# Patient Record
Sex: MALE | URBAN - METROPOLITAN AREA
[De-identification: names, ages, dates, MRNs, and addresses within clinical notes are randomized per-mention and may not be internally consistent; named-entity substitution may affect disease eponyms.]

---

## 2022-09-04 ENCOUNTER — INPATIENT (INPATIENT)
Facility: HOSPITAL | Age: 52
LOS: 1 days | Discharge: HOME | DRG: 432 | End: 2022-09-06
Payer: MEDICARE

## 2022-09-04 VITALS
TEMPERATURE: 98 F | HEIGHT: 63 IN | RESPIRATION RATE: 18 BRPM | HEART RATE: 86 BPM | SYSTOLIC BLOOD PRESSURE: 102 MMHG | OXYGEN SATURATION: 100 % | DIASTOLIC BLOOD PRESSURE: 61 MMHG | WEIGHT: 190.04 LBS

## 2022-09-04 DIAGNOSIS — F17.200 NICOTINE DEPENDENCE, UNSPECIFIED, UNCOMPLICATED: ICD-10-CM

## 2022-09-04 DIAGNOSIS — I95.9 HYPOTENSION, UNSPECIFIED: ICD-10-CM

## 2022-09-04 DIAGNOSIS — R16.1 SPLENOMEGALY, NOT ELSEWHERE CLASSIFIED: ICD-10-CM

## 2022-09-04 DIAGNOSIS — K71.3 TOXIC LIVER DISEASE WITH CHRONIC PERSISTENT HEPATITIS: ICD-10-CM

## 2022-09-04 DIAGNOSIS — Z59.9 PROBLEM RELATED TO HOUSING AND ECONOMIC CIRCUMSTANCES, UNSPECIFIED: ICD-10-CM

## 2022-09-04 DIAGNOSIS — M54.9 DORSALGIA, UNSPECIFIED: ICD-10-CM

## 2022-09-04 DIAGNOSIS — I10 ESSENTIAL (PRIMARY) HYPERTENSION: ICD-10-CM

## 2022-09-04 DIAGNOSIS — E87.5 HYPERKALEMIA: ICD-10-CM

## 2022-09-04 DIAGNOSIS — D64.9 ANEMIA, UNSPECIFIED: ICD-10-CM

## 2022-09-04 DIAGNOSIS — F10.10 ALCOHOL ABUSE, UNCOMPLICATED: ICD-10-CM

## 2022-09-04 DIAGNOSIS — N17.9 ACUTE KIDNEY FAILURE, UNSPECIFIED: ICD-10-CM

## 2022-09-04 DIAGNOSIS — E11.9 TYPE 2 DIABETES MELLITUS WITHOUT COMPLICATIONS: ICD-10-CM

## 2022-09-04 DIAGNOSIS — G89.29 OTHER CHRONIC PAIN: ICD-10-CM

## 2022-09-04 DIAGNOSIS — K27.9 PEPTIC ULCER, SITE UNSPECIFIED, UNSPECIFIED AS ACUTE OR CHRONIC, WITHOUT HEMORRHAGE OR PERFORATION: ICD-10-CM

## 2022-09-04 DIAGNOSIS — Z59.00 HOMELESSNESS UNSPECIFIED: ICD-10-CM

## 2022-09-04 DIAGNOSIS — M54.50 LOW BACK PAIN, UNSPECIFIED: ICD-10-CM

## 2022-09-04 DIAGNOSIS — K70.31 ALCOHOLIC CIRRHOSIS OF LIVER WITH ASCITES: ICD-10-CM

## 2022-09-04 DIAGNOSIS — Z98.84 BARIATRIC SURGERY STATUS: ICD-10-CM

## 2022-09-04 DIAGNOSIS — J45.909 UNSPECIFIED ASTHMA, UNCOMPLICATED: ICD-10-CM

## 2022-09-04 DIAGNOSIS — F17.210 NICOTINE DEPENDENCE, CIGARETTES, UNCOMPLICATED: ICD-10-CM

## 2022-09-04 DIAGNOSIS — Z29.9 ENCOUNTER FOR PROPHYLACTIC MEASURES, UNSPECIFIED: ICD-10-CM

## 2022-09-04 DIAGNOSIS — G47.33 OBSTRUCTIVE SLEEP APNEA (ADULT) (PEDIATRIC): ICD-10-CM

## 2022-09-04 DIAGNOSIS — F10.11 ALCOHOL ABUSE, IN REMISSION: ICD-10-CM

## 2022-09-04 DIAGNOSIS — Z98.84 BARIATRIC SURGERY STATUS: Chronic | ICD-10-CM

## 2022-09-04 DIAGNOSIS — K74.60 UNSPECIFIED CIRRHOSIS OF LIVER: ICD-10-CM

## 2022-09-04 DIAGNOSIS — F14.99 COCAINE USE, UNSPECIFIED WITH UNSPECIFIED COCAINE-INDUCED DISORDER: ICD-10-CM

## 2022-09-04 DIAGNOSIS — N17.0 ACUTE KIDNEY FAILURE WITH TUBULAR NECROSIS: ICD-10-CM

## 2022-09-04 DIAGNOSIS — F12.99 CANNABIS USE, UNSPECIFIED WITH UNSPECIFIED CANNABIS-INDUCED DISORDER: ICD-10-CM

## 2022-09-04 LAB
ALBUMIN SERPL ELPH-MCNC: 3.1 G/DL — LOW (ref 3.4–5)
ALBUMIN SERPL ELPH-MCNC: 3.5 G/DL — SIGNIFICANT CHANGE UP (ref 3.3–5)
ALP SERPL-CCNC: 87 U/L — SIGNIFICANT CHANGE UP (ref 40–120)
ALP SERPL-CCNC: 88 U/L — SIGNIFICANT CHANGE UP (ref 40–120)
ALT FLD-CCNC: 10 U/L — SIGNIFICANT CHANGE UP (ref 10–45)
ALT FLD-CCNC: 15 U/L — SIGNIFICANT CHANGE UP (ref 12–42)
AMPHET UR-MCNC: NEGATIVE — SIGNIFICANT CHANGE UP
ANION GAP SERPL CALC-SCNC: 10 MMOL/L — SIGNIFICANT CHANGE UP (ref 9–16)
ANION GAP SERPL CALC-SCNC: 7 MMOL/L — SIGNIFICANT CHANGE UP (ref 5–17)
ANION GAP SERPL CALC-SCNC: 8 MMOL/L — SIGNIFICANT CHANGE UP (ref 5–17)
ANION GAP SERPL CALC-SCNC: 9 MMOL/L — SIGNIFICANT CHANGE UP (ref 9–16)
APPEARANCE UR: CLEAR — SIGNIFICANT CHANGE UP
APTT BLD: 35.3 SEC — SIGNIFICANT CHANGE UP (ref 27.5–35.5)
APTT BLD: 39.7 SEC — HIGH (ref 27.5–35.5)
AST SERPL-CCNC: 19 U/L — SIGNIFICANT CHANGE UP (ref 10–40)
AST SERPL-CCNC: 19 U/L — SIGNIFICANT CHANGE UP (ref 15–37)
BACTERIA # UR AUTO: ABNORMAL /HPF
BARBITURATES UR SCN-MCNC: NEGATIVE — SIGNIFICANT CHANGE UP
BASOPHILS # BLD AUTO: 0.01 K/UL — SIGNIFICANT CHANGE UP (ref 0–0.2)
BASOPHILS # BLD AUTO: 0.03 K/UL — SIGNIFICANT CHANGE UP (ref 0–0.2)
BASOPHILS NFR BLD AUTO: 0.2 % — SIGNIFICANT CHANGE UP (ref 0–2)
BASOPHILS NFR BLD AUTO: 0.5 % — SIGNIFICANT CHANGE UP (ref 0–2)
BENZODIAZ UR-MCNC: NEGATIVE — SIGNIFICANT CHANGE UP
BILIRUB SERPL-MCNC: 0.5 MG/DL — SIGNIFICANT CHANGE UP (ref 0.2–1.2)
BILIRUB UR-MCNC: NEGATIVE — SIGNIFICANT CHANGE UP
BLD GP AB SCN SERPL QL: NEGATIVE — SIGNIFICANT CHANGE UP
BUN SERPL-MCNC: 115 MG/DL — HIGH (ref 7–23)
BUN SERPL-MCNC: 119 MG/DL — HIGH (ref 7–23)
BUN SERPL-MCNC: 72 MG/DL — HIGH (ref 7–23)
BUN SERPL-MCNC: 86 MG/DL — HIGH (ref 7–23)
CALCIUM SERPL-MCNC: 8.9 MG/DL — SIGNIFICANT CHANGE UP (ref 8.5–10.5)
CALCIUM SERPL-MCNC: 9.2 MG/DL — SIGNIFICANT CHANGE UP (ref 8.5–10.5)
CALCIUM SERPL-MCNC: 9.4 MG/DL — SIGNIFICANT CHANGE UP (ref 8.4–10.5)
CALCIUM SERPL-MCNC: 9.5 MG/DL — SIGNIFICANT CHANGE UP (ref 8.4–10.5)
CHLORIDE SERPL-SCNC: 103 MMOL/L — SIGNIFICANT CHANGE UP (ref 96–108)
CHLORIDE SERPL-SCNC: 105 MMOL/L — SIGNIFICANT CHANGE UP (ref 96–108)
CHLORIDE SERPL-SCNC: 106 MMOL/L — SIGNIFICANT CHANGE UP (ref 96–108)
CK SERPL-CCNC: 35 U/L — LOW (ref 39–308)
CO2 SERPL-SCNC: 22 MMOL/L — SIGNIFICANT CHANGE UP (ref 22–31)
CO2 SERPL-SCNC: 23 MMOL/L — SIGNIFICANT CHANGE UP (ref 22–31)
CO2 SERPL-SCNC: 25 MMOL/L — SIGNIFICANT CHANGE UP (ref 22–31)
COCAINE METAB.OTHER UR-MCNC: POSITIVE
COLOR SPEC: YELLOW — SIGNIFICANT CHANGE UP
COMMENT - URINE: SIGNIFICANT CHANGE UP
CREAT SERPL-MCNC: 1.93 MG/DL — HIGH (ref 0.5–1.3)
CREAT SERPL-MCNC: 2.33 MG/DL — HIGH (ref 0.5–1.3)
CREAT SERPL-MCNC: 3.41 MG/DL — HIGH (ref 0.5–1.3)
CREAT SERPL-MCNC: 3.54 MG/DL — HIGH (ref 0.5–1.3)
DIFF PNL FLD: NEGATIVE — SIGNIFICANT CHANGE UP
EGFR: 20 ML/MIN/1.73M2 — LOW
EGFR: 21 ML/MIN/1.73M2 — LOW
EGFR: 33 ML/MIN/1.73M2 — LOW
EGFR: 41 ML/MIN/1.73M2 — LOW
EOSINOPHIL # BLD AUTO: 0.18 K/UL — SIGNIFICANT CHANGE UP (ref 0–0.5)
EOSINOPHIL # BLD AUTO: 0.25 K/UL — SIGNIFICANT CHANGE UP (ref 0–0.5)
EOSINOPHIL NFR BLD AUTO: 3.3 % — SIGNIFICANT CHANGE UP (ref 0–6)
EOSINOPHIL NFR BLD AUTO: 4.3 % — SIGNIFICANT CHANGE UP (ref 0–6)
EPI CELLS # UR: ABNORMAL /HPF (ref 0–5)
ETHANOL SERPL-MCNC: <3 MG/DL — SIGNIFICANT CHANGE UP
GLUCOSE BLDC GLUCOMTR-MCNC: 117 MG/DL — HIGH (ref 70–99)
GLUCOSE BLDC GLUCOMTR-MCNC: 178 MG/DL — HIGH (ref 70–99)
GLUCOSE BLDC GLUCOMTR-MCNC: 98 MG/DL — SIGNIFICANT CHANGE UP (ref 70–99)
GLUCOSE SERPL-MCNC: 143 MG/DL — HIGH (ref 70–99)
GLUCOSE SERPL-MCNC: 149 MG/DL — HIGH (ref 70–99)
GLUCOSE SERPL-MCNC: 173 MG/DL — HIGH (ref 70–99)
GLUCOSE SERPL-MCNC: 63 MG/DL — LOW (ref 70–99)
GLUCOSE UR QL: NEGATIVE — SIGNIFICANT CHANGE UP
HCT VFR BLD CALC: 27.3 % — LOW (ref 39–50)
HCT VFR BLD CALC: 29.5 % — LOW (ref 39–50)
HGB BLD-MCNC: 8.7 G/DL — LOW (ref 13–17)
HGB BLD-MCNC: 9.3 G/DL — LOW (ref 13–17)
HYALINE CASTS # UR AUTO: ABNORMAL /LPF (ref 0–2)
IMM GRANULOCYTES NFR BLD AUTO: 0.2 % — SIGNIFICANT CHANGE UP (ref 0–1.5)
IMM GRANULOCYTES NFR BLD AUTO: 0.3 % — SIGNIFICANT CHANGE UP (ref 0–1.5)
INR BLD: 1.19 — HIGH (ref 0.88–1.16)
INR BLD: 1.27 — HIGH (ref 0.88–1.16)
KETONES UR-MCNC: NEGATIVE — SIGNIFICANT CHANGE UP
LACTATE SERPL-SCNC: 1.5 MMOL/L — SIGNIFICANT CHANGE UP (ref 0.4–2)
LEUKOCYTE ESTERASE UR-ACNC: ABNORMAL
LIDOCAIN IGE QN: 235 U/L — SIGNIFICANT CHANGE UP (ref 73–393)
LYMPHOCYTES # BLD AUTO: 1.31 K/UL — SIGNIFICANT CHANGE UP (ref 1–3.3)
LYMPHOCYTES # BLD AUTO: 1.7 K/UL — SIGNIFICANT CHANGE UP (ref 1–3.3)
LYMPHOCYTES # BLD AUTO: 24.2 % — SIGNIFICANT CHANGE UP (ref 13–44)
LYMPHOCYTES # BLD AUTO: 29.4 % — SIGNIFICANT CHANGE UP (ref 13–44)
MAGNESIUM SERPL-MCNC: 2.3 MG/DL — SIGNIFICANT CHANGE UP (ref 1.6–2.6)
MCHC RBC-ENTMCNC: 27.9 PG — SIGNIFICANT CHANGE UP (ref 27–34)
MCHC RBC-ENTMCNC: 28 PG — SIGNIFICANT CHANGE UP (ref 27–34)
MCHC RBC-ENTMCNC: 31.5 GM/DL — LOW (ref 32–36)
MCHC RBC-ENTMCNC: 31.9 GM/DL — LOW (ref 32–36)
MCV RBC AUTO: 87.8 FL — SIGNIFICANT CHANGE UP (ref 80–100)
MCV RBC AUTO: 88.6 FL — SIGNIFICANT CHANGE UP (ref 80–100)
METHADONE UR-MCNC: NEGATIVE — SIGNIFICANT CHANGE UP
MONOCYTES # BLD AUTO: 0.44 K/UL — SIGNIFICANT CHANGE UP (ref 0–0.9)
MONOCYTES # BLD AUTO: 0.55 K/UL — SIGNIFICANT CHANGE UP (ref 0–0.9)
MONOCYTES NFR BLD AUTO: 8.1 % — SIGNIFICANT CHANGE UP (ref 2–14)
MONOCYTES NFR BLD AUTO: 9.5 % — SIGNIFICANT CHANGE UP (ref 2–14)
NEUTROPHILS # BLD AUTO: 3.23 K/UL — SIGNIFICANT CHANGE UP (ref 1.8–7.4)
NEUTROPHILS # BLD AUTO: 3.46 K/UL — SIGNIFICANT CHANGE UP (ref 1.8–7.4)
NEUTROPHILS NFR BLD AUTO: 56 % — SIGNIFICANT CHANGE UP (ref 43–77)
NEUTROPHILS NFR BLD AUTO: 64 % — SIGNIFICANT CHANGE UP (ref 43–77)
NITRITE UR-MCNC: NEGATIVE — SIGNIFICANT CHANGE UP
NRBC # BLD: 0 /100 WBCS — SIGNIFICANT CHANGE UP (ref 0–0)
NT-PROBNP SERPL-SCNC: 122 PG/ML — SIGNIFICANT CHANGE UP
OPIATES UR-MCNC: NEGATIVE — SIGNIFICANT CHANGE UP
PCO2 BLDV: 43 MMHG — SIGNIFICANT CHANGE UP (ref 42–55)
PCP SPEC-MCNC: SIGNIFICANT CHANGE UP
PCP UR-MCNC: NEGATIVE — SIGNIFICANT CHANGE UP
PH BLDV: 7.35 — SIGNIFICANT CHANGE UP (ref 7.32–7.43)
PH UR: 6 — SIGNIFICANT CHANGE UP (ref 5–8)
PHOSPHATE SERPL-MCNC: 3.6 MG/DL — SIGNIFICANT CHANGE UP (ref 2.5–4.5)
PLATELET # BLD AUTO: 126 K/UL — LOW (ref 150–400)
PLATELET # BLD AUTO: 140 K/UL — LOW (ref 150–400)
PO2 BLDV: 43 MMHG — SIGNIFICANT CHANGE UP (ref 25–45)
POTASSIUM SERPL-MCNC: 5.1 MMOL/L — SIGNIFICANT CHANGE UP (ref 3.5–5.3)
POTASSIUM SERPL-MCNC: 6.1 MMOL/L — HIGH (ref 3.5–5.3)
POTASSIUM SERPL-MCNC: 6.2 MMOL/L — CRITICAL HIGH (ref 3.5–5.3)
POTASSIUM SERPL-MCNC: 6.3 MMOL/L — CRITICAL HIGH (ref 3.5–5.3)
POTASSIUM SERPL-SCNC: 5.1 MMOL/L — SIGNIFICANT CHANGE UP (ref 3.5–5.3)
POTASSIUM SERPL-SCNC: 6.1 MMOL/L — HIGH (ref 3.5–5.3)
POTASSIUM SERPL-SCNC: 6.2 MMOL/L — CRITICAL HIGH (ref 3.5–5.3)
POTASSIUM SERPL-SCNC: 6.3 MMOL/L — CRITICAL HIGH (ref 3.5–5.3)
PROT SERPL-MCNC: 7.3 G/DL — SIGNIFICANT CHANGE UP (ref 6–8.3)
PROT SERPL-MCNC: 8 G/DL — SIGNIFICANT CHANGE UP (ref 6.4–8.2)
PROT UR-MCNC: NEGATIVE MG/DL — SIGNIFICANT CHANGE UP
PROTHROM AB SERPL-ACNC: 13.9 SEC — HIGH (ref 10.5–13.4)
PROTHROM AB SERPL-ACNC: 15.1 SEC — HIGH (ref 10.5–13.4)
RBC # BLD: 3.11 M/UL — LOW (ref 4.2–5.8)
RBC # BLD: 3.33 M/UL — LOW (ref 4.2–5.8)
RBC # FLD: 15.5 % — HIGH (ref 10.3–14.5)
RBC # FLD: 15.7 % — HIGH (ref 10.3–14.5)
RBC CASTS # UR COMP ASSIST: < 5 /HPF — SIGNIFICANT CHANGE UP
RH IG SCN BLD-IMP: POSITIVE — SIGNIFICANT CHANGE UP
SAO2 % BLDV: 78 % — SIGNIFICANT CHANGE UP (ref 67–88)
SARS-COV-2 RNA SPEC QL NAA+PROBE: SIGNIFICANT CHANGE UP
SODIUM SERPL-SCNC: 134 MMOL/L — SIGNIFICANT CHANGE UP (ref 132–145)
SODIUM SERPL-SCNC: 137 MMOL/L — SIGNIFICANT CHANGE UP (ref 135–145)
SODIUM SERPL-SCNC: 138 MMOL/L — SIGNIFICANT CHANGE UP (ref 132–145)
SODIUM SERPL-SCNC: 139 MMOL/L — SIGNIFICANT CHANGE UP (ref 135–145)
SP GR SPEC: 1.01 — SIGNIFICANT CHANGE UP (ref 1–1.03)
THC UR QL: POSITIVE
TROPONIN I, HIGH SENSITIVITY RESULT: 13.4 NG/L — SIGNIFICANT CHANGE UP
UROBILINOGEN FLD QL: 0.2 E.U./DL — SIGNIFICANT CHANGE UP
WBC # BLD: 5.41 K/UL — SIGNIFICANT CHANGE UP (ref 3.8–10.5)
WBC # BLD: 5.78 K/UL — SIGNIFICANT CHANGE UP (ref 3.8–10.5)
WBC # FLD AUTO: 5.41 K/UL — SIGNIFICANT CHANGE UP (ref 3.8–10.5)
WBC # FLD AUTO: 5.78 K/UL — SIGNIFICANT CHANGE UP (ref 3.8–10.5)
WBC UR QL: ABNORMAL /HPF

## 2022-09-04 PROCEDURE — 99222 1ST HOSP IP/OBS MODERATE 55: CPT

## 2022-09-04 PROCEDURE — 93010 ELECTROCARDIOGRAM REPORT: CPT

## 2022-09-04 PROCEDURE — 71045 X-RAY EXAM CHEST 1 VIEW: CPT | Mod: 26

## 2022-09-04 PROCEDURE — 74176 CT ABD & PELVIS W/O CONTRAST: CPT | Mod: 26,MH

## 2022-09-04 PROCEDURE — 99285 EMERGENCY DEPT VISIT HI MDM: CPT | Mod: 25

## 2022-09-04 PROCEDURE — 99223 1ST HOSP IP/OBS HIGH 75: CPT

## 2022-09-04 RX ORDER — DEXTROSE 50 % IN WATER 50 %
25 SYRINGE (ML) INTRAVENOUS ONCE
Refills: 0 | Status: DISCONTINUED | OUTPATIENT
Start: 2022-09-04 | End: 2022-09-06

## 2022-09-04 RX ORDER — OXYCODONE HYDROCHLORIDE 5 MG/1
10 TABLET ORAL ONCE
Refills: 0 | Status: DISCONTINUED | OUTPATIENT
Start: 2022-09-04 | End: 2022-09-04

## 2022-09-04 RX ORDER — INSULIN LISPRO 100/ML
VIAL (ML) SUBCUTANEOUS
Refills: 0 | Status: DISCONTINUED | OUTPATIENT
Start: 2022-09-04 | End: 2022-09-06

## 2022-09-04 RX ORDER — SPIRONOLACTONE 25 MG/1
100 TABLET, FILM COATED ORAL DAILY
Refills: 0 | Status: DISCONTINUED | OUTPATIENT
Start: 2022-09-04 | End: 2022-09-04

## 2022-09-04 RX ORDER — IPRATROPIUM/ALBUTEROL SULFATE 18-103MCG
3 AEROSOL WITH ADAPTER (GRAM) INHALATION EVERY 6 HOURS
Refills: 0 | Status: DISCONTINUED | OUTPATIENT
Start: 2022-09-04 | End: 2022-09-06

## 2022-09-04 RX ORDER — SODIUM CHLORIDE 9 MG/ML
1000 INJECTION, SOLUTION INTRAVENOUS
Refills: 0 | Status: DISCONTINUED | OUTPATIENT
Start: 2022-09-04 | End: 2022-09-06

## 2022-09-04 RX ORDER — GABAPENTIN 400 MG/1
300 CAPSULE ORAL THREE TIMES A DAY
Refills: 0 | Status: DISCONTINUED | OUTPATIENT
Start: 2022-09-04 | End: 2022-09-06

## 2022-09-04 RX ORDER — NICOTINE POLACRILEX 2 MG
1 GUM BUCCAL DAILY
Refills: 0 | Status: DISCONTINUED | OUTPATIENT
Start: 2022-09-04 | End: 2022-09-06

## 2022-09-04 RX ORDER — ALBUMIN HUMAN 25 %
100 VIAL (ML) INTRAVENOUS ONCE
Refills: 0 | Status: COMPLETED | OUTPATIENT
Start: 2022-09-05 | End: 2022-09-05

## 2022-09-04 RX ORDER — ALBUMIN HUMAN 25 %
50 VIAL (ML) INTRAVENOUS ONCE
Refills: 0 | Status: COMPLETED | OUTPATIENT
Start: 2022-09-06 | End: 2022-09-06

## 2022-09-04 RX ORDER — GLUCAGON INJECTION, SOLUTION 0.5 MG/.1ML
1 INJECTION, SOLUTION SUBCUTANEOUS ONCE
Refills: 0 | Status: DISCONTINUED | OUTPATIENT
Start: 2022-09-04 | End: 2022-09-06

## 2022-09-04 RX ORDER — SODIUM BICARBONATE 1 MEQ/ML
50 SYRINGE (ML) INTRAVENOUS ONCE
Refills: 0 | Status: COMPLETED | OUTPATIENT
Start: 2022-09-04 | End: 2022-09-04

## 2022-09-04 RX ORDER — SUCRALFATE 1 G
1 TABLET ORAL
Refills: 0 | Status: DISCONTINUED | OUTPATIENT
Start: 2022-09-04 | End: 2022-09-06

## 2022-09-04 RX ORDER — INFLUENZA VIRUS VACCINE 15; 15; 15; 15 UG/.5ML; UG/.5ML; UG/.5ML; UG/.5ML
0.5 SUSPENSION INTRAMUSCULAR ONCE
Refills: 0 | Status: DISCONTINUED | OUTPATIENT
Start: 2022-09-04 | End: 2022-09-06

## 2022-09-04 RX ORDER — DEXTROSE 50 % IN WATER 50 %
15 SYRINGE (ML) INTRAVENOUS ONCE
Refills: 0 | Status: DISCONTINUED | OUTPATIENT
Start: 2022-09-04 | End: 2022-09-06

## 2022-09-04 RX ORDER — CEFTRIAXONE 500 MG/1
2000 INJECTION, POWDER, FOR SOLUTION INTRAMUSCULAR; INTRAVENOUS EVERY 24 HOURS
Refills: 0 | Status: DISCONTINUED | OUTPATIENT
Start: 2022-09-04 | End: 2022-09-04

## 2022-09-04 RX ORDER — SODIUM ZIRCONIUM CYCLOSILICATE 10 G/10G
10 POWDER, FOR SUSPENSION ORAL ONCE
Refills: 0 | Status: COMPLETED | OUTPATIENT
Start: 2022-09-04 | End: 2022-09-04

## 2022-09-04 RX ORDER — FUROSEMIDE 40 MG
40 TABLET ORAL EVERY 24 HOURS
Refills: 0 | Status: DISCONTINUED | OUTPATIENT
Start: 2022-09-04 | End: 2022-09-04

## 2022-09-04 RX ORDER — SODIUM CHLORIDE 9 MG/ML
500 INJECTION INTRAMUSCULAR; INTRAVENOUS; SUBCUTANEOUS ONCE
Refills: 0 | Status: COMPLETED | OUTPATIENT
Start: 2022-09-04 | End: 2022-09-04

## 2022-09-04 RX ORDER — DEXTROSE 50 % IN WATER 50 %
12.5 SYRINGE (ML) INTRAVENOUS ONCE
Refills: 0 | Status: DISCONTINUED | OUTPATIENT
Start: 2022-09-04 | End: 2022-09-06

## 2022-09-04 RX ORDER — SPIRONOLACTONE 25 MG/1
100 TABLET, FILM COATED ORAL EVERY 24 HOURS
Refills: 0 | Status: DISCONTINUED | OUTPATIENT
Start: 2022-09-04 | End: 2022-09-04

## 2022-09-04 RX ORDER — ALBUMIN HUMAN 25 %
50 VIAL (ML) INTRAVENOUS ONCE
Refills: 0 | Status: COMPLETED | OUTPATIENT
Start: 2022-09-04 | End: 2022-09-04

## 2022-09-04 RX ORDER — DEXTROSE 50 % IN WATER 50 %
50 SYRINGE (ML) INTRAVENOUS ONCE
Refills: 0 | Status: COMPLETED | OUTPATIENT
Start: 2022-09-04 | End: 2022-09-04

## 2022-09-04 RX ORDER — SODIUM CHLORIDE 9 MG/ML
1000 INJECTION INTRAMUSCULAR; INTRAVENOUS; SUBCUTANEOUS ONCE
Refills: 0 | Status: COMPLETED | OUTPATIENT
Start: 2022-09-04 | End: 2022-09-04

## 2022-09-04 RX ORDER — SODIUM POLYSTYRENE SULFONATE 4.1 MEQ/G
30 POWDER, FOR SUSPENSION ORAL ONCE
Refills: 0 | Status: DISCONTINUED | OUTPATIENT
Start: 2022-09-04 | End: 2022-09-04

## 2022-09-04 RX ORDER — PANTOPRAZOLE SODIUM 20 MG/1
40 TABLET, DELAYED RELEASE ORAL
Refills: 0 | Status: DISCONTINUED | OUTPATIENT
Start: 2022-09-04 | End: 2022-09-06

## 2022-09-04 RX ORDER — ALBUMIN HUMAN 25 %
100 VIAL (ML) INTRAVENOUS EVERY 8 HOURS
Refills: 0 | Status: COMPLETED | OUTPATIENT
Start: 2022-09-04 | End: 2022-09-05

## 2022-09-04 RX ORDER — ACETAMINOPHEN 500 MG
650 TABLET ORAL EVERY 6 HOURS
Refills: 0 | Status: DISCONTINUED | OUTPATIENT
Start: 2022-09-04 | End: 2022-09-06

## 2022-09-04 RX ORDER — INSULIN HUMAN 100 [IU]/ML
10 INJECTION, SOLUTION SUBCUTANEOUS ONCE
Refills: 0 | Status: COMPLETED | OUTPATIENT
Start: 2022-09-04 | End: 2022-09-04

## 2022-09-04 RX ORDER — ALBUMIN HUMAN 25 %
100 VIAL (ML) INTRAVENOUS ONCE
Refills: 0 | Status: COMPLETED | OUTPATIENT
Start: 2022-09-04 | End: 2022-09-04

## 2022-09-04 RX ADMIN — Medication 50 MILLILITER(S): at 02:43

## 2022-09-04 RX ADMIN — GABAPENTIN 300 MILLIGRAM(S): 400 CAPSULE ORAL at 13:36

## 2022-09-04 RX ADMIN — SODIUM CHLORIDE 1000 MILLILITER(S): 9 INJECTION INTRAMUSCULAR; INTRAVENOUS; SUBCUTANEOUS at 09:09

## 2022-09-04 RX ADMIN — SODIUM CHLORIDE 1000 MILLILITER(S): 9 INJECTION INTRAMUSCULAR; INTRAVENOUS; SUBCUTANEOUS at 10:53

## 2022-09-04 RX ADMIN — SODIUM CHLORIDE 500 MILLILITER(S): 9 INJECTION INTRAMUSCULAR; INTRAVENOUS; SUBCUTANEOUS at 05:59

## 2022-09-04 RX ADMIN — Medication 50 MILLIEQUIVALENT(S): at 02:43

## 2022-09-04 RX ADMIN — OXYCODONE HYDROCHLORIDE 10 MILLIGRAM(S): 5 TABLET ORAL at 22:45

## 2022-09-04 RX ADMIN — Medication 2: at 22:46

## 2022-09-04 RX ADMIN — Medication 100 MILLILITER(S): at 21:54

## 2022-09-04 RX ADMIN — Medication 50 MILLILITER(S): at 21:58

## 2022-09-04 RX ADMIN — SODIUM ZIRCONIUM CYCLOSILICATE 10 GRAM(S): 10 POWDER, FOR SUSPENSION ORAL at 02:42

## 2022-09-04 RX ADMIN — OXYCODONE HYDROCHLORIDE 10 MILLIGRAM(S): 5 TABLET ORAL at 23:45

## 2022-09-04 RX ADMIN — Medication 1 PATCH: at 16:05

## 2022-09-04 RX ADMIN — Medication 650 MILLIGRAM(S): at 14:40

## 2022-09-04 RX ADMIN — Medication 1 PATCH: at 08:13

## 2022-09-04 RX ADMIN — GABAPENTIN 300 MILLIGRAM(S): 400 CAPSULE ORAL at 21:53

## 2022-09-04 RX ADMIN — Medication 1 GRAM(S): at 23:30

## 2022-09-04 RX ADMIN — Medication 50 MILLILITER(S): at 12:16

## 2022-09-04 RX ADMIN — Medication 650 MILLIGRAM(S): at 13:36

## 2022-09-04 RX ADMIN — Medication 1 GRAM(S): at 18:45

## 2022-09-04 RX ADMIN — INSULIN HUMAN 10 UNIT(S): 100 INJECTION, SOLUTION SUBCUTANEOUS at 02:42

## 2022-09-04 SDOH — ECONOMIC STABILITY - HOUSING INSECURITY: HOMELESSNESS UNSPECIFIED: Z59.00

## 2022-09-04 SDOH — ECONOMIC STABILITY - INCOME SECURITY: PROBLEM RELATED TO HOUSING AND ECONOMIC CIRCUMSTANCES, UNSPECIFIED: Z59.9

## 2022-09-04 NOTE — CONSULT NOTE ADULT - ATTENDING COMMENTS
Rios, cocaine + utox, hypotension and liver cirrhosis. Agree with albumin challenge, Cr already improving with hydration, non oliguric  Check u/a, urine Na, Cr  Hyperkalemia - hold spironolactone

## 2022-09-04 NOTE — H&P ADULT - HISTORY OF PRESENT ILLNESS
TEDDY BROTHERS [id number] is a 51y yo Male with PMH of ex-alcoholism (sober for 7 months), smoking (35-40 pack year history), DM, TENZIN (formerly on CPAP), asthma, PUD, abdominal hernia (per patient), Hx of gastric bypass, and Hx of multiple paracentesis to abdomen who presents to the ED with generalized weakness, fatigue, decreased appetite, and generalized abdominal discomfort. Pt usually follows at [facility] where he gets his medication refills and a paracentesis every few months; his last paracentesis was a few months ago. He reports having insurance issues which have prevented him from following up at [facility] or refilling his medications, and he has run out of his meds over the past 2 months.       Denies fever, chills, chest pain, palpitations, SOB, cough, abdominal pain, nausea, vomiting, diarrhea, constipation, urinary frequency, urgency, or dysuria, headaches, changes in vision, dizziness, numbness, tingling.  Denies recent travel, recent antibiotic use, or sick contacts.      ED vitals: T , HR , BP , RR , O2 % on RA  ED labs:   ED studies:  ED Interventions:      TEDDY BROTHERS [id number] is a 51y yo Male with PMH of ex-alcoholism (sober for 7 months), smoking (35-40 pack year history), DM, TENZIN (formerly on CPAP), asthma, PUD, abdominal hernia (per patient), Hx of gastric bypass, and Hx of multiple paracentesis to abdomen who presents to the ED with generalized weakness, fatigue, decreased appetite, and generalized abdominal discomfort. Pt usually follows at [facility] where he gets his medication refills and a paracentesis every few months; his last paracentesis was a few months ago. He reports having insurance issues which have prevented him from following up at [facility] or refilling his medications, and he has run out of his meds over the past 2 months.       Denies fever, chills, chest pain, palpitations, SOB, cough, abdominal pain, nausea, vomiting, diarrhea, constipation, urinary frequency, urgency, or dysuria, headaches, changes in vision, dizziness, numbness, tingling.  Denies recent travel, recent antibiotic use, or sick contacts.      ED vitals: T , HR , BP , RR , O2 % on RA  ED labs:   ED studies:  ED Interventions:      TEDDY BROTHERS [id number] is a 51y yo Male with PMH of ex-alcoholism (sober for 7 months), smoking (35-40 pack year history), DM, TENZIN (formerly on CPAP), asthma, PUD, abdominal hernia (per patient), Hx of gastric bypass, and Hx of multiple paracentesis to abdomen who presents to the ED with generalized weakness, fatigue, decreased appetite, and generalized abdominal discomfort. Pt usually follows at [facility] where he gets his medication refills and a paracentesis every few months; his last paracentesis was a few months ago. He reports having insurance issues which have prevented him from following up at [facility] or refilling his medications, and he has run out of his meds over the past 2 months.       Denies fever, chills, chest pain, palpitations, SOB, cough, abdominal pain, nausea, vomiting, diarrhea, constipation, urinary frequency, urgency, or dysuria, headaches, changes in vision, dizziness, numbness, tingling.  Denies recent travel, recent antibiotic use, or sick contacts.      ED vitals: T , HR , BP , RR , O2 % on RA  ED labs:   ED studies:  ED Interventions:      TEDDY BROTHERS [id number] is a 51y yo Male with PMH of ex-alcoholism (sober for 7 months), smoking (35-40 pack year history), DM, TENZIN (formerly on CPAP), asthma, PUD, abdominal hernia (per patient), Hx of gastric bypass, and Hx of multiple paracentesis to abdomen who presents to the ED with generalized weakness, fatigue, decreased appetite, and generalized abdominal discomfort. Pt usually follows at [facility] where he gets his medication refills and a paracentesis every few months. Recently he has been having insurance issues which have prevented him from following up at [facility] or refilling his medications; he has run out of his meds over the past 2 months and has not had a paracentesis in several months. He denies fever, chills, headache, dizziness, lightheadedness, vision changes, chest pain, palpitations, SOB, cough, n/v, diarrhea, constipation, dysuria, hematuria, urinary frequency/urgency, lower extremity edema, numbness, or tingling.    During transportation from [facility] to [facility], pt became hypotensive with BP 80/46. He received 1L NS bolus in the ambulance which did not improve his BP. On arrival to [facility], pt was mentating well, speaking in full sentences, and did not report any additional symptoms since his ED course.    ED vitals: T 97.9, HR 86, BP 80/46, RR 18, O2 99% on RA  ED labs: WBC 5.78, Hgb 9.3, Plt 140, Cr 3.54, UA trace leukocyte esterase, many WBCs, 5-10 epithelial cells, many bacteria, few hyaline casts. UTox +marijuana, cocaine  ED studies:  - CXR: No acute infiltrates   - EKG: Normal sinus rhythm  - CT abdomen: Hepatic cirrhosis w/ small ascites, splenomegaly, and anasarca suggesting portal hypertension/volume overload   ED Interventions: Lokelma 10 g x1 dose      TEDDY BROTHERS [id number] is a 51y yo Male with PMH of ex-alcoholism (sober for 7 months), smoking (35-40 pack year history), DM, TENZIN (formerly on CPAP), asthma, PUD, abdominal hernia (per patient), Hx of gastric bypass, and Hx of multiple paracentesis to abdomen who presents to the ED with generalized weakness, fatigue, decreased appetite, and generalized abdominal discomfort. Pt usually follows at [facility] where he gets his medication refills and a paracentesis every few months. Recently he has been having insurance issues which have prevented him from following up at [facility] or refilling his medications; he has run out of his meds over the past 2 months and has not had a paracentesis in several months. He denies fever, chills, headache, dizziness, lightheadedness, vision changes, chest pain, palpitations, SOB, cough, n/v, diarrhea, constipation, dysuria, hematuria, urinary frequency/urgency, lower extremity edema, numbness, or tingling.    During transportation from [facility] to [facility], pt became hypotensive with BP 80/46. He received 1L NS bolus in the ambulance which did not improve his BP. On arrival to [facility], pt was mentating well, speaking in full sentences, and did not report any additional symptoms since his ED course.    ED vitals: T 97.9, HR 86, BP 80/46, RR 18, O2 99% on RA  ED labs: WBC 5.78, Hgb 9.3, Plt 140, Cr 3.54, UA trace leukocyte esterase, many WBCs, 5-10 epithelial cells, many bacteria, few hyaline casts. UTox +marijuana, cocaine  ED studies:  - CXR: No acute infiltrates   - EKG: Normal sinus rhythm  - CT abdomen: Hepatic cirrhosis w/ small ascites, splenomegaly, and anasarca suggesting portal hypertension/volume overload   ED Interventions: Lokelma 10 g x1 dose      TEDDY BROTHERS [id number] is a 51y yo Male with PMH of ex-alcoholism (sober for 7 months), smoking (35-40 pack year history), DM, TENZIN (formerly on CPAP), asthma, PUD, abdominal hernia (per patient), Hx of gastric bypass, and Hx of multiple paracentesis to abdomen who presents to the ED with generalized weakness, fatigue, decreased appetite, and generalized abdominal discomfort. Pt usually follows at [facility] where he gets his medication refills and a paracentesis every few months. Recently he has been having insurance issues which have prevented him from following up at [facility] or refilling his medications; he has run out of his meds over the past 2 months and has not had a paracentesis in several months. He denies fever, chills, headache, dizziness, lightheadedness, vision changes, chest pain, palpitations, SOB, cough, n/v, diarrhea, constipation, dysuria, hematuria, urinary frequency/urgency, lower extremity edema, numbness, or tingling.    During transportation from [facility] to [facility], pt became hypotensive with BP 80/46. He received 1L NS bolus in the ambulance which did not improve his BP. On arrival to [facility], pt was mentating well, speaking in full sentences, and did not report any additional symptoms since his ED course.    ED vitals: T 97.9, HR 86, BP 80/46, RR 18, O2 99% on RA  ED labs: WBC 5.78, Hgb 9.3, Plt 140, Cr 3.54, UA trace leukocyte esterase, many WBCs, 5-10 epithelial cells, many bacteria, few hyaline casts. UTox +marijuana, cocaine  ED studies:  - CXR: No acute infiltrates   - EKG: Normal sinus rhythm  - CT abdomen: Hepatic cirrhosis w/ small ascites, splenomegaly, and anasarca suggesting portal hypertension/volume overload   ED Interventions: Lokelma 10 g x1 dose      TEDDY BROTHERS [id number] is a 51y yo Male with PMH of ex-alcoholism (sober for 7 months), cirrhosis, smoking (35-40 pack year history), DM, TENZIN (formerly on CPAP), asthma, PUD, abdominal hernia (per patient), Hx of gastric bypass, and Hx of multiple paracentesis to abdomen who presents to the ED with generalized weakness, fatigue, decreased appetite, and generalized abdominal discomfort. Pt usually follows at [facility] where he gets his medication refills and a paracentesis every few months. Recently he has been having insurance issues which have prevented him from following up at [facility] or refilling his medications; he has run out of his meds over the past 2 months and has not had a paracentesis in several months. He denies fever, chills, headache, dizziness, lightheadedness, vision changes, chest pain, palpitations, SOB, cough, n/v, diarrhea, constipation, dysuria, hematuria, urinary frequency/urgency, lower extremity edema, numbness, or tingling.    During transportation from [facility] to [facility], pt became hypotensive with BP 80/46. He received 1L NS bolus in the ambulance which did not improve his BP. On arrival to [facility], pt was mentating well, speaking in full sentences, and did not report any additional symptoms.    ED vitals: T 97.9, HR 86, BP 80/46, RR 18, O2 99% on RA  ED labs: WBC 5.78, Hgb 9.3, Plt 140, Cr 3.54, UA trace leukocyte esterase, many WBCs, 5-10 epithelial cells, many bacteria, few hyaline casts. UTox +marijuana, cocaine  ED studies:  - CXR: No acute infiltrates   - EKG: Normal sinus rhythm  - CT abdomen: Hepatic cirrhosis w/ small ascites, splenomegaly, and anasarca suggesting portal hypertension/volume overload   ED Interventions: Lokelma 10 g x1 dose      TEDDY BROTHERS [id number] is a 51y yo Male with PMH of ex-alcoholism (sober for 7 months), cirrhosis, smoking (35-40 pack year history), DM, TENZIN (formerly on CPAP), asthma, PUD, abdominal hernia (per patient), Hx of gastric bypass, and Hx of multiple paracentesis to abdomen who presents to the ED with generalized weakness, fatigue, decreased appetite, and generalized abdominal discomfort. Pt usually follows at [facility] where he gets his medication refills and a paracentesis every few months. Recently he has been having insurance issues which have prevented him from following up at [facility] or refilling his medications; he has run out of his meds over the past 2 months and has not had a paracentesis in several months. He denies fever, chills, headache, dizziness, lightheadedness, vision changes, chest pain, palpitations, SOB, cough, n/v, diarrhea, constipation, dysuria, hematuria, urinary frequency/urgency, lower extremity edema, numbness, or tingling.    During transportation from [place] to [facility], pt became hypotensive with BP 80/46. He received 1L NS bolus in the ambulance which did not improve his BP. On arrival to [facility], pt was mentating well, speaking in full sentences, and did not report any additional symptoms.    ED vitals: T 97.9, HR 86, BP 80/46, RR 18, O2 99% on RA  ED labs: WBC 5.78, Hgb 9.3, Plt 140, Cr 3.54, UA trace leukocyte esterase, many WBCs, 5-10 epithelial cells, many bacteria, few hyaline casts. UTox +marijuana, cocaine  ED studies:  - CXR: No acute infiltrates   - EKG: Normal sinus rhythm  - CT abdomen: Hepatic cirrhosis w/ small ascites, splenomegaly, and anasarca suggesting portal hypertension/volume overload   ED Interventions: Lokelma 10 g x1 dose      TEDDY BROTHERS [id number] is a 51y yo Male with PMH of ex-alcoholism (sober for 7 months), cirrhosis, smoking (35-40 pack year history), DM, TENZIN (formerly on CPAP), asthma, PUD, abdominal hernia (per patient), Hx of gastric bypass, and Hx of multiple paracentesis to abdomen who presents to the ED with generalized weakness, fatigue, decreased appetite, and generalized abdominal discomfort. Pt usually follows at [facility] where he gets his medication refills and a paracentesis every few months. Recently he has been having insurance issues which have prevented him from following up at [facility] or refilling his medications; he has run out of his meds over the past 2 months and has not had a paracentesis in several months. He denies fever, chills, headache, dizziness, lightheadedness, vision changes, chest pain, palpitations, SOB, cough, n/v, diarrhea, constipation, dysuria, hematuria, urinary frequency/urgency, lower extremity edema, numbness, or tingling.    During transportation from [facility] to [facility], pt became hypotensive with BP 80/46. He received 1L NS bolus in the ambulance which did not improve his BP. On arrival to [facility], pt was mentating well, speaking in full sentences, and did not report any additional symptoms.    ED vitals: T 97.9, HR 86, BP 80/46, RR 18, O2 99% on RA  ED labs: WBC 5.78, Hgb 9.3, Plt 140, Cr 3.54, UA trace leukocyte esterase, many WBCs, 5-10 epithelial cells, many bacteria, few hyaline casts. UTox +marijuana, cocaine  ED studies:  - CXR: No acute infiltrates   - EKG: Normal sinus rhythm  - CT abdomen: Hepatic cirrhosis w/ small ascites, splenomegaly, and anasarca suggesting portal hypertension/volume overload   ED Interventions: Lokelma 10 g x1 dose      TEDDY BROTHERS [id number] is a 51y yo Male with PMH of ex-alcoholism (sober for 7 months), cirrhosis, smoking (35-40 pack year history), DM, TENZIN (no longer on CPAP due to insurance issues), asthma, PUD, abdominal hernia (per patient), Hx of gastric bypass, and Hx of multiple paracentesis to abdomen who presents to the ED with generalized weakness, fatigue, decreased appetite, and generalized abdominal discomfort. Pt usually follows at [facility] where he gets his medication refills and a paracentesis every few months. Recently he has been having insurance issues which have prevented him from following up at [facility] or refilling his medications; he has run out of his meds over the past 2 months and has not had a paracentesis in several months. He denies fever, chills, headache, dizziness, lightheadedness, vision changes, chest pain, palpitations, SOB, cough, n/v, diarrhea, constipation, melena, hematochezia, dysuria, hematuria, urinary frequency/urgency, lower extremity edema, numbness, or tingling.    During transportation from [facility] to [facility], pt became hypotensive with BP 80/46. He received 1L NS bolus in the ambulance which did not improve his BP. On arrival to [facility], pt was mentating well, speaking in full sentences, and did not report any additional symptoms.    ED vitals: T 97.9, HR 86, BP 80/46, RR 18, O2 99% on RA  ED labs: WBC 5.78, Hgb 9.3, Plt 140, Cr 3.54, UA trace leukocyte esterase, many WBCs, 5-10 epithelial cells, many bacteria, few hyaline casts. UTox +marijuana, cocaine  ED studies:  - CXR: No acute infiltrates   - EKG: Normal sinus rhythm  - CT abdomen: Hepatic cirrhosis w/ small ascites, splenomegaly, and anasarca suggesting portal hypertension/volume overload   ED Interventions: Lokelma 10 g x1 dose, 500cc NS bolus      TEDDY BROTHERS [id number] is a 51y yo Male with PMH of ex-alcoholism (sober for 7 months), cirrhosis, smoking (35-40 pack year history), DM, TENZIN (no longer on CPAP due to insurance issues), asthma, PUD, abdominal hernia (per patient), Hx of gastric bypass, and Hx of multiple paracentesis to abdomen who presents to the ED with generalized weakness, fatigue, decreased appetite, and generalized abdominal discomfort. Pt usually follows at [facility] where he gets his medication refills and a paracentesis every few months. Recently he has been having insurance issues which have prevented him from following up at [facility] or refilling his medications; he has run out of his meds over the past 2 months and has not had a paracentesis in several months. He denies fever, chills, headache, dizziness, lightheadedness, vision changes, chest pain, palpitations, SOB, cough, n/v, diarrhea, constipation, melena, hematochezia, dysuria, hematuria, urinary frequency/urgency, lower extremity edema, numbness, or tingling.    During transportation from [facility] to [facility], pt became hypotensive with BP 80/46. He received 1L NS bolus in the ambulance which did not improve his BP. On arrival to [facility], pt was mentating well, speaking in full sentences, and did not report any additional symptoms.    ED vitals: T 97.9, HR 86, BP 80/46, RR 18, O2 99% on RA  ED labs: WBC 5.78, Hgb 9.3, Plt 140, Cr 3.54, UA trace leukocyte esterase, many WBCs, 5-10 epithelial cells, many bacteria, few hyaline casts. UTox +marijuana, cocaine  ED studies:  - CXR: No acute infiltrates   - EKG: Normal sinus rhythm  - CT abdomen: Hepatic cirrhosis w/ small ascites, splenomegaly, and anasarca suggesting portal hypertension/volume overload   ED Interventions: Lokelma 10 g x1 dose, 500cc NS bolus      TEDDY BROTHERS [id number] is a 51y yo Male with PMH of ex-alcoholism (sober for 7 months), cirrhosis, smoking (35-40 pack year history), DM, TENZIN (no longer on CPAP due to insurance issues), asthma, PUD, abdominal hernia (per patient), Hx of gastric bypass, and Hx of multiple paracentesis to abdomen who presents to the ED with generalized weakness, fatigue, decreased appetite, and generalized abdominal discomfort. Pt usually follows at [facility] where he gets his medication refills and a paracentesis every few months. Recently he has been having insurance issues which have prevented him from following up at [facility] or refilling his medications; he has run out of his meds over the past 2 months and has not had a paracentesis in several months. He denies fever, chills, headache, dizziness, lightheadedness, vision changes, chest pain, palpitations, SOB, cough, n/v, diarrhea, constipation, melena, hematochezia, dysuria, hematuria, urinary frequency/urgency, lower extremity edema, numbness, or tingling.    During transportation from [facility] to [facility], pt became hypotensive with BP 80/46. He received 1L NS bolus in the ambulance which did not improve his BP. On arrival to [facility], pt was mentating well, speaking in full sentences, and did not report any additional symptoms.    ED vitals: T 97.9, HR 86, BP 80/46, RR 18, O2 99% on RA  ED labs: WBC 5.78, Hgb 9.3, Plt 140, Cr 3.54, UA trace leukocyte esterase, many WBCs, 5-10 epithelial cells, many bacteria, few hyaline casts. UTox +marijuana, cocaine  ED studies:  - CXR: No acute infiltrates   - EKG: Normal sinus rhythm  - CT abdomen: Hepatic cirrhosis w/ small ascites, splenomegaly, and anasarca suggesting portal hypertension/volume overload   ED Interventions: Lokelma 10 g x1 dose, 500cc NS bolus      TEDDY BROTHERS [id number] is a 51y yo Male with PMH of ex-alcoholism (sober for 7 months), cirrhosis, smoking (35-40 pack year history), DM, TENZIN (no longer on CPAP due to insurance issues), asthma, PUD, abdominal hernia (per patient), Hx of gastric bypass, and Hx of multiple paracentesis to abdomen who presents to the ED with generalized weakness, fatigue, decreased appetite, and generalized abdominal discomfort. Pt usually follows at [facility] where he gets his medication refills and a paracentesis every few months. Recently he has been having insurance issues which have prevented him from following up at [facility] or refilling his medications; he has run out of his meds over the past 2 months and has not had a paracentesis in several months. He denies fever, chills, headache, dizziness, lightheadedness, vision changes, chest pain, palpitations, SOB, cough, n/v, diarrhea, constipation, melena, hematochezia, dysuria, hematuria, urinary frequency/urgency, lower extremity edema, numbness, or tingling.    During transportation from [facility] to [facility], pt became hypotensive with BP 80/46. He received 1L NS bolus in the ambulance which did not improve his BP. On arrival to [facility], pt was mentating well, speaking in full sentences, and did not report any additional symptoms.    ED vitals: T 97.9, HR 86, BP 80/46, RR 18, O2 99% on RA  ED labs: WBC 5.78, Hgb 9.3, Plt 140, Cr 3.54, , UA trace leukocyte esterase, many WBCs, 5-10 epithelial cells, many bacteria, few hyaline casts. UTox +marijuana, cocaine  ED studies:  - CXR: No acute infiltrates   - EKG: Normal sinus rhythm  - CT abdomen: Hepatic cirrhosis w/ small ascites, splenomegaly, and anasarca suggesting portal hypertension/volume overload   ED Interventions: Lokelma 10 g x1 dose, 500cc NS bolus      TEDDY BROTHERS [id number] is a 51y yo Male with PMH of ex-alcoholism (sober for 7 months), cirrhosis, smoking (35-40 pack year history), DM, TENZIN (no longer on CPAP due to insurance issues), asthma, PUD, abdominal hernia (per patient), Hx of gastric bypass, and Hx of multiple paracentesis to abdomen who presents to the ED with generalized weakness, fatigue, decreased appetite, and generalized abdominal discomfort. Pt usually follows at [facility] where he gets his medication refills and a paracentesis every few months. Recently he has been having insurance issues which have prevented him from following up at [facility] or refilling his medications; he has run out of his meds over the past 2 months and has not had a paracentesis in several months. He denies fever, chills, headache, dizziness, lightheadedness, vision changes, chest pain, palpitations, SOB, cough, n/v, diarrhea, constipation, melena, hematochezia, dysuria, hematuria, urinary frequency/urgency, lower extremity edema, numbness, or tingling.    During transportation from [facility] to [facility], pt became hypotensive with BP 80/46. He received 1L NS bolus in the ambulance which did not improve his BP. On arrival to [facility], pt was mentating well, speaking in full sentences, and did not report any additional symptoms.    ED vitals: T 97.9, HR 86, BP 80/46, RR 18, O2 99% on RA  ED labs: WBC 5.78, Hgb 9.3, Plt 140, Cr 3.54, , UA trace leukocyte esterase, many WBCs, 5-10 epithelial cells, many bacteria, few hyaline casts. UTox +marijuana, cocaine  ED studies:  - CXR: No acute infiltrates   - EKG: Normal sinus rhythm  - CT abdomen: Hepatic cirrhosis w/ small ascites, splenomegaly, and anasarca suggesting portal hypertension/volume overload   ED Interventions: Lokelma 10 g x1 dose, 500cc NS bolus      TEDDY BROTHERS [id number] is a 51y yo Male with PMH of ex-alcoholism (sober for 7 months), cirrhosis, smoking (35-40 pack year history), DM, TENZIN (no longer on CPAP due to insurance issues), asthma, PUD, abdominal hernia (per patient), Hx of gastric bypass, and Hx of multiple paracentesis to abdomen who presents to the ED with generalized weakness, fatigue, decreased appetite, and generalized abdominal discomfort. Pt usually follows at [facility] where he gets his medication refills and a paracentesis every few months. Recently he has been having insurance issues which have prevented him from following up at [facility] or refilling his medications; he has run out of his meds over the past 2 months and has not had a paracentesis in several months. He denies fever, chills, headache, dizziness, lightheadedness, vision changes, chest pain, palpitations, SOB, cough, n/v, diarrhea, constipation, melena, hematochezia, dysuria, hematuria, urinary frequency/urgency, lower extremity edema, numbness, or tingling.    During transportation from [facility] to [facility], pt became hypotensive with BP 80/46. He received 1L NS bolus in the ambulance which did not improve his BP. On arrival to [facility], pt was mentating well, speaking in full sentences, and did not report any additional symptoms.    ED vitals: T 97.9, HR 86, BP 80/46, RR 18, O2 99% on RA  ED labs: WBC 5.78, Hgb 9.3, Plt 140, Cr 3.54, , UA trace leukocyte esterase, many WBCs, 5-10 epithelial cells, many bacteria, few hyaline casts. UTox +marijuana, cocaine  ED studies:  - CXR: No acute infiltrates   - EKG: Normal sinus rhythm  - CT abdomen: Hepatic cirrhosis w/ small ascites, splenomegaly, and anasarca suggesting portal hypertension/volume overload   ED Interventions: Lokelma 10 g x1 dose, 500cc NS bolus      TEDDY BROTHERS [id number] is a 51y yo Male with PMH of ex-alcoholism (sober for 7 months), cirrhosis, smoking (35-40 pack year history), DM, TENZIN (no longer on CPAP due to insurance issues), asthma, PUD, abdominal hernia (per patient), chronic back pain, Hx of gastric bypass, and Hx of multiple paracentesis to abdomen who presents to the ED with generalized weakness, fatigue, decreased appetite, and generalized abdominal discomfort. Pt usually follows at [facility] where he gets his medication refills and a paracentesis every few months. Recently he has been having insurance issues which have prevented him from following up at [facility] or refilling his medications; he has run out of his meds over the past 2 months and has not had a paracentesis in several months. He denies fever, chills, headache, dizziness, lightheadedness, vision changes, chest pain, palpitations, SOB, cough, n/v, diarrhea, constipation, melena, hematochezia, dysuria, hematuria, urinary frequency/urgency, lower extremity edema, numbness, or tingling.    During transportation from [facility] to [facility], pt became hypotensive with BP 80/46. He received 1L NS bolus in the ambulance which did not improve his BP. On arrival to [facility], pt was mentating well, speaking in full sentences, and did not report any additional symptoms.    ED vitals: T 97.9, HR 86, BP 80/46, RR 18, O2 99% on RA  ED labs: WBC 5.78, Hgb 9.3, Plt 140, Cr 3.54, , UA trace leukocyte esterase, many WBCs, 5-10 epithelial cells, many bacteria, few hyaline casts. UTox +marijuana, cocaine  ED studies:  - CXR: No acute infiltrates   - EKG: Normal sinus rhythm  - CT abdomen: Hepatic cirrhosis w/ small ascites, splenomegaly, and anasarca suggesting portal hypertension/volume overload   ED Interventions: Lokelma 10 g x1 dose, 500cc NS bolus      TEDDY BROTHERS [id number] is a 51y yo Male with PMH of ex-alcoholism (sober for 7 months), cirrhosis, smoking (35-40 pack year history), DM, TENZIN (no longer on CPAP due to insurance issues), asthma, PUD, abdominal hernia (per patient), chronic back pain, Hx of gastric bypass, and Hx of multiple paracentesis to abdomen who presents to the ED with generalized weakness, fatigue, decreased appetite, and generalized abdominal discomfort. Pt usually follows at [facility] where he gets his medication refills and a paracentesis every few months. Recently he has been having insurance issues which have prevented him from following up at [facility] or refilling his medications; he has run out of his meds over the past 2 months and has not had a paracentesis in several months. He denies fever, chills, headache, dizziness, lightheadedness, vision changes, chest pain, palpitations, SOB, cough, n/v, diarrhea, constipation, melena, hematochezia, dysuria, hematuria, urinary frequency/urgency, lower extremity edema, numbness, or tingling.    During transportation from [facility] to [facility], pt became hypotensive with BP 80/46. He received 1L NS bolus in the ambulance which did not improve his BP. On arrival to [facility], pt was mentating well, speaking in full sentences, and did not report any additional symptoms.    ED vitals: T 97.9, HR 86, BP 80/46, RR 18, O2 99% on RA  ED labs: WBC 5.78, Hgb 9.3, Plt 140, Cr 3.54, , UA trace leukocyte esterase, many WBCs, 5-10 epithelial cells, many bacteria, few hyaline casts. UTox +marijuana, cocaine  ED studies:  - CXR: No acute infiltrates   - EKG: Normal sinus rhythm  - CT abdomen: Hepatic cirrhosis w/ small ascites, splenomegaly, and anasarca suggesting portal hypertension/volume overload   ED Interventions: Lokelma 10 g x1 dose, 500cc NS bolus      TEDDY BROTHERS [id number] is a 51y yo Male with PMH of ex-alcoholism (sober for 7 months), cirrhosis, smoking (35-40 pack year history), DM, TENZIN (no longer on CPAP due to insurance issues), asthma, PUD, abdominal hernia (per patient), chronic back pain, Hx of gastric bypass, and Hx of multiple paracentesis to abdomen who presents to the ED with generalized weakness, fatigue, decreased appetite, and generalized abdominal discomfort. Pt usually follows at [facility] where he gets his medication refills and a paracentesis every few months. Recently he has been having insurance issues which have prevented him from following up at [facility] or refilling his medications; he has run out of his meds over the past 2 months and has not had a paracentesis in several months. He denies fever, chills, headache, dizziness, lightheadedness, vision changes, chest pain, palpitations, SOB, cough, n/v, diarrhea, constipation, melena, hematochezia, dysuria, hematuria, urinary frequency/urgency, lower extremity edema, numbness, or tingling.    During transportation from [facility] to [facility], pt became hypotensive with BP 80/46. He received 1L NS bolus in the ambulance which did not improve his BP. On arrival to [facility], pt was mentating well, speaking in full sentences, and did not report any additional symptoms.    ED vitals: T 97.9, HR 86, BP 80/46, RR 18, O2 99% on RA  ED labs: WBC 5.78, Hgb 9.3, Plt 140, Cr 3.54, , UA trace leukocyte esterase, many WBCs, 5-10 epithelial cells, many bacteria, few hyaline casts. UTox +marijuana, cocaine  ED studies:  - CXR: No acute infiltrates   - EKG: Normal sinus rhythm  - CT abdomen: Hepatic cirrhosis w/ small ascites, splenomegaly, and anasarca suggesting portal hypertension/volume overload   ED Interventions: Lokelma 10 g x1 dose, 500cc NS bolus

## 2022-09-04 NOTE — H&P ADULT - PROBLEM SELECTOR PLAN 11
Pt has history of chronic back pain and takes gabapentin 300 mg PO TID  - c/w home medication while inpatient

## 2022-09-04 NOTE — H&P ADULT - PROBLEM SELECTOR PLAN 2
Pt has history of cirrhosis 2/2 significant alcohol abuse. Usually follows at [facility] where he gets paracentesis every few months, however due to insurance issues pt has not been able to follow-up at [facility] for several months. On exam, abdomen soft and distended with shifting fluid wave. Pt afebrile with normal WBC count, no concern for SBP. Pt takes spironolactone 100 mg PO daily and lasix 40 mg PO daily for ascites.  - If pt becomes febrile or has increasing WBC count, initiate antibiotic prophylaxis for SBP   - c/w spironolactone 100 mg PO daily and lasix 40 mg PO daily   - Pt will need paracentesis, but non-emergent currently  - f/u TTE (low suspicion for CHF given normal serum BNP) Pt has history of cirrhosis 2/2 significant alcohol abuse. Usually follows at [facility] where he gets paracentesis every few months, however due to insurance issues pt has not been able to follow-up at [facility] for several months. On exam, abdomen soft and distended with shifting fluid wave. Pt afebrile with normal WBC count, no concern for SBP. Pt takes spironolactone 100 mg PO daily and lasix 40 mg PO daily for ascites.  - If pt becomes febrile or has increasing WBC count, initiate antibiotic prophylaxis for SBP   - c/w spironolactone 100 mg PO daily and lasix 40 mg PO daily   - Pt will need paracentesis, but non-emergent currently  - f/u TTE (low suspicion for CHF given normal serum BNP) Pt has history of cirrhosis 2/2 significant alcohol abuse. Usually follows at [facility] where he gets paracentesis every few months, however due to insurance issues pt has not been able to follow-up at [facility] for several months. On exam, abdomen soft and distended with shifting fluid wave. Pt afebrile with normal WBC count, no concern for SBP. Pt takes spironolactone 100 mg PO daily and lasix 40 mg PO daily for ascites.  - If pt becomes febrile or has increasing WBC count, initiate antibiotic prophylaxis for SBP   - c/w spironolactone 100 mg PO daily and lasix 40 mg PO daily   - Pt will need paracentesis, but non-emergent currently  - f/u TTE (low suspicion for CHF given normal serum BNP) Pt has history of cirrhosis 2/2 significant alcohol abuse. Usually follows at [facility] where he gets paracentesis every few months, however due to insurance issues pt has not been able to follow-up at [facility] for several months. On exam, abdomen soft and distended with shifting fluid wave. Pt afebrile with normal WBC count, no concern for SBP. Pt takes spironolactone 100 mg PO daily and lasix 40 mg PO daily for ascites.  - If pt becomes febrile or has increasing WBC count, initiate antibiotic prophylaxis for SBP   - c/w spironolactone 100 mg PO daily and lasix 40 mg PO daily   - Pt will need paracentesis, but non-emergent currently  - f/u TTE (low suspicion for CHF given normal serum BNP)  - f/u GI recs Pt has history of cirrhosis 2/2 significant alcohol abuse. Usually follows at [facility] where he gets paracentesis every few months, however due to insurance issues pt has not been able to follow-up at [facility] for several months. On exam, abdomen soft and distended with shifting fluid wave. Pt afebrile with normal WBC count, no concern for SBP. Pt takes spironolactone 100 mg PO daily and lasix 40 mg PO daily for ascites.  - If pt becomes febrile or has increasing WBC count, initiate antibiotic prophylaxis for SBP   - c/w spironolactone 100 mg PO daily and lasix 40 mg PO daily   - Pt will need paracentesis, but non-emergent currently  - f/u TTE (low suspicion for CHF given normal serum BNP)  - f/u GI recs Pt has history of cirrhosis 2/2 significant alcohol abuse. Usually follows at [facility] where he gets paracentesis every few months, however due to insurance issues pt has not been able to follow-up at [facility] for several months. On exam, abdomen soft and distended with shifting fluid wave. Pt afebrile with normal WBC count, no concern for SBP. Pt takes spironolactone 100 mg PO daily and lasix 40 mg PO daily for ascites.  - If pt becomes febrile or has increasing WBC count, initiate antibiotic prophylaxis for SBP   - c/w spironolactone 100 mg PO daily and lasix 40 mg PO daily   - Pt will need paracentesis, but non-emergent currently  - f/u TTE (low suspicion for CHF given normal serum BNP)  - f/u GI recs

## 2022-09-04 NOTE — H&P ADULT - SOCIAL HISTORY: SUBSTANCE USE
Pt reports smoking marijuana near-daily and occasional cocaine use (few times a month). He denies any history of IVDA or being diagnosed with HIV or hepatitis.

## 2022-09-04 NOTE — CONSULT NOTE ADULT - ASSESSMENT
52 yo M, PMH of EtOH use Disorder in remission, Cirrhosis (presumed EtOH) requiring serial paracentesis, active smoker, former obesity s/p bariatric surgery, DM, TENZIN, asthma, PUD, chronic back pain,  pw generalized weakness, fatigue, decreased appetite, and generalized abdominal discomfort.     GI was consulted for history of cirrhosis and elevated Cr    #Cirrhosis (presumed EtOH), MELD Na - 23-> 19 on repeat  #LINNETTE , unknown baseline, improving  #Small Ascites  #Normocytic Anemia    Recommendations:  - HRS is a diagnosis of exclusion - please trend Cr (via CMP) TID  - Recommend Renal consultation and workup of LINNETTE, which is improving currently (UA, Urine Lytes, Renal US, etc.)  - Please obtain Abdominal Doppler (US w/ Doppler, CT was noncontrast)  - Please obtain records of cirrhosis workup from [facility] - EGD and Path reports, Ascitic fluid studies, Serologic workup of Cirrhosis, etc.  - Given concern for Type 1 HRS (unknown baseline), please dc diuretics until Cr stabilizes (patient is not overtly volume overloaded)  - Recommend diagnostic para to r/o SBP  - Recommend albumin challenge for 48 hours (1gm/kg day1, max 100gm, then 20-40gm day after) - monitor for volume overload  - Avoid all NSAID's (nephrotoxic)    Thank you for the courtesy of this consult. We will follow along with you.    Ottoniel Almaraz M.D.  Gastroenterology Fellow  Pager: [phone] 50 yo M, PMH of EtOH use Disorder in remission, Cirrhosis (presumed EtOH) requiring serial paracentesis, active smoker, former obesity s/p bariatric surgery, DM, TENZIN, asthma, PUD, chronic back pain,  pw generalized weakness, fatigue, decreased appetite, and generalized abdominal discomfort.     GI was consulted for history of cirrhosis and elevated Cr    #Cirrhosis (presumed EtOH), MELD Na - 23-> 19 on repeat  #LINNETTE , unknown baseline, improving  #Small Ascites  #Normocytic Anemia    Recommendations:  - HRS is a diagnosis of exclusion - please trend Cr (via CMP) TID  - Recommend Renal consultation and workup of LINNETTE, which is improving currently (UA, Urine Lytes, Renal US, etc.)  - Please obtain Abdominal Doppler (US w/ Doppler, CT was noncontrast)  - Please obtain records of cirrhosis workup from [facility] - EGD and Path reports, Ascitic fluid studies, Serologic workup of Cirrhosis, etc.  - Given concern for Type 1 HRS (unknown baseline), please dc diuretics until Cr stabilizes (patient is not overtly volume overloaded)  - Recommend diagnostic para to r/o SBP  - Recommend albumin challenge for 48 hours (1gm/kg day1, max 100gm, then 20-40gm day after) - monitor for volume overload  - Avoid all NSAID's (nephrotoxic)    Thank you for the courtesy of this consult. We will follow along with you.    Ottoniel Almaraz M.D.  Gastroenterology Fellow  Pager: [phone] 52 yo M, PMH of EtOH use Disorder in remission, Cirrhosis (presumed EtOH) requiring serial paracentesis, active smoker, former obesity s/p bariatric surgery, DM, TENZIN, asthma, PUD, chronic back pain,  pw generalized weakness, fatigue, decreased appetite, and generalized abdominal discomfort.     GI was consulted for history of cirrhosis and elevated Cr    #Cirrhosis (presumed EtOH), MELD Na - 23-> 19 on repeat  #LINNETTE , unknown baseline, improving  #Small Ascites  #Normocytic Anemia    Recommendations:  - HRS is a diagnosis of exclusion - please trend Cr (via CMP) TID  - Recommend Renal consultation and workup of LINNETTE, which is improving currently (UA, Urine Lytes, Renal US, etc.)  - Please obtain Abdominal Doppler (US w/ Doppler, CT was noncontrast)  - Please obtain records of cirrhosis workup from [facility] - EGD and Path reports, Ascitic fluid studies, Serologic workup of Cirrhosis, etc.  - Given concern for Type 1 HRS (unknown baseline), please dc diuretics until Cr stabilizes (patient is not overtly volume overloaded)  - Recommend diagnostic para to r/o SBP  - Recommend albumin challenge for 48 hours (1gm/kg day1, max 100gm, then 20-40gm day after) - monitor for volume overload  - Avoid all NSAID's (nephrotoxic)    Thank you for the courtesy of this consult. We will follow along with you.    Ottoniel Almaraz M.D.  Gastroenterology Fellow  Pager: [phone]

## 2022-09-04 NOTE — H&P ADULT - PROBLEM SELECTOR PLAN 9
Pt has history of PUD. Denies any melena or hematochezia.   - Protonix 40 mg PO daily while inpatient

## 2022-09-04 NOTE — H&P ADULT - PROBLEM SELECTOR PLAN 12
Pt is currently homeless and living in a shelter. He typically follows at [facility] but has been unable to obtain medical care or medication refills due to insurance issues   - WEN consult Pt is currently homeless and living in a shelter. He typically follows at [facility] but has been unable to obtain medical care or medication refills due to insurance issues   - WEN consult Pt is currently homeless and living in a shelter. He typically follows at  but has been unable to obtain medical care or medication refills due to insurance issues   - WEN consult Pt is currently homeless and living in a shelter. He typically follows at [facility] but has been unable to obtain medical care or medication refills due to insurance issues   -  consult    - DVT ppx: SCD's   - GI ppx: Protonix   - Fluids: s/p 2.5 L NS, 100 albumin  - Electrolytes: replete as needed   - Diet: Dash/CC Pt is currently homeless and living in a shelter. He typically follows at [facility] but has been unable to obtain medical care or medication refills due to insurance issues   -  consult    - DVT ppx: SCD's   - GI ppx: Protonix   - Fluids: s/p 2.5 L NS, 100 albumin  - Electrolytes: replete as needed   - Diet: Dash/CC Pt is currently homeless and living in a shelter. He typically follows at [facility] but has been unable to obtain medical care or medication refills due to insurance issues   -  consult    - DVT ppx: SCD's   - GI ppx: Protonix   - Fluids: s/p 2.5 L NS, 100 albumin  - Electrolytes: replete as needed   - Diet: Dash/CC Pt is currently homeless and living in a shelter. He typically follows at [facility] but has been unable to obtain medical care or medication refills due to insurance issues   -  consult    - DVT ppx: SCD's (Improve score 0)   - GI ppx: Protonix   - Fluids: s/p 2.5 L NS, 100 albumin  - Electrolytes: replete as needed   - Diet: Dash/CC Pt is currently homeless and living in a shelter. He typically follows at [facility] but has been unable to obtain medical care or medication refills due to insurance issues   -  consult    - DVT ppx: SCD's (Improve score 0)   - GI ppx: Protonix   - Fluids: s/p 2.5 L NS, 100 albumin  - Electrolytes: replete as needed   - Diet: Dash/CC Pt is currently homeless and living in a shelter. He typically follows at [facility] but has been unable to obtain medical care or medication refills due to insurance issues   -  consult    - DVT ppx: SCD's (Improve score 0)   - GI ppx: Protonix   - Fluids: s/p 2.5 L NS, 100 albumin  - Electrolytes: replete as needed   - Diet: Dash/CC

## 2022-09-04 NOTE — ED PROVIDER NOTE - OBJECTIVE STATEMENT
Patient is a 51-year-old male with a history of liver cirrhosis, ex alcoholism, has been sober for 7 months, history of peptic ulcer disease, per patient history of abdominal hernia as well, with history of prior multiple paracentesis to the abdomen, who presents with generalized weakness, fatigue, decreased appetite and generalized abdominal discomfort.  Patient denies any recent alcohol, denies chest pain, shortness of breath, palpitations, syncope, fever and chills, headache or neck pain.  Denies history of drug abuse intermittently has used cocaine but nothing recent.  Patient is a daily smoker.  COVID vaccinated.  Denies any recent hospital admissions.  Patient has history of gastric bypass in 2004 current medications include oxycodone for chronic pain management of his lower back pain, spironolactone, omeprazole, famotidine, lisinopril, Lasix 40 mg daily, sucralfate, and gabapentin.  Patient notes that he has ran out of multiple medications and is here also for medication refills.  Patient normally gets his care at [facility] but currently has been displaced and has not been compliant with follow-up. Patient is a 51-year-old male with a history of liver cirrhosis, ex alcoholism, has been sober for 7 months, history of peptic ulcer disease, per patient history of abdominal hernia as well, with history of prior multiple paracentesis to the abdomen, who presents with generalized weakness, fatigue, decreased appetite and generalized abdominal discomfort.  Patient denies any recent alcohol, denies chest pain, shortness of breath, palpitations, syncope, fever and chills, headache or neck pain.  Denies history of drug abuse intermittently has used cocaine but nothing recent.  Patient is a daily smoker.  COVID vaccinated.  Denies any recent hospital admissions.  Patient has history of gastric bypass in 2004 current medications include oxycodone for chronic pain management of his lower back pain, spironolactone, omeprazole, famotidine, lisinopril, Lasix 40 mg daily, sucralfate, and gabapentin.  Patient notes that he has ran out of multiple medications and is here also for medication refills.  Patient normally gets his care at [facility] but currently has been displaced and has not been compliant with follow-up. Patient is a 51-year-old male with a history of liver cirrhosis, ex alcoholism, has been sober for 7 months, history of peptic ulcer disease, per patient history of abdominal hernia as well, with history of prior multiple paracentesis to the abdomen, who presents with generalized weakness, fatigue, decreased appetite and generalized abdominal discomfort.  Patient denies any recent alcohol, denies chest pain, shortness of breath, palpitations, syncope, fever and chills, headache or neck pain.  Denies history of drug abuse intermittently has used cocaine but nothing recent.  Patient is a daily smoker.  COVID vaccinated.  Denies any recent hospital admissions.  Patient has history of gastric bypass in 2004 current medications include oxycodone for chronic pain management of his lower back pain, spironolactone, omeprazole, famotidine, lisinopril, Lasix 40 mg daily, sucralfate, and gabapentin.  Patient notes that he has ran out of multiple medications and is here also for medication refills.  Patient normally gets his care at [facility] but currently has been displaced and has not been compliant with follow-up. Patient is a 51-year-old male with a history of liver cirrhosis, ex alcoholism, has been sober for 7 months, history of peptic ulcer disease, per patient history of abdominal hernia as well, with history of prior multiple paracentesis to the abdomen, who presents with generalized weakness, fatigue, decreased appetite and generalized abdominal discomfort.  Patient denies any recent alcohol, denies chest pain, shortness of breath, palpitations, syncope, fever and chills, headache or neck pain.  Patient endorses he recently ran out of most of his medications in the last 2 months as he has been unable to follow-up with [facility], after having insurance issues where it was canceled for several weeks.  Patient has not taken his main medications in over 2 days.Denies history of drug abuse intermittently has used cocaine but nothing recent.  Patient is a daily smoker.  COVID vaccinated.  Denies any recent hospital admissions.  Patient has history of gastric bypass in 2004 current medications include oxycodone for chronic pain management of his lower back pain, spironolactone, omeprazole, famotidine, lisinopril, Lasix 40 mg daily, sucralfate, and gabapentin.  Patient notes that he has ran out of multiple medications and is here also for medication refills.  Patient normally gets his care at [facility] but currently has been displaced and has not been compliant with follow-up. denies hx of hiv, or hepatitis. denies active or past IVDA. Patient is a 51-year-old male with a history of liver cirrhosis, ex alcoholism, has been sober for 7 months, history of peptic ulcer disease, per patient history of abdominal hernia as well, with history of prior multiple paracentesis to the abdomen, who presents with generalized weakness, fatigue, decreased appetite and generalized abdominal discomfort.  Patient denies any recent alcohol, denies chest pain, shortness of breath, palpitations, syncope, fever and chills, headache or neck pain.  Patient endorses he recently ran out of most of his medications in the last 2 months as he has been unable to follow-up with [facility], after having insurance issues where it was canceled for several weeks.  Patient has not taken his main medications in over 2 days.Denies history of drug abuse intermittently has used cocaine but nothing recent.  Patient is a daily smoker.  COVID vaccinated.  Denies any recent hospital admissions.  Patient has history of gastric bypass in 2004 current medications include oxycodone for chronic pain management of his lower back pain, spironolactone, omeprazole, famotidine, lisinopril, Lasix 40 mg daily, sucralfate, and gabapentin.  Patient notes that he has ran out of multiple medications and is here also for medication refills.  Patient normally gets his care at [facility] but currently has been displaced and has not been compliant with follow-up. denies hx of hiv, or hepatitis. denies active or past IVDA. Patient is a 51-year-old male with a history of liver cirrhosis, ex alcoholism, has been sober for 7 months, history of peptic ulcer disease, per patient history of abdominal hernia as well, with history of prior multiple paracentesis to the abdomen, who presents with generalized weakness, fatigue, decreased appetite and generalized abdominal discomfort.  Patient denies any recent alcohol, denies chest pain, shortness of breath, palpitations, syncope, fever and chills, headache or neck pain.  Patient endorses he recently ran out of most of his medications in the last 2 months as he has been unable to follow-up with [facility], after having insurance issues where it was canceled for several weeks.  Patient has not taken his main medications in over 2 days.Denies history of drug abuse intermittently has used cocaine but nothing recent.  Patient is a daily smoker.  COVID vaccinated.  Denies any recent hospital admissions.  Patient has history of gastric bypass in 2004 current medications include oxycodone for chronic pain management of his lower back pain, spironolactone, omeprazole, famotidine, lisinopril, Lasix 40 mg daily, sucralfate, and gabapentin.  Patient notes that he has ran out of multiple medications and is here also for medication refills.  Patient normally gets his care at [facility] but currently has been displaced and has not been compliant with follow-up. denies hx of hiv, or hepatitis. denies active or past IVDA.

## 2022-09-04 NOTE — ED PROVIDER NOTE - CLINICAL SUMMARY MEDICAL DECISION MAKING FREE TEXT BOX
Patient with history of liver cirrhosis, ex alcoholism, sober now for 7 months, history of hypertension, GERD, peptic ulcer disease, chronic pain who presents with generalized weakness and fatigue with abdominal discomfort.  Abdominal exam has no signs of ascites or peritoneal signs do not suspect SBP as patient is afebrile here in the ED, is alert and oriented x3 and ambulatory and appears nontoxic.  Plan is to check all labs including troponin, electrolytes, image CT with abdomen pelvis.  Patient is currently not in an acute painful distress and appears comfortable.

## 2022-09-04 NOTE — H&P ADULT - PROBLEM SELECTOR PLAN 10
Pt reports a history of morbid obesity (improved s/p gastric bypass) and diabetes (previously took metformin and insulin). He does not currently take any medications for his diabetes.  - f/u A1c Pt reports a history of morbid obesity (improved s/p gastric bypass) and diabetes (previously took metformin and insulin). He does not currently take any medications for his diabetes.  - f/u A1c  - [facility] inpatient Pt reports a history of morbid obesity (improved s/p gastric bypass) and diabetes (previously took metformin and insulin). He does not currently take any medications for his diabetes.  - f/u A1c  - [facility] inpatient Pt reports a history of morbid obesity (improved s/p gastric bypass) and diabetes (previously took metformin and insulin). He does not currently take any medications for his diabetes.  - f/u A1c  - [facility] inpatient

## 2022-09-04 NOTE — H&P ADULT - SOCIAL HISTORY: TOBACCO USE
Pt has extensive smoking history. He currently smokes 6-7 cigarettes a day; at his peak he smoked 2 packs per day. He has smoked for 25 years.

## 2022-09-04 NOTE — H&P ADULT - ASSESSMENT
Pt is a 52 yo M w/ PMH ex-alcoholism, cirrhosis, smoking, DM, TENZIN, asthma, PUD, abdominal hernia (per patient), Hx of gastric bypass, and Hx of multiple paracentesis to abdomen who presents to ED with generalized weakness, fatigue, decreased appetite, and generalized abdominal discomfort. Pt is a 50 yo M w/ PMH ex-alcoholism, cirrhosis, smoking, DM, TENZIN, asthma, PUD, abdominal hernia (per patient), Hx of gastric bypass, and Hx of multiple paracentesis to abdomen who presents to ED with generalized weakness, fatigue, decreased appetite, and generalized abdominal discomfort. Pt is a 50 yo M w/ PMH ex-alcoholism, cirrhosis, smoking, DM, TENZIN, asthma, PUD, abdominal hernia (per patient), chronic back pain, Hx of gastric bypass, and Hx of multiple paracentesis to abdomen who presents to ED with generalized weakness, fatigue, decreased appetite, and generalized abdominal discomfort. Pt is a 52 yo M w/ PMH ex-alcoholism, cirrhosis, smoking, DM, TENZIN, asthma, PUD, abdominal hernia (per patient), chronic back pain, Hx of gastric bypass, and Hx of multiple paracentesis to abdomen who presents to ED with generalized weakness, fatigue, decreased appetite, and generalized abdominal discomfort.

## 2022-09-04 NOTE — H&P ADULT - NSHPSOURCEINFORD_GEN_ALL_CORE
Chart(s)/Patient Chart(s)/Patient/Child Chart(s)/Patient/Home Health Aide or Other Non-Family Caregiver

## 2022-09-04 NOTE — ED ADULT NURSE NOTE - NSIMPLEMENTINTERV_GEN_ALL_ED
Implemented All Universal Safety Interventions:  [place] to call system. Call bell, personal items and telephone within reach. Instruct patient to call for assistance. Room bathroom lighting operational. Non-slip footwear when patient is off stretcher. Physically safe environment: no spills, clutter or unnecessary equipment. Stretcher in lowest position, wheels locked, appropriate side rails in place. Implemented All Universal Safety Interventions:  [place] to call system. Call bell, personal items and telephone within reach. Instruct patient to call for assistance. Room bathroom lighting operational. Non-slip footwear when patient is off stretcher. Physically safe environment: no spills, clutter or unnecessary equipment. Stretcher in lowest position, wheels locked, appropriate side rails in place. Implemented All Universal Safety Interventions:  [place] to call system. Call bell, personal items and telephone within reach. Instruct patient to call for assistance. Room bathroom lighting operational. Non-slip footwear when patient is off stretcher. Physically safe environment: no spills, clutter or unnecessary equipment. Stretcher in lowest position, wheels locked, appropriate side rails in place.

## 2022-09-04 NOTE — H&P ADULT - PROBLEM SELECTOR PLAN 7
Pt has history of TENZIN and previously used CPAP, however he has not used CPAP for many years due to insurance issues  - CPAP while inpatient   -  for help with obtaining CPAP at home Pt has no signs or symptoms of respiratory distress. No wheezing on exam.  - Duo nebs q6h PRN for wheezing or SOB  - Monitor respiratory status

## 2022-09-04 NOTE — H&P ADULT - NSHPLABSRESULTS_GEN_ALL_CORE
LABS:                        9.3    5.78  )-----------( 140      ( 04 Sep 2022 00:44 )             29.5         138  |  105  |  115<H>  ----------------------------<  63<L>  5.1   |  23  |  3.41<H>    Ca    8.9      04 Sep 2022 03:51    TPro  8.0  /  Alb  3.1<L>  /  TBili  0.5  /  DBili  x   /  AST  19  /  ALT  15  /  AlkPhos  87  04      PT/INR - ( 04 Sep 2022 00:44 )   PT: 13.9 sec;   INR: 1.19          PTT - ( 04 Sep 2022 00:44 )  PTT:39.7 sec  CAPILLARY BLOOD GLUCOSE      POCT Blood Glucose.: 98 mg/dL (04 Sep 2022 10:32)      CARDIAC MARKERS ( 04 Sep 2022 00:44 )  x     / x     / 35 U/L / x     / x              Urinalysis Basic - ( 04 Sep 2022 06:17 )    Color: Yellow / Appearance: Clear / S.015 / pH: x  Gluc: x / Ketone: NEGATIVE  / Bili: NEGATIVE / Urobili: 0.2 E.U./dL   Blood: x / Protein: NEGATIVE mg/dL / Nitrite: NEGATIVE   Leuk Esterase: Trace / RBC: < 5 /HPF / WBC Many /HPF   Sq Epi: x / Non Sq Epi: 5-10 /HPF / Bacteria: Many /HPF                I & O Summary:    22 @ 07:01  -  22 @ 12:19  --------------------------------------------------------  IN: 2000 mL / OUT: 0 mL / NET: 2000 mL        Microbiology:        RADIOLOGY, EKG AND ADDITIONAL TESTS: Reviewed.

## 2022-09-04 NOTE — H&P ADULT - ATTENDING COMMENTS
Pt w/weakness, fatigue, abd pain in the setting of presumed alcoholic cirrhosis. No HE, SBP, GI bleed, or severe ascites, however elevated creatinine with no known baseline c/f HRS, will r/o other causes/ATN and f/u albumin challenge. asymmetric blood pressure but concordant pulses calls to question atypical thrombosis or dissection, however IV contrast studies contraindicated d/t elev Cr. closer to normotensive on the opposite arm, no signs of poor perfusion. will f/u GI and Nephro consult.

## 2022-09-04 NOTE — CONSULT NOTE ADULT - ASSESSMENT
Patient is a 51y yo Male with PMH of ex-alcoholism (sober for 7 months), cirrhosis, smoking (35-40 pack year history), DM, TENZIN (no longer on CPAP due to insurance issues), asthma, PUD, abdominal hernia (per patient), chronic back pain, Hx of gastric bypass, and Hx of multiple paracentesis to abdomen who presents to the ED with generalized weakness, fatigue, decreased appetite, and generalized abdominal discomfort. Pt usually follows at [facility] where he gets his medication refills and a paracentesis every few months. Recently he has been having insurance issues which have prevented him from following up at [facility] or refilling his medications; he has run out of his meds over the past 2 months and has not had a paracentesis in several months. During transportation from [facility] to [facility], pt became hypotensive with BP 80/46. He received 1L NS bolus in the ambulance which did not improve his BP. On arrival to [facility], pt was mentating well, speaking in full sentences, and did not report any additional symptoms. Nephrology consulted for LINNETTE.             #LINNETTE on CKD? patient unaware of his baseline Cr   likely etiology ischemic ATN given documented hypotensive episodes, vs hypoperfusional secondary to possible HRS vs volume depletion from either  recently large volume paracentesis and no albumin given or over diuresis   other etiology but less likely abdominal compartment syndrome   No response to crystalloid fluid   Admission Cr 3.41    Recommend  Trend Daily BMP with magnesium and phos   Obtain documents from [facility]   GI Consult   SBP ppx   Monitor Urine output   Strict IN and outs   Urine analysis   Urine Na+, Urine Cr, Urine Urea, UPCR    Hold Diuretics   [place] with Albumin   Avoid NSAIDS   Renally dose medications        Patient is a 51y yo Male with PMH of ex-alcoholism (sober for 7 months), cirrhosis, smoking (35-40 pack year history), DM, TENZIN (no longer on CPAP due to insurance issues), asthma, PUD, abdominal hernia (per patient), chronic back pain, Hx of gastric bypass, and Hx of multiple paracentesis to abdomen who presents to the ED with generalized weakness, fatigue, decreased appetite, and generalized abdominal discomfort. Pt usually follows at [facility] where he gets his medication refills and a paracentesis every few months. Recently he has been having insurance issues which have prevented him from following up at [facility] or refilling his medications; he has run out of his meds over the past 2 months and has not had a paracentesis in several months. During transportation from [facility] to [facility], pt became hypotensive with BP 80/46. He received 1L NS bolus in the ambulance which did not improve his BP. On arrival to [facility], pt was mentating well, speaking in full sentences, and did not report any additional symptoms. Nephrology consulted for LINNETTE.             #LINNETTE on CKD? patient unaware of his baseline Cr   likely etiology ischemic ATN given documented hypotensive episodes, vs hypoperfusional secondary to possible HRS vs volume depletion from either  recently large volume paracentesis and no albumin given or over diuresis   other etiology but less likely abdominal compartment syndrome   No response to crystalloid fluid   Admission Cr 3.41    Recommend  Trend Daily BMP with magnesium and phos   Obtain documents from [facility]   GI Consult   SBP ppx   Monitor Urine output   Strict IN and outs   Urine analysis   Urine Na+, Urine Cr, Urine Urea, UPCR    Hold Diuretics   [place] with Albumin   Avoid NSAIDS   Renally dose medications        Patient is a 51y yo Male with PMH of ex-alcoholism (sober for 7 months), cirrhosis, smoking (35-40 pack year history), DM, TENZIN (no longer on CPAP due to insurance issues), asthma, PUD, abdominal hernia (per patient), chronic back pain, Hx of gastric bypass, and Hx of multiple paracentesis to abdomen who presents to the ED with generalized weakness, fatigue, decreased appetite, and generalized abdominal discomfort. Pt usually follows at [facility] where he gets his medication refills and a paracentesis every few months. Recently he has been having insurance issues which have prevented him from following up at [facility] or refilling his medications; he has run out of his meds over the past 2 months and has not had a paracentesis in several months. During transportation from [facility] to [facility], pt became hypotensive with BP 80/46. He received 1L NS bolus in the ambulance which did not improve his BP. On arrival to [facility], pt was mentating well, speaking in full sentences, and did not report any additional symptoms. Nephrology consulted for LINNETTE.             #LINNETTE on CKD? patient unaware of his baseline Cr   likely etiology ischemic ATN given documented hypotensive episodes, vs hypoperfusional secondary to possible HRS vs volume depletion from either  recently large volume paracentesis and no albumin given or over diuresis   other etiology but less likely abdominal compartment syndrome   No response to crystalloid fluid   Admission Cr 3.41    Recommend  Trend Daily BMP with magnesium and phos   Obtain documents from [facility]   GI Consult   SBP ppx   Monitor Urine output   Strict IN and outs   Urine analysis   Urine Na+, Urine Cr, Urine Urea, UPCR    Hold Diuretics   [place] with Albumin   Avoid NSAIDS   Renally dose medications

## 2022-09-04 NOTE — H&P ADULT - PROBLEM SELECTOR PLAN 8
Pt has history of PUD Pt has history of TENZIN and previously used CPAP, however he has not used CPAP for many years due to insurance issues  - CPAP while inpatient   -  for help with obtaining CPAP at home

## 2022-09-04 NOTE — H&P ADULT - PROBLEM SELECTOR PLAN 4
Pt has 35-40 pack year smoking history. He currently smokes 6-7 cigarettes/day, down from 2 packs/day at his peak  - Pt currently has nicotine patch 14 mg daily for withdrawal symptoms; can increase dosage as needed Pt has significant history of alcohol abuse, and was drinking 24 beers and 1 pint of hard liquor a day for many years at his peak. He has been sober for 7 months. CIWA 0, no concern for acute alcohol withdrawal. Pt takes acamprosate 333 mg TID for alcohol use disorder.  - Restart acamprosate on discharge

## 2022-09-04 NOTE — H&P ADULT - PROBLEM SELECTOR PLAN 1
While being transported from [facility] to [facility], pt became hypotensive with BP 80/46. He received 1L NS bolus in the ambulance which did not improve his BP. Pt is afebrile, mentating well, speaking in full sentences, with no evidence of sepsis or bleeding. Concern for hepatorenal syndrome given rising creatinine i/s/o cirrhosis.  - Pt is currently s/p an additional 1L NS bolus and 100 cc albumin challenge  - If BP remains low after additional fluids, re-check in other arm. If BP low in both arms, or pt becomes more hemodynamically unstable, low threshold for ICU consult   - f/u GI recs   - f/u Nephrology recs While being transported from [facility] to [facility], pt became hypotensive with BP 80/46. He received 1L NS bolus in the ambulance which did not improve his BP. Pt is afebrile, mentating well, speaking in full sentences, with no evidence of sepsis or bleeding. Concern for hepatorenal syndrome given rising creatinine i/s/o cirrhosis.  - Pt is currently s/p an additional 1L NS bolus and 100 cc albumin challenge  - If BP remains low after additional fluids, re-check in other arm. If BP low in both arms, or pt becomes more hemodynamically unstable, low threshold for ICU consult   - f/u GI recs   - f/u Nephrology recs While being transported from [facility] to [facility], pt became hypotensive with BP 80/46. He received 1L NS bolus in the ambulance which did not improve his BP. Pt is afebrile, mentating well, speaking in full sentences, with no evidence of sepsis or bleeding. Concern for hepatorenal syndrome given rising creatinine i/s/o cirrhosis.  - Pt is currently s/p an additional 1L NS bolus and 100 cc albumin challenge  - If BP remains low after additional fluids, re-check in other arm. If BP low in both arms, or pt becomes more hemodynamically unstable, low threshold for ICU consult   - f/u GI recs   - f/u Nephrology recs

## 2022-09-04 NOTE — H&P ADULT - PROBLEM SELECTOR PLAN 5
Pt has history of HTN and takes lisinopril-HCTZ 20 mg-25 mg PO daily   - Hold home medication i/s/o hypotension; can restart on discharge Pt has 35-40 pack year smoking history. He currently smokes 6-7 cigarettes/day, down from 2 packs/day at his peak  - Pt currently has nicotine patch 14 mg daily for withdrawal symptoms; can increase dosage as needed Pt has 35-40 pack year smoking history. He currently smokes 6-7 cigarettes/day, down from 2 packs/day at his peak  - Pt currently has nicotine patch 14 mg daily for withdrawal symptoms; can increase dosage as needed    #Abnormal BP in both arms:   On admission, found to have low BP in R arm (systolic 80s-90s) and normal BP in L arm (systolic high teens). Unclear reasoning at this time.  - F/u TTE

## 2022-09-04 NOTE — ED ADULT TRIAGE NOTE - CHIEF COMPLAINT QUOTE
BIBA from shelter c/o abdominal pain. per pt, "I have an ulcer in my stomach, and a hernia in my liver. It needs to be drained." pt states "I don't know what medication I take they're all in my bag."   denies nausea/vomiting.

## 2022-09-04 NOTE — H&P ADULT - PROBLEM SELECTOR PLAN 6
Pt has no signs or symptoms of respiratory distress. No wheezing on exam.  - Duo nebs q6h PRN for wheezing or SOB  - Monitor respiratory status Pt has history of HTN and takes lisinopril-HCTZ 20 mg-25 mg PO daily   - Hold home medication i/s/o hypotension; can restart on discharge Pt has history of HTN and takes lisinopril-HCTZ 20 mg-25 mg PO daily   - Hold home medication i/s/o hypotension; can restart on discharge or as clinically indicated

## 2022-09-04 NOTE — H&P ADULT - SOCIAL HISTORY: ALCOHOL USE
Pt has extensive alcohol abuse history but has been sober for 7 months. Prior to his sobriety, he reports drinking 24 beers and 1 pint of Clarissa on a daily basis for many years. Pt has extensive alcohol abuse history but has been sober for 7 months. Prior to his sobriety, he reports drinking 24 beers and 1 pint of hard liquor on a daily basis for many years.

## 2022-09-04 NOTE — CONSULT NOTE ADULT - ATTENDING COMMENTS
Patient seen and discussed with GI fellow; plan as noted above.    Patient with cirrhosis and elevated Cr.   F/u renal recommendations.   Abdominal ultrasound with dopplers.   Records from [facility].   Para to r/o SBP.   Albumin challenge.   Hold diuretics. Patient seen and discussed with GI fellow; plan as noted above.    Patient with cirrhosis and elevated Cr.   F/u renal recommendations.   Abdominal ultrasound with dopplers.   Records from [facility].   Para to r/o SBP.   Albumin challenge.   Hold diuretics. Patient seen and discussed with GI fellow; plan as noted above.    Patient with cirrhosis and elevated Cr.   F/u renal recommendations.   Abdominal ultrasound with dopplers.   Records from [facility].   Para to r/o SBP.   Albumin challenge.   Hold diuretics.

## 2022-09-04 NOTE — CONSULT NOTE ADULT - SUBJECTIVE AND OBJECTIVE BOX
HPI:  Patient is a 51y yo Male with PMH of ex-alcoholism (sober for 7 months), cirrhosis, smoking (35-40 pack year history), DM, TENZIN (no longer on CPAP due to insurance issues), asthma, PUD, abdominal hernia (per patient), chronic back pain, Hx of gastric bypass, and Hx of multiple paracentesis to abdomen who presents to the ED with generalized weakness, fatigue, decreased appetite, and generalized abdominal discomfort. Pt usually follows at [facility] where he gets his medication refills and a paracentesis every few months. Recently he has been having insurance issues which have prevented him from following up at [facility] or refilling his medications; he has run out of his meds over the past 2 months and has not had a paracentesis in several months. During transportation from [facility] to [facility], pt became hypotensive with BP 80/46. He received 1L NS bolus in the ambulance which did not improve his BP. On arrival to [facility], pt was mentating well, speaking in full sentences, and did not report any additional symptoms. Nephrology consulted for LINNETTE.             PAST MEDICAL & SURGICAL HISTORY:  Diabetes mellitus      Alcohol abuse      Smoking      TENZIN (obstructive sleep apnea)      Asthma      Peptic ulcer disease      Cirrhosis      H/O gastric bypass            Allergies:  No Known Allergies      Home Medications:       Hospital Medications:   MEDICATIONS  (STANDING):  dextrose 5%. 1000 milliLiter(s) (50 mL/Hr) IV Continuous <Continuous>  dextrose 5%. 1000 milliLiter(s) (100 mL/Hr) IV Continuous <Continuous>  dextrose 50% Injectable 25 Gram(s) IV Push once  dextrose 50% Injectable 12.5 Gram(s) IV Push once  dextrose 50% Injectable 25 Gram(s) IV Push once  furosemide    Tablet 40 milliGRAM(s) Oral every 24 hours  gabapentin 300 milliGRAM(s) Oral three times a day  glucagon  Injectable 1 milliGRAM(s) IntraMuscular once  influenza   Vaccine 0.5 milliLiter(s) IntraMuscular once  insulin lispro (ADMELOG) corrective regimen sliding scale   SubCutaneous Before meals and at bedtime  nicotine -  14 mG/24Hr(s) Patch 1 Patch Transdermal daily  pantoprazole    Tablet 40 milliGRAM(s) Oral before breakfast  spironolactone 100 milliGRAM(s) Oral every 24 hours  sucralfate 1 Gram(s) Oral four times a day      SOCIAL HISTORY:  Denies ETOh, Smoking    Family History:  FAMILY HISTORY:  FH: kidney disease    FH: diabetes mellitus          VITALS:  T(F): 97.9 (22 @ 09:41), Max: 97.9 (22 @ 00:15)  HR: 90 (22 @ 10:21)  BP: 93/60 (22 @ 10:21)  RR: 17 (22 @ 09:41)  SpO2: 98% (22 @ 09:41)  Wt(kg): --     @ 07:01  -   @ 14:02  --------------------------------------------------------  IN: 2000 mL / OUT: 0 mL / NET: 2000 mL      Height (cm): 157.5 ( @ 11:09)  Weight (kg): 86.2 ( @ 11:09)  BMI (kg/m2): 34.7 ( @ 11:09)  BSA (m2): 1.87 ( @ 11:09)  CAPILLARY BLOOD GLUCOSE      POCT Blood Glucose.: 98 mg/dL (04 Sep 2022 10:32)      Review of Systems:  CONSTITUTIONAL: No fever or chills.  RESPIRATORY: No shortness of breath, cough  CARDIOVASCULAR: No Chest pain, shortness of breath, palpitations  GASTROINTESTINAL: No abdominal pain, nausea, vomiting, diarrhea  GENITOURINARY: No urinary frequency, gross hematuria, dysuria  NEUROLOGICAL: No headache, weakness  SKIN: No rash or skin lesion  MUSCULOSKELETAL: No swelling  Psych: Denies suicidal or homicidal ideation    PHYSICAL EXAM:  CONSTITUTIONAL: Lying in bed, in NAD  HEENT: NC/AT, PERRLA, EOMI, OP clear, MMM, neck supple  RESP: No respiratory distress, no use of accessory muscles; CTA b/l, no rales, ronchi, or wheezing   CV: RRR, +S1S2, no MRG; no peripheral edema  GI: Distended with shifting fluid wave. Mild tenderness to palpation in RUQ. Otherwise soft, no rebound or guarding, no palpable masses or hernia.  MSK: Normal ROM without pain, normal muscle strength/tone  NEURO: CN II-XII intact, no focal neurologic deficits    PSYCH: A&Ox3, normal mood and affect    LABS:      138  |  105  |  115<H>  ----------------------------<  63<L>  5.1   |  23  |  3.41<H>    Ca    8.9      04 Sep 2022 03:51    TPro  8.0  /  Alb  3.1<L>  /  TBili  0.5  /  DBili      /  AST  19  /  ALT  15  /  AlkPhos  87      Creatinine Trend: 3.41 <--, 3.54 <--                        9.3    5.78  )-----------( 140      ( 04 Sep 2022 00:44 )             29.5     Urine Studies:  Urinalysis Basic - ( 04 Sep 2022 06:17 )    Color: Yellow / Appearance: Clear / S.015 / pH:   Gluc:  / Ketone: NEGATIVE  / Bili: NEGATIVE / Urobili: 0.2 E.U./dL   Blood:  / Protein: NEGATIVE mg/dL / Nitrite: NEGATIVE   Leuk Esterase: Trace / RBC: < 5 /HPF / WBC Many /HPF   Sq Epi:  / Non Sq Epi: 5-10 /HPF / Bacteria: Many /HPF                     HPI:  Patient is a 51y yo Male with PMH of ex-alcoholism (sober for 7 months), cirrhosis, smoking (35-40 pack year history), DM, TENZIN (no longer on CPAP due to insurance issues), asthma, PUD, abdominal hernia (per patient), chronic back pain, Hx of gastric bypass, and Hx of multiple paracentesis to abdomen who presents to the ED with generalized weakness, fatigue, decreased appetite, and generalized abdominal discomfort. Pt usually follows at [facility] where he gets his medication refills and a paracentesis every few months. Recently he has been having insurance issues which have prevented him from following up at [facility] or refilling his medications; he has run out of his meds over the past 2 months and has not had a paracentesis in several months. During transportation from [facility] to [facility], pt became hypotensive with BP 80/46. He received 1L NS bolus in the ambulance which did not improve his BP. On arrival to [facility], pt was mentating well, speaking in full sentences, and did not report any additional symptoms. Nephrology consulted for LINNETTE.             PAST MEDICAL & SURGICAL HISTORY:  Diabetes mellitus      Alcohol abuse      Smoking      TENZIN (obstructive sleep apnea)      Asthma      Peptic ulcer disease      Cirrhosis      H/O gastric bypass            Allergies:  No Known Allergies      Home Medications:       Hospital Medications:   MEDICATIONS  (STANDING):  dextrose 5%. 1000 milliLiter(s) (50 mL/Hr) IV Continuous <Continuous>  dextrose 5%. 1000 milliLiter(s) (100 mL/Hr) IV Continuous <Continuous>  dextrose 50% Injectable 25 Gram(s) IV Push once  dextrose 50% Injectable 12.5 Gram(s) IV Push once  dextrose 50% Injectable 25 Gram(s) IV Push once  furosemide    Tablet 40 milliGRAM(s) Oral every 24 hours  gabapentin 300 milliGRAM(s) Oral three times a day  glucagon  Injectable 1 milliGRAM(s) IntraMuscular once  influenza   Vaccine 0.5 milliLiter(s) IntraMuscular once  insulin lispro (ADMELOG) corrective regimen sliding scale   SubCutaneous Before meals and at bedtime  nicotine -  14 mG/24Hr(s) Patch 1 Patch Transdermal daily  pantoprazole    Tablet 40 milliGRAM(s) Oral before breakfast  spironolactone 100 milliGRAM(s) Oral every 24 hours  sucralfate 1 Gram(s) Oral four times a day      SOCIAL HISTORY:  Denies ETOh, Smoking    Family History:  FAMILY HISTORY:  FH: kidney disease    FH: diabetes mellitus          VITALS:  T(F): 97.9 (22 @ 09:41), Max: 97.9 (22 @ 00:15)  HR: 90 (22 @ 10:21)  BP: 93/60 (22 @ 10:21)  RR: 17 (22 @ 09:41)  SpO2: 98% (22 @ 09:41)  Wt(kg): --     @ 07:01  -   @ 14:02  --------------------------------------------------------  IN: 2000 mL / OUT: 0 mL / NET: 2000 mL      Height (cm): 157.5 ( @ 11:09)  Weight (kg): 86.2 ( @ 11:09)  BMI (kg/m2): 34.7 ( @ 11:09)  BSA (m2): 1.87 ( @ 11:09)  CAPILLARY BLOOD GLUCOSE      POCT Blood Glucose.: 98 mg/dL (04 Sep 2022 10:32)      Review of Systems:  CONSTITUTIONAL: No fever or chills.  RESPIRATORY: No shortness of breath, cough  CARDIOVASCULAR: No Chest pain, shortness of breath, palpitations  GASTROINTESTINAL: No abdominal pain, nausea, vomiting, diarrhea  GENITOURINARY: No urinary frequency, gross hematuria, dysuria  NEUROLOGICAL: No headache, weakness  SKIN: No rash or skin lesion  MUSCULOSKELETAL: No swelling  Psych: Denies suicidal or homicidal ideation    PHYSICAL EXAM:  CONSTITUTIONAL: Lying in bed, in NAD  HEENT: NC/AT, PERRLA, EOMI, OP clear, MMM, neck supple  RESP: No respiratory distress, no use of accessory muscles; CTA b/l, no rales, ronchi, or wheezing   CV: RRR, +S1S2, no MRG; no peripheral edema  GI: Distended with shifting fluid wave. Mild tenderness to palpation in RUQ. Otherwise soft, no rebound or guarding, no palpable masses or hernia.  MSK: Normal ROM without pain, normal muscle strength/tone  NEURO: CN II-XII intact, no focal neurologic deficits    PSYCH: A&Ox3, normal mood and affect    LABS:      138  |  105  |  115<H>  ----------------------------<  63<L>  5.1   |  23  |  3.41<H>    Ca    8.9      04 Sep 2022 03:51    TPro  8.0  /  Alb  3.1<L>  /  TBili  0.5  /  DBili      /  AST  19  /  ALT  15  /  AlkPhos  87      Creatinine Trend: 3.41 <--, 3.54 <--                        9.3    5.78  )-----------( 140      ( 04 Sep 2022 00:44 )             29.5     Urine Studies:  Urinalysis Basic - ( 04 Sep 2022 06:17 )    Color: Yellow / Appearance: Clear / S.015 / pH:   Gluc:  / Ketone: NEGATIVE  / Bili: NEGATIVE / Urobili: 0.2 E.U./dL   Blood:  / Protein: NEGATIVE mg/dL / Nitrite: NEGATIVE   Leuk Esterase: Trace / RBC: < 5 /HPF / WBC Many /HPF   Sq Epi:  / Non Sq Epi: 5-10 /HPF / Bacteria: Many /HPF                     HPI:  Patient is a 51y yo Male with PMH of ex-alcoholism (sober for 7 months), cirrhosis, smoking (35-40 pack year history), DM, TENZIN (no longer on CPAP due to insurance issues), asthma, PUD, abdominal hernia (per patient), chronic back pain, Hx of gastric bypass, and Hx of multiple paracentesis to abdomen who presents to the ED with generalized weakness, fatigue, decreased appetite, and generalized abdominal discomfort. Pt usually follows at [facility] where he gets his medication refills and a paracentesis every few months. Recently he has been having insurance issues which have prevented him from following up at [facility] or refilling his medications; he has run out of his meds over the past 2 months and has not had a paracentesis in several months. During transportation from [facility] to [facility], pt became hypotensive with BP 80/46. He received 1L NS bolus in the ambulance which did not improve his BP. On arrival to [facility], pt was mentating well, speaking in full sentences, and did not report any additional symptoms. Nephrology consulted for LINNETTE.             PAST MEDICAL & SURGICAL HISTORY:  Diabetes mellitus      Alcohol abuse      Smoking      TENZIN (obstructive sleep apnea)      Asthma      Peptic ulcer disease      Cirrhosis      H/O gastric bypass            Allergies:  No Known Allergies      Home Medications:       Hospital Medications:   MEDICATIONS  (STANDING):  dextrose 5%. 1000 milliLiter(s) (50 mL/Hr) IV Continuous <Continuous>  dextrose 5%. 1000 milliLiter(s) (100 mL/Hr) IV Continuous <Continuous>  dextrose 50% Injectable 25 Gram(s) IV Push once  dextrose 50% Injectable 12.5 Gram(s) IV Push once  dextrose 50% Injectable 25 Gram(s) IV Push once  furosemide    Tablet 40 milliGRAM(s) Oral every 24 hours  gabapentin 300 milliGRAM(s) Oral three times a day  glucagon  Injectable 1 milliGRAM(s) IntraMuscular once  influenza   Vaccine 0.5 milliLiter(s) IntraMuscular once  insulin lispro (ADMELOG) corrective regimen sliding scale   SubCutaneous Before meals and at bedtime  nicotine -  14 mG/24Hr(s) Patch 1 Patch Transdermal daily  pantoprazole    Tablet 40 milliGRAM(s) Oral before breakfast  spironolactone 100 milliGRAM(s) Oral every 24 hours  sucralfate 1 Gram(s) Oral four times a day      SOCIAL HISTORY:  Denies ETOh, Smoking    Family History:  FAMILY HISTORY:  FH: kidney disease    FH: diabetes mellitus          VITALS:  T(F): 97.9 (22 @ 09:41), Max: 97.9 (22 @ 00:15)  HR: 90 (22 @ 10:21)  BP: 93/60 (22 @ 10:21)  RR: 17 (22 @ 09:41)  SpO2: 98% (22 @ 09:41)  Wt(kg): --     @ 07:01  -   @ 14:02  --------------------------------------------------------  IN: 2000 mL / OUT: 0 mL / NET: 2000 mL      Height (cm): 157.5 ( @ 11:09)  Weight (kg): 86.2 ( @ 11:09)  BMI (kg/m2): 34.7 ( @ 11:09)  BSA (m2): 1.87 ( @ 11:09)  CAPILLARY BLOOD GLUCOSE      POCT Blood Glucose.: 98 mg/dL (04 Sep 2022 10:32)      Review of Systems:  CONSTITUTIONAL: No fever or chills.  RESPIRATORY: No shortness of breath, cough  CARDIOVASCULAR: No Chest pain, shortness of breath, palpitations  GASTROINTESTINAL: No abdominal pain, nausea, vomiting, diarrhea  GENITOURINARY: No urinary frequency, gross hematuria, dysuria  NEUROLOGICAL: No headache, weakness  SKIN: No rash or skin lesion  MUSCULOSKELETAL: No swelling  Psych: Denies suicidal or homicidal ideation    PHYSICAL EXAM:  CONSTITUTIONAL: Lying in bed, in NAD  HEENT: NC/AT, PERRLA, EOMI, OP clear, MMM, neck supple  RESP: No respiratory distress, no use of accessory muscles; CTA b/l, no rales, ronchi, or wheezing   CV: RRR, +S1S2, no MRG; no peripheral edema  GI: Distended with shifting fluid wave. Mild tenderness to palpation in RUQ. Otherwise soft, no rebound or guarding, no palpable masses or hernia.  MSK: Normal ROM without pain, normal muscle strength/tone  NEURO: CN II-XII intact, no focal neurologic deficits    PSYCH: A&Ox3, normal mood and affect    LABS:      138  |  105  |  115<H>  ----------------------------<  63<L>  5.1   |  23  |  3.41<H>    Ca    8.9      04 Sep 2022 03:51    TPro  8.0  /  Alb  3.1<L>  /  TBili  0.5  /  DBili      /  AST  19  /  ALT  15  /  AlkPhos  87      Creatinine Trend: 3.41 <--, 3.54 <--                        9.3    5.78  )-----------( 140      ( 04 Sep 2022 00:44 )             29.5     Urine Studies:  Urinalysis Basic - ( 04 Sep 2022 06:17 )    Color: Yellow / Appearance: Clear / S.015 / pH:   Gluc:  / Ketone: NEGATIVE  / Bili: NEGATIVE / Urobili: 0.2 E.U./dL   Blood:  / Protein: NEGATIVE mg/dL / Nitrite: NEGATIVE   Leuk Esterase: Trace / RBC: < 5 /HPF / WBC Many /HPF   Sq Epi:  / Non Sq Epi: 5-10 /HPF / Bacteria: Many /HPF

## 2022-09-04 NOTE — H&P ADULT - NSICDXPASTMEDICALHX_GEN_ALL_CORE_FT
PAST MEDICAL HISTORY:  Alcohol abuse     Asthma     Cirrhosis     Diabetes mellitus     TENZIN (obstructive sleep apnea)     Peptic ulcer disease     Smoking

## 2022-09-04 NOTE — PATIENT PROFILE ADULT - FALL HARM RISK - HARM RISK INTERVENTIONS
Assistance with ambulation/Assistance OOB with selected safe patient handling equipment/Communicate Risk of Fall with Harm to all staff/Discuss with provider need for PT consult/Monitor for mental status changes/Monitor gait and stability/Orthostatic vital signs/Provide patient with walking aids - walker, cane, crutches/Reinforce activity limits and safety measures with patient and family/Reorient to person, place and time as needed/Sit up slowly, dangle for a short time, stand at bedside before walking/Tailored Fall Risk Interventions/Toileting schedule using arm’s reach rule for commode and bathroom/Use of alarms - bed, chair and/or voice tab/Visual Cue: Yellow wristband and red socks/Bed in lowest position, wheels locked, appropriate side rails in place/Call bell, personal items and telephone in reach/Instruct patient to call for assistance before getting out of bed or chair/Non-slip footwear when patient is out of bed/[place] to call system/Physically safe environment - no spills, clutter or unnecessary equipment/Purposeful Proactive Rounding/Room/bathroom lighting operational, light cord in reach Assistance with ambulation/Assistance OOB with selected safe patient handling equipment/Communicate Risk of Fall with Harm to all staff/Discuss with provider need for PT consult/Monitor for mental status changes/Monitor gait and stability/Orthostatic vital signs/Provide patient with walking aids - walker, cane, crutches/Reinforce activity limits and safety measures with patient and family/Reorient to person, place and time as needed/Sit up slowly, dangle for a short time, stand at bedside before walking/Tailored Fall Risk Interventions/Toileting schedule using arm’s reach rule for commode and bathroom/Use of alarms - bed, chair and/or voice tab/Visual Cue: Yellow wristband and red socks/Bed in lowest position, wheels locked, appropriate side rails in place/Call bell, personal items and telephone in reach/Instruct patient to call for assistance before getting out of bed or chair/Non-slip footwear when patient is out of bed/[place] to call system/Physically safe environment - no spills, clutter or unnecessary equipment/Purposeful Proactive Rounding/Room/bathroom lighting operational, light cord in reach Assistance with ambulation/Assistance OOB with selected safe patient handling equipment/Communicate Risk of Fall with Harm to all staff/Discuss with provider need for PT consult/Monitor for mental status changes/Monitor gait and stability/Orthostatic vital signs/Provide patient with walking aids - walker, cane, crutches/Reinforce activity limits and safety measures with patient and family/Reorient to person, place and time as needed/Sit up slowly, dangle for a short time, stand at bedside before walking/Tailored Fall Risk Interventions/Toileting schedule using arm’s reach rule for commode and bathroom/Use of alarms - bed, chair and/or voice tab/Visual Cue: Yellow wristband and red socks/Bed in lowest position, wheels locked, appropriate side rails in place/Call bell, personal items and telephone in reach/Instruct patient to call for assistance before getting out of bed or chair/Non-slip footwear when patient is out of bed/[place] to call system/Physically safe environment - no spills, clutter or unnecessary equipment/Purposeful Proactive Rounding/Room/bathroom lighting operational, light cord in reach

## 2022-09-04 NOTE — ED ADULT NURSE REASSESSMENT NOTE - NS ED NURSE REASSESS COMMENT FT1
Pt received from [facility]. Pt resting comfortably in bed. No s/s of acute distress. Pending transfer to [facility]. Will continue to monitor Pt received from [facility]. Pt resting comfortably in bed. No s/s of acute distress. Pending transfer to [facility]. Will continue to monitor Pt received from [facility]. Pt resting comfortably in bed. No s/s of acute distress. Pending transfer to [facility]. Will continue to monitor

## 2022-09-04 NOTE — H&P ADULT - NSHPPHYSICALEXAM_GEN_ALL_CORE
T(C): 36.6 (09-04-22 @ 09:41), Max: 36.6 (09-04-22 @ 00:15)  HR: 90 (09-04-22 @ 10:21) (68 - 90)  BP: 93/60 (09-04-22 @ 10:21) (80/46 - 110/77)  RR: 17 (09-04-22 @ 09:41) (16 - 18)  SpO2: 98% (09-04-22 @ 09:41) (97% - 100%)    CONSTITUTIONAL: Lying in bed, in NAD  HEENT: NC/AT, PERRLA, EOMI, OP clear, MMM, neck supple  RESP: No respiratory distress, no use of accessory muscles; CTA b/l, no rales, ronchi, or wheezing   CV: RRR, +S1S2, no MRG; no peripheral edema  GI: Distended with shifting fluid wave. Mild tenderness to palpation in RUQ. Otherwise soft, no rebound or guarding, no palpable masses or hernia.  MSK: Normal ROM without pain, normal muscle strength/tone  NEURO: CN II-XII intact, no focal neurologic deficits    PSYCH: A&Ox3, normal mood and affect

## 2022-09-04 NOTE — H&P ADULT - PROBLEM SELECTOR PLAN 3
Pt has significant history of alcohol abuse, and was drinking 24 beers and 1 pint of hard liquor a day for many years at his peak. He has been sober for 7 months. CIWA 0, no concern for acute alcohol withdrawal. Pt takes acamprosate 333 mg TID for alcohol use disorder.  - Restart acamprosate on discharge Pt has elevated Cr on admission with unknown baseline. Per pt, he has no history of kidney disease. LINNETTE possibly in setting of hepatorenal syndrome and/or dehydration. Currently s/p 2L IV fluids and 100 cc albumin.   - f/u repeat BMP after IV fluids   - f/u Nephrology recs  - Renally dose medications  - Avoid nephrotoxic agents Pt has elevated Cr on admission with unknown baseline. Per pt, he has no history of kidney disease. LINNETTE possibly in setting of hepatorenal syndrome and/or dehydration. Currently s/p 2L IV fluids and 100 cc albumin. Good UOP.  - f/u repeat BMP after IV fluids   - f/u Nephrology recs  - Renally dose medications  - Avoid nephrotoxic agents  - Monitor I/Os Pt has elevated Cr on admission with unknown baseline. Per pt, he has no history of kidney disease. Renal failure possibly in setting of hepatorenal syndrome and/or dehydration. Currently s/p 2L IV fluids and 100 cc albumin. Good UOP.  - f/u repeat BMP after IV fluids   - f/u Nephrology recs  - Renally dose medications  - Avoid nephrotoxic agents  - Monitor I/Os Pt has elevated Cr on admission with unknown baseline. Per pt, he has no history of kidney disease. Renal failure possibly in setting of ATN vs. hepatorenal syndrome and/or dehydration as patient reports decreased PO intake recently. Currently s/p 2L IV fluids and 100 cc albumin. Good UOP.  - f/u repeat BMP after IV fluids   - f/u Nephrology recs  - Renally dose medications  - Avoid nephrotoxic agents  - Monitor I/Os Pt has elevated Cr on admission with unknown baseline. Per pt, he has no history of kidney disease. Renal failure possibly in setting of ATN vs. hepatorenal syndrome and/or dehydration as patient reports decreased PO intake recently. Currently s/p 2L IV fluids and 100 cc albumin. Good UOP.  - f/u repeat BMP after IV fluids   - f/u Nephrology recs  - Renally dose medications  - Avoid nephrotoxic agents such as NSAIDs  - held lasix and spironolactone i/s/o LINNETTE  - Monitor I/Os

## 2022-09-04 NOTE — ED PROVIDER NOTE - PROGRESS NOTE DETAILS
discussed results with patient. he notes no hx of renal insuff or failure. will admit patient for aleisha vs ckd. hydrate gently admit, pending ua. Repeat chemistry with improved K. Call placed to regional medicine pending callback from hospitalist Per central transfer center attending passing on signout to next team to call after 7 AM accepted to [facility] medicine, dr pierce. accepted to [facility] medicine, dr pierce. accepted to [facility] medicine, dr pierce.

## 2022-09-04 NOTE — CONSULT NOTE ADULT - SUBJECTIVE AND OBJECTIVE BOX
GASTROENTEROLOGY CONSULT NOTE  HPI:  50 yo M, PMH of EtOH use Disorder in remission, Cirrhosis (presumed EtOH) requiring serial paracentesis, active smoker, former obesity s/p bariatric surgery, DM, TENZIN, asthma, PUD, chronic back pain,  pw generalized weakness, fatigue, decreased appetite, and generalized abdominal discomfort.     Pt reports having all his  [facility] ([place]), but decided to seek care in [facility] due to lack of satisfaction with [facility] care  reportedly has run out of his meds over the past 2 months and has not had a paracentesis in several months.   He denies fever, chills, headache, dizziness, lightheadedness, vision changes, chest pain, palpitations, SOB, cough, n/v, diarrhea, constipation, melena, hematochezia, dysuria, hematuria, lower extremity edema, numbness, or tingling.    He reports having a difficult life, caring for his mother before she passed, recent passing of his aunt, and needing to care for his father soon (in [place])  He reports he feels dehydrated, and that anything he takes in passes right through him (urine)    He does report using marijuana daily to help with sleep, but reports being sober from etoh for 7 months  He reports he was told about 7 monthd ago that his liver was in bad shape and "even 4oz" would necessitate him to have a liver transplant    Patient reports he has all of his records in his shelter currently, but has "had every test" at [facility]    GI was consulted for history of cirrhosis and elevated Cr    Allergies    No Known Allergies    Intolerances      Home Medications:  acamprosate 333 mg oral delayed release tablet: 1 tab(s) orally 3 times a day (04 Sep 2022 13:11)  gabapentin 300 mg oral capsule: 1 cap(s) orally 3 times a day (04 Sep 2022 12:54)  Lasix 40 mg oral tablet: 1 tab(s) orally once a day (04 Sep 2022 12:54)  lisinopril-hydrochlorothiazide 20 mg-25 mg oral tablet: 1 tab(s) orally once a day (04 Sep 2022 12:54)  oxyCODONE 10 mg oral tablet: 1 tab(s) orally 3 times a day, As Needed (04 Sep 2022 12:54)  spironolactone 100 mg oral tablet: 1 tab(s) orally once a day (04 Sep 2022 12:54)  sucralfate 1 g oral tablet: 1 tab(s) orally 4 times a day (before meals and at bedtime) (04 Sep 2022 12:54)    MEDICATIONS:  MEDICATIONS  (STANDING):  dextrose 5%. 1000 milliLiter(s) (50 mL/Hr) IV Continuous <Continuous>  dextrose 5%. 1000 milliLiter(s) (100 mL/Hr) IV Continuous <Continuous>  dextrose 50% Injectable 25 Gram(s) IV Push once  dextrose 50% Injectable 12.5 Gram(s) IV Push once  dextrose 50% Injectable 25 Gram(s) IV Push once  furosemide    Tablet 40 milliGRAM(s) Oral every 24 hours  gabapentin 300 milliGRAM(s) Oral three times a day  glucagon  Injectable 1 milliGRAM(s) IntraMuscular once  influenza   Vaccine 0.5 milliLiter(s) IntraMuscular once  insulin lispro (ADMELOG) corrective regimen sliding scale   SubCutaneous Before meals and at bedtime  nicotine -  14 mG/24Hr(s) Patch 1 Patch Transdermal daily  pantoprazole    Tablet 40 milliGRAM(s) Oral before breakfast  spironolactone 100 milliGRAM(s) Oral every 24 hours  sucralfate 1 Gram(s) Oral four times a day    MEDICATIONS  (PRN):  acetaminophen     Tablet .. 650 milliGRAM(s) Oral every 6 hours PRN Temp greater or equal to 38C (100.4F), Mild Pain (1 - 3)  albuterol/ipratropium for Nebulization 3 milliLiter(s) Nebulizer every 6 hours PRN Shortness of Breath and/or Wheezing  dextrose Oral Gel 15 Gram(s) Oral once PRN Blood Glucose LESS THAN 70 milliGRAM(s)/deciliter    PAST MEDICAL & SURGICAL HISTORY:  Diabetes mellitus  Alcohol abuse  Smoking  TENZIN (obstructive sleep apnea)  Asthma  Peptic ulcer disease  Cirrhosis  H/O gastric bypass        FAMILY HISTORY:  FH: kidney disease    FH: diabetes mellitus      SOCIAL HISTORY:  Tobacco: current few per day  Alcohol: in remission  Illicit Drugs: mj daily    REVIEW OF SYSTEMS:  All other 10 review of systems is negative unless indicated above.    Vital Signs Last 24 Hrs  T(C): 36.9 (04 Sep 2022 15:24), Max: 36.9 (04 Sep 2022 15:24)  T(F): 98.5 (04 Sep 2022 15:24), Max: 98.5 (04 Sep 2022 15:24)  HR: 83 (04 Sep 2022 15:24) (68 - 90)  BP: 112/59 (04 Sep 2022 15:24) (80/46 - 112/59)  BP(mean): --  RR: 18 (04 Sep 2022 15:24) (16 - 18)  SpO2: 98% (04 Sep 2022 15:24) (97% - 100%)    Parameters below as of 04 Sep 2022 15:24  Patient On (Oxygen Delivery Method): room air        09-04 @ 07:01  -  09-04 @ 17:32  --------------------------------------------------------  IN: 2100 mL / OUT: 600 mL / NET: 1500 mL        PHYSICAL EXAM:    General: lying in bed, in no acute distress, verbose  HEENT: Neck supple, mmm, no jvd, poor dentition  Lungs: Normal respiratory effort, no intercostal retractions  Cardiovascular: regular rate  Abdomen: Soft, non-tender non-distended; No rebound or guarding  Extremities: wwp, no cce  Neurological: HARRINGTON, speech fluent, no asterixis  Skin: Warm and dry. No obvious rash    LABS:                        8.7    5.41  )-----------( 126      ( 04 Sep 2022 14:53 )             27.3     09-04    137  |  105  |  86<H>  ----------------------------<  143<H>  6.1<H>   |  25  |  2.33<H>    Ca    9.5      04 Sep 2022 14:53  Phos  3.6     09-04  Mg     2.3     09-04    TPro  7.3  /  Alb  3.5  /  TBili  0.5  /  DBili  x   /  AST  19  /  ALT  10  /  AlkPhos  88  09-04        PT/INR - ( 04 Sep 2022 14:53 )   PT: 15.1 sec;   INR: 1.27          PTT - ( 04 Sep 2022 14:53 )  PTT:35.3 sec    RADIOLOGY & ADDITIONAL STUDIES:     Reviewed   GASTROENTEROLOGY CONSULT NOTE  HPI:  52 yo M, PMH of EtOH use Disorder in remission, Cirrhosis (presumed EtOH) requiring serial paracentesis, active smoker, former obesity s/p bariatric surgery, DM, TENZIN, asthma, PUD, chronic back pain,  pw generalized weakness, fatigue, decreased appetite, and generalized abdominal discomfort.     Pt reports having all his  [facility] ([place]), but decided to seek care in [facility] due to lack of satisfaction with [place] care  reportedly has run out of his meds over the past 2 months and has not had a paracentesis in several months.   He denies fever, chills, headache, dizziness, lightheadedness, vision changes, chest pain, palpitations, SOB, cough, n/v, diarrhea, constipation, melena, hematochezia, dysuria, hematuria, lower extremity edema, numbness, or tingling.    He reports having a difficult life, caring for his mother before she passed, recent passing of his aunt, and needing to care for his father soon (in [place])  He reports he feels dehydrated, and that anything he takes in passes right through him (urine)    He does report using marijuana daily to help with sleep, but reports being sober from etoh for 7 months  He reports he was told about 7 monthd ago that his liver was in bad shape and "even 4oz" would necessitate him to have a liver transplant    Patient reports he has all of his records in his shelter currently, but has "had every test" at [facility]    GI was consulted for history of cirrhosis and elevated Cr    Allergies    No Known Allergies    Intolerances      Home Medications:  acamprosate 333 mg oral delayed release tablet: 1 tab(s) orally 3 times a day (04 Sep 2022 13:11)  gabapentin 300 mg oral capsule: 1 cap(s) orally 3 times a day (04 Sep 2022 12:54)  Lasix 40 mg oral tablet: 1 tab(s) orally once a day (04 Sep 2022 12:54)  lisinopril-hydrochlorothiazide 20 mg-25 mg oral tablet: 1 tab(s) orally once a day (04 Sep 2022 12:54)  oxyCODONE 10 mg oral tablet: 1 tab(s) orally 3 times a day, As Needed (04 Sep 2022 12:54)  spironolactone 100 mg oral tablet: 1 tab(s) orally once a day (04 Sep 2022 12:54)  sucralfate 1 g oral tablet: 1 tab(s) orally 4 times a day (before meals and at bedtime) (04 Sep 2022 12:54)    MEDICATIONS:  MEDICATIONS  (STANDING):  dextrose 5%. 1000 milliLiter(s) (50 mL/Hr) IV Continuous <Continuous>  dextrose 5%. 1000 milliLiter(s) (100 mL/Hr) IV Continuous <Continuous>  dextrose 50% Injectable 25 Gram(s) IV Push once  dextrose 50% Injectable 12.5 Gram(s) IV Push once  dextrose 50% Injectable 25 Gram(s) IV Push once  furosemide    Tablet 40 milliGRAM(s) Oral every 24 hours  gabapentin 300 milliGRAM(s) Oral three times a day  glucagon  Injectable 1 milliGRAM(s) IntraMuscular once  influenza   Vaccine 0.5 milliLiter(s) IntraMuscular once  insulin lispro (ADMELOG) corrective regimen sliding scale   SubCutaneous Before meals and at bedtime  nicotine -  14 mG/24Hr(s) Patch 1 Patch Transdermal daily  pantoprazole    Tablet 40 milliGRAM(s) Oral before breakfast  spironolactone 100 milliGRAM(s) Oral every 24 hours  sucralfate 1 Gram(s) Oral four times a day    MEDICATIONS  (PRN):  acetaminophen     Tablet .. 650 milliGRAM(s) Oral every 6 hours PRN Temp greater or equal to 38C (100.4F), Mild Pain (1 - 3)  albuterol/ipratropium for Nebulization 3 milliLiter(s) Nebulizer every 6 hours PRN Shortness of Breath and/or Wheezing  dextrose Oral Gel 15 Gram(s) Oral once PRN Blood Glucose LESS THAN 70 milliGRAM(s)/deciliter    PAST MEDICAL & SURGICAL HISTORY:  Diabetes mellitus  Alcohol abuse  Smoking  TENZIN (obstructive sleep apnea)  Asthma  Peptic ulcer disease  Cirrhosis  H/O gastric bypass        FAMILY HISTORY:  FH: kidney disease    FH: diabetes mellitus      SOCIAL HISTORY:  Tobacco: current few per day  Alcohol: in remission  Illicit Drugs: mj daily    REVIEW OF SYSTEMS:  All other 10 review of systems is negative unless indicated above.    Vital Signs Last 24 Hrs  T(C): 36.9 (04 Sep 2022 15:24), Max: 36.9 (04 Sep 2022 15:24)  T(F): 98.5 (04 Sep 2022 15:24), Max: 98.5 (04 Sep 2022 15:24)  HR: 83 (04 Sep 2022 15:24) (68 - 90)  BP: 112/59 (04 Sep 2022 15:24) (80/46 - 112/59)  BP(mean): --  RR: 18 (04 Sep 2022 15:24) (16 - 18)  SpO2: 98% (04 Sep 2022 15:24) (97% - 100%)    Parameters below as of 04 Sep 2022 15:24  Patient On (Oxygen Delivery Method): room air        09-04 @ 07:01  -  09-04 @ 17:32  --------------------------------------------------------  IN: 2100 mL / OUT: 600 mL / NET: 1500 mL        PHYSICAL EXAM:    General: lying in bed, in no acute distress, verbose  HEENT: Neck supple, mmm, no jvd, poor dentition  Lungs: Normal respiratory effort, no intercostal retractions  Cardiovascular: regular rate  Abdomen: Soft, non-tender non-distended; No rebound or guarding  Extremities: wwp, no cce  Neurological: HARRINGTON, speech fluent, no asterixis  Skin: Warm and dry. No obvious rash    LABS:                        8.7    5.41  )-----------( 126      ( 04 Sep 2022 14:53 )             27.3     09-04    137  |  105  |  86<H>  ----------------------------<  143<H>  6.1<H>   |  25  |  2.33<H>    Ca    9.5      04 Sep 2022 14:53  Phos  3.6     09-04  Mg     2.3     09-04    TPro  7.3  /  Alb  3.5  /  TBili  0.5  /  DBili  x   /  AST  19  /  ALT  10  /  AlkPhos  88  09-04        PT/INR - ( 04 Sep 2022 14:53 )   PT: 15.1 sec;   INR: 1.27          PTT - ( 04 Sep 2022 14:53 )  PTT:35.3 sec    RADIOLOGY & ADDITIONAL STUDIES:     Reviewed   GASTROENTEROLOGY CONSULT NOTE  HPI:  50 yo M, PMH of EtOH use Disorder in remission, Cirrhosis (presumed EtOH) requiring serial paracentesis, active smoker, former obesity s/p bariatric surgery, DM, TENZIN, asthma, PUD, chronic back pain,  pw generalized weakness, fatigue, decreased appetite, and generalized abdominal discomfort.     Pt reports having all his  [facility] ([place]), but decided to seek care in [facility] due to lack of satisfaction with [facility] care  reportedly has run out of his meds over the past 2 months and has not had a paracentesis in several months.   He denies fever, chills, headache, dizziness, lightheadedness, vision changes, chest pain, palpitations, SOB, cough, n/v, diarrhea, constipation, melena, hematochezia, dysuria, hematuria, lower extremity edema, numbness, or tingling.    He reports having a difficult life, caring for his mother before she passed, recent passing of his aunt, and needing to care for his father soon (in [place])  He reports he feels dehydrated, and that anything he takes in passes right through him (urine)    He does report using marijuana daily to help with sleep, but reports being sober from etoh for 7 months  He reports he was told about 7 monthd ago that his liver was in bad shape and "even 4oz" would necessitate him to have a liver transplant    Patient reports he has all of his records in his shelter currently, but has "had every test" at [facility]    GI was consulted for history of cirrhosis and elevated Cr    Allergies    No Known Allergies    Intolerances      Home Medications:  acamprosate 333 mg oral delayed release tablet: 1 tab(s) orally 3 times a day (04 Sep 2022 13:11)  gabapentin 300 mg oral capsule: 1 cap(s) orally 3 times a day (04 Sep 2022 12:54)  Lasix 40 mg oral tablet: 1 tab(s) orally once a day (04 Sep 2022 12:54)  lisinopril-hydrochlorothiazide 20 mg-25 mg oral tablet: 1 tab(s) orally once a day (04 Sep 2022 12:54)  oxyCODONE 10 mg oral tablet: 1 tab(s) orally 3 times a day, As Needed (04 Sep 2022 12:54)  spironolactone 100 mg oral tablet: 1 tab(s) orally once a day (04 Sep 2022 12:54)  sucralfate 1 g oral tablet: 1 tab(s) orally 4 times a day (before meals and at bedtime) (04 Sep 2022 12:54)    MEDICATIONS:  MEDICATIONS  (STANDING):  dextrose 5%. 1000 milliLiter(s) (50 mL/Hr) IV Continuous <Continuous>  dextrose 5%. 1000 milliLiter(s) (100 mL/Hr) IV Continuous <Continuous>  dextrose 50% Injectable 25 Gram(s) IV Push once  dextrose 50% Injectable 12.5 Gram(s) IV Push once  dextrose 50% Injectable 25 Gram(s) IV Push once  furosemide    Tablet 40 milliGRAM(s) Oral every 24 hours  gabapentin 300 milliGRAM(s) Oral three times a day  glucagon  Injectable 1 milliGRAM(s) IntraMuscular once  influenza   Vaccine 0.5 milliLiter(s) IntraMuscular once  insulin lispro (ADMELOG) corrective regimen sliding scale   SubCutaneous Before meals and at bedtime  nicotine -  14 mG/24Hr(s) Patch 1 Patch Transdermal daily  pantoprazole    Tablet 40 milliGRAM(s) Oral before breakfast  spironolactone 100 milliGRAM(s) Oral every 24 hours  sucralfate 1 Gram(s) Oral four times a day    MEDICATIONS  (PRN):  acetaminophen     Tablet .. 650 milliGRAM(s) Oral every 6 hours PRN Temp greater or equal to 38C (100.4F), Mild Pain (1 - 3)  albuterol/ipratropium for Nebulization 3 milliLiter(s) Nebulizer every 6 hours PRN Shortness of Breath and/or Wheezing  dextrose Oral Gel 15 Gram(s) Oral once PRN Blood Glucose LESS THAN 70 milliGRAM(s)/deciliter    PAST MEDICAL & SURGICAL HISTORY:  Diabetes mellitus  Alcohol abuse  Smoking  TENZIN (obstructive sleep apnea)  Asthma  Peptic ulcer disease  Cirrhosis  H/O gastric bypass        FAMILY HISTORY:  FH: kidney disease    FH: diabetes mellitus      SOCIAL HISTORY:  Tobacco: current few per day  Alcohol: in remission  Illicit Drugs: mj daily    REVIEW OF SYSTEMS:  All other 10 review of systems is negative unless indicated above.    Vital Signs Last 24 Hrs  T(C): 36.9 (04 Sep 2022 15:24), Max: 36.9 (04 Sep 2022 15:24)  T(F): 98.5 (04 Sep 2022 15:24), Max: 98.5 (04 Sep 2022 15:24)  HR: 83 (04 Sep 2022 15:24) (68 - 90)  BP: 112/59 (04 Sep 2022 15:24) (80/46 - 112/59)  BP(mean): --  RR: 18 (04 Sep 2022 15:24) (16 - 18)  SpO2: 98% (04 Sep 2022 15:24) (97% - 100%)    Parameters below as of 04 Sep 2022 15:24  Patient On (Oxygen Delivery Method): room air        09-04 @ 07:01  -  09-04 @ 17:32  --------------------------------------------------------  IN: 2100 mL / OUT: 600 mL / NET: 1500 mL        PHYSICAL EXAM:    General: lying in bed, in no acute distress, verbose  HEENT: Neck supple, mmm, no jvd, poor dentition  Lungs: Normal respiratory effort, no intercostal retractions  Cardiovascular: regular rate  Abdomen: Soft, non-tender non-distended; No rebound or guarding  Extremities: wwp, no cce  Neurological: HARRINGTON, speech fluent, no asterixis  Skin: Warm and dry. No obvious rash    LABS:                        8.7    5.41  )-----------( 126      ( 04 Sep 2022 14:53 )             27.3     09-04    137  |  105  |  86<H>  ----------------------------<  143<H>  6.1<H>   |  25  |  2.33<H>    Ca    9.5      04 Sep 2022 14:53  Phos  3.6     09-04  Mg     2.3     09-04    TPro  7.3  /  Alb  3.5  /  TBili  0.5  /  DBili  x   /  AST  19  /  ALT  10  /  AlkPhos  88  09-04        PT/INR - ( 04 Sep 2022 14:53 )   PT: 15.1 sec;   INR: 1.27          PTT - ( 04 Sep 2022 14:53 )  PTT:35.3 sec    RADIOLOGY & ADDITIONAL STUDIES:     Reviewed

## 2022-09-04 NOTE — H&P ADULT - NSHPSOCIALHISTORY_GEN_ALL_CORE
Pt is currently homeless and lives in a shelter on [address] in [place] Pt is currently homeless and lives in a shelter on [address] in [place] Pt is currently homeless and lives in a shelter on [address] in [place]

## 2022-09-05 ENCOUNTER — TRANSCRIPTION ENCOUNTER (OUTPATIENT)
Age: 52
End: 2022-09-05

## 2022-09-05 LAB
A1C WITH ESTIMATED AVERAGE GLUCOSE RESULT: 6 % — HIGH (ref 4–5.6)
ALBUMIN SERPL ELPH-MCNC: 4 G/DL — SIGNIFICANT CHANGE UP (ref 3.3–5)
ALBUMIN SERPL ELPH-MCNC: 4.2 G/DL — SIGNIFICANT CHANGE UP (ref 3.3–5)
ALP SERPL-CCNC: 77 U/L — SIGNIFICANT CHANGE UP (ref 40–120)
ALP SERPL-CCNC: 85 U/L — SIGNIFICANT CHANGE UP (ref 40–120)
ALT FLD-CCNC: 10 U/L — SIGNIFICANT CHANGE UP (ref 10–45)
ALT FLD-CCNC: 11 U/L — SIGNIFICANT CHANGE UP (ref 10–45)
ANION GAP SERPL CALC-SCNC: 10 MMOL/L — SIGNIFICANT CHANGE UP (ref 5–17)
APTT BLD: 34.3 SEC — SIGNIFICANT CHANGE UP (ref 27.5–35.5)
AST SERPL-CCNC: 16 U/L — SIGNIFICANT CHANGE UP (ref 10–40)
AST SERPL-CCNC: 18 U/L — SIGNIFICANT CHANGE UP (ref 10–40)
BASOPHILS # BLD AUTO: 0.04 K/UL — SIGNIFICANT CHANGE UP (ref 0–0.2)
BASOPHILS NFR BLD AUTO: 0.9 % — SIGNIFICANT CHANGE UP (ref 0–2)
BILIRUB SERPL-MCNC: 0.6 MG/DL — SIGNIFICANT CHANGE UP (ref 0.2–1.2)
BLD GP AB SCN SERPL QL: NEGATIVE — SIGNIFICANT CHANGE UP
BUN SERPL-MCNC: 57 MG/DL — HIGH (ref 7–23)
BUN SERPL-MCNC: 70 MG/DL — HIGH (ref 7–23)
CALCIUM SERPL-MCNC: 10 MG/DL — SIGNIFICANT CHANGE UP (ref 8.4–10.5)
CALCIUM SERPL-MCNC: 10.1 MG/DL — SIGNIFICANT CHANGE UP (ref 8.4–10.5)
CHLORIDE SERPL-SCNC: 106 MMOL/L — SIGNIFICANT CHANGE UP (ref 96–108)
CHLORIDE SERPL-SCNC: 107 MMOL/L — SIGNIFICANT CHANGE UP (ref 96–108)
CO2 SERPL-SCNC: 25 MMOL/L — SIGNIFICANT CHANGE UP (ref 22–31)
CREAT ?TM UR-MCNC: 58 MG/DL — SIGNIFICANT CHANGE UP
CREAT SERPL-MCNC: 1.62 MG/DL — HIGH (ref 0.5–1.3)
CREAT SERPL-MCNC: 1.97 MG/DL — HIGH (ref 0.5–1.3)
EGFR: 40 ML/MIN/1.73M2 — LOW
EGFR: 50 ML/MIN/1.73M2 — LOW
EOSINOPHIL # BLD AUTO: 0.22 K/UL — SIGNIFICANT CHANGE UP (ref 0–0.5)
EOSINOPHIL NFR BLD AUTO: 4.8 % — SIGNIFICANT CHANGE UP (ref 0–6)
ESTIMATED AVERAGE GLUCOSE: 126 MG/DL — HIGH (ref 68–114)
GLUCOSE BLDC GLUCOMTR-MCNC: 129 MG/DL — HIGH (ref 70–99)
GLUCOSE BLDC GLUCOMTR-MCNC: 136 MG/DL — HIGH (ref 70–99)
GLUCOSE BLDC GLUCOMTR-MCNC: 155 MG/DL — HIGH (ref 70–99)
GLUCOSE BLDC GLUCOMTR-MCNC: 187 MG/DL — HIGH (ref 70–99)
GLUCOSE BLDC GLUCOMTR-MCNC: 82 MG/DL — SIGNIFICANT CHANGE UP (ref 70–99)
GLUCOSE SERPL-MCNC: 72 MG/DL — SIGNIFICANT CHANGE UP (ref 70–99)
GLUCOSE SERPL-MCNC: 85 MG/DL — SIGNIFICANT CHANGE UP (ref 70–99)
HCT VFR BLD CALC: 29.3 % — LOW (ref 39–50)
HGB BLD-MCNC: 9.1 G/DL — LOW (ref 13–17)
IMM GRANULOCYTES NFR BLD AUTO: 0.2 % — SIGNIFICANT CHANGE UP (ref 0–1.5)
INR BLD: 1.3 — HIGH (ref 0.88–1.16)
LYMPHOCYTES # BLD AUTO: 1.41 K/UL — SIGNIFICANT CHANGE UP (ref 1–3.3)
LYMPHOCYTES # BLD AUTO: 30.7 % — SIGNIFICANT CHANGE UP (ref 13–44)
MAGNESIUM SERPL-MCNC: 2 MG/DL — SIGNIFICANT CHANGE UP (ref 1.6–2.6)
MCHC RBC-ENTMCNC: 27.6 PG — SIGNIFICANT CHANGE UP (ref 27–34)
MCHC RBC-ENTMCNC: 31.1 GM/DL — LOW (ref 32–36)
MCV RBC AUTO: 88.8 FL — SIGNIFICANT CHANGE UP (ref 80–100)
MONOCYTES # BLD AUTO: 0.44 K/UL — SIGNIFICANT CHANGE UP (ref 0–0.9)
MONOCYTES NFR BLD AUTO: 9.6 % — SIGNIFICANT CHANGE UP (ref 2–14)
NEUTROPHILS # BLD AUTO: 2.48 K/UL — SIGNIFICANT CHANGE UP (ref 1.8–7.4)
NEUTROPHILS NFR BLD AUTO: 53.8 % — SIGNIFICANT CHANGE UP (ref 43–77)
NRBC # BLD: 0 /100 WBCS — SIGNIFICANT CHANGE UP (ref 0–0)
PHOSPHATE SERPL-MCNC: 3.4 MG/DL — SIGNIFICANT CHANGE UP (ref 2.5–4.5)
PLATELET # BLD AUTO: 113 K/UL — LOW (ref 150–400)
POTASSIUM SERPL-MCNC: 5.1 MMOL/L — SIGNIFICANT CHANGE UP (ref 3.5–5.3)
POTASSIUM SERPL-MCNC: 5.4 MMOL/L — HIGH (ref 3.5–5.3)
POTASSIUM SERPL-SCNC: 5.1 MMOL/L — SIGNIFICANT CHANGE UP (ref 3.5–5.3)
POTASSIUM SERPL-SCNC: 5.4 MMOL/L — HIGH (ref 3.5–5.3)
PROT SERPL-MCNC: 7.6 G/DL — SIGNIFICANT CHANGE UP (ref 6–8.3)
PROT SERPL-MCNC: 7.8 G/DL — SIGNIFICANT CHANGE UP (ref 6–8.3)
PROTHROM AB SERPL-ACNC: 15.5 SEC — HIGH (ref 10.5–13.4)
RBC # BLD: 3.3 M/UL — LOW (ref 4.2–5.8)
RBC # FLD: 15.6 % — HIGH (ref 10.3–14.5)
RH IG SCN BLD-IMP: POSITIVE — SIGNIFICANT CHANGE UP
SODIUM SERPL-SCNC: 141 MMOL/L — SIGNIFICANT CHANGE UP (ref 135–145)
SODIUM SERPL-SCNC: 142 MMOL/L — SIGNIFICANT CHANGE UP (ref 135–145)
SODIUM UR-SCNC: 54 MMOL/L — SIGNIFICANT CHANGE UP
UUN UR-MCNC: 739 MG/DL — SIGNIFICANT CHANGE UP
WBC # BLD: 4.6 K/UL — SIGNIFICANT CHANGE UP (ref 3.8–10.5)
WBC # FLD AUTO: 4.6 K/UL — SIGNIFICANT CHANGE UP (ref 3.8–10.5)

## 2022-09-05 PROCEDURE — 99232 SBSQ HOSP IP/OBS MODERATE 35: CPT | Mod: GC

## 2022-09-05 PROCEDURE — 99233 SBSQ HOSP IP/OBS HIGH 50: CPT

## 2022-09-05 PROCEDURE — 99233 SBSQ HOSP IP/OBS HIGH 50: CPT | Mod: GC

## 2022-09-05 PROCEDURE — 93975 VASCULAR STUDY: CPT | Mod: 26

## 2022-09-05 RX ORDER — DEXTROSE 50 % IN WATER 50 %
50 SYRINGE (ML) INTRAVENOUS ONCE
Refills: 0 | Status: COMPLETED | OUTPATIENT
Start: 2022-09-05 | End: 2022-09-05

## 2022-09-05 RX ORDER — SODIUM ZIRCONIUM CYCLOSILICATE 10 G/10G
10 POWDER, FOR SUSPENSION ORAL ONCE
Refills: 0 | Status: COMPLETED | OUTPATIENT
Start: 2022-09-05 | End: 2022-09-05

## 2022-09-05 RX ORDER — OXYCODONE HYDROCHLORIDE 5 MG/1
10 TABLET ORAL ONCE
Refills: 0 | Status: DISCONTINUED | OUTPATIENT
Start: 2022-09-05 | End: 2022-09-05

## 2022-09-05 RX ORDER — INSULIN HUMAN 100 [IU]/ML
10 INJECTION, SOLUTION SUBCUTANEOUS ONCE
Refills: 0 | Status: COMPLETED | OUTPATIENT
Start: 2022-09-05 | End: 2022-09-05

## 2022-09-05 RX ORDER — SODIUM ZIRCONIUM CYCLOSILICATE 10 G/10G
10 POWDER, FOR SUSPENSION ORAL EVERY 8 HOURS
Refills: 0 | Status: COMPLETED | OUTPATIENT
Start: 2022-09-05 | End: 2022-09-05

## 2022-09-05 RX ORDER — LANOLIN ALCOHOL/MO/W.PET/CERES
5 CREAM (GRAM) TOPICAL ONCE
Refills: 0 | Status: COMPLETED | OUTPATIENT
Start: 2022-09-05 | End: 2022-09-05

## 2022-09-05 RX ORDER — CALCIUM GLUCONATE 100 MG/ML
1 VIAL (ML) INTRAVENOUS ONCE
Refills: 0 | Status: COMPLETED | OUTPATIENT
Start: 2022-09-05 | End: 2022-09-05

## 2022-09-05 RX ADMIN — Medication 100 GRAM(S): at 01:14

## 2022-09-05 RX ADMIN — Medication 1 PATCH: at 16:27

## 2022-09-05 RX ADMIN — Medication 1 GRAM(S): at 13:37

## 2022-09-05 RX ADMIN — Medication 2: at 22:46

## 2022-09-05 RX ADMIN — SODIUM ZIRCONIUM CYCLOSILICATE 10 GRAM(S): 10 POWDER, FOR SUSPENSION ORAL at 06:23

## 2022-09-05 RX ADMIN — Medication 650 MILLIGRAM(S): at 14:50

## 2022-09-05 RX ADMIN — Medication 1 GRAM(S): at 23:43

## 2022-09-05 RX ADMIN — Medication 1 PATCH: at 13:36

## 2022-09-05 RX ADMIN — Medication 50 MILLILITER(S): at 01:22

## 2022-09-05 RX ADMIN — Medication 50 MILLILITER(S): at 22:47

## 2022-09-05 RX ADMIN — SODIUM ZIRCONIUM CYCLOSILICATE 10 GRAM(S): 10 POWDER, FOR SUSPENSION ORAL at 16:21

## 2022-09-05 RX ADMIN — GABAPENTIN 300 MILLIGRAM(S): 400 CAPSULE ORAL at 13:37

## 2022-09-05 RX ADMIN — Medication 650 MILLIGRAM(S): at 13:37

## 2022-09-05 RX ADMIN — Medication 1 GRAM(S): at 06:23

## 2022-09-05 RX ADMIN — SODIUM ZIRCONIUM CYCLOSILICATE 10 GRAM(S): 10 POWDER, FOR SUSPENSION ORAL at 01:22

## 2022-09-05 RX ADMIN — Medication 1 GRAM(S): at 17:31

## 2022-09-05 RX ADMIN — Medication 5 MILLIGRAM(S): at 22:46

## 2022-09-05 RX ADMIN — INSULIN HUMAN 10 UNIT(S): 100 INJECTION, SOLUTION SUBCUTANEOUS at 01:32

## 2022-09-05 RX ADMIN — Medication 1 PATCH: at 06:19

## 2022-09-05 RX ADMIN — Medication 2: at 12:36

## 2022-09-05 RX ADMIN — GABAPENTIN 300 MILLIGRAM(S): 400 CAPSULE ORAL at 22:47

## 2022-09-05 RX ADMIN — PANTOPRAZOLE SODIUM 40 MILLIGRAM(S): 20 TABLET, DELAYED RELEASE ORAL at 06:22

## 2022-09-05 RX ADMIN — Medication 1 PATCH: at 08:07

## 2022-09-05 RX ADMIN — OXYCODONE HYDROCHLORIDE 10 MILLIGRAM(S): 5 TABLET ORAL at 22:47

## 2022-09-05 RX ADMIN — OXYCODONE HYDROCHLORIDE 10 MILLIGRAM(S): 5 TABLET ORAL at 23:15

## 2022-09-05 RX ADMIN — Medication 100 MILLILITER(S): at 06:55

## 2022-09-05 RX ADMIN — Medication 100 MILLILITER(S): at 13:37

## 2022-09-05 RX ADMIN — GABAPENTIN 300 MILLIGRAM(S): 400 CAPSULE ORAL at 06:22

## 2022-09-05 NOTE — DISCHARGE NOTE PROVIDER - NSDCMRMEDTOKEN_GEN_ALL_CORE_FT
acamprosate 333 mg oral delayed release tablet: 1 tab(s) orally 3 times a day  gabapentin 300 mg oral capsule: 1 cap(s) orally 3 times a day  Lasix 40 mg oral tablet: 1 tab(s) orally once a day  lisinopril-hydrochlorothiazide 20 mg-25 mg oral tablet: 1 tab(s) orally once a day  oxyCODONE 10 mg oral tablet: 1 tab(s) orally 3 times a day, As Needed  spironolactone 100 mg oral tablet: 1 tab(s) orally once a day  sucralfate 1 g oral tablet: 1 tab(s) orally 4 times a day (before meals and at bedtime)   acamprosate 333 mg oral delayed release tablet: 1 tab(s) orally 3 times a day  gabapentin 300 mg oral capsule: 1 cap(s) orally 3 times a day  lactulose 10 g/15 mL oral syrup: 30 milliliter(s) orally every 8 hours  Lasix 40 mg oral tablet: 1 tab(s) orally once a day  oxyCODONE 10 mg oral tablet: 1 tab(s) orally 3 times a day, As Needed  sucralfate 1 g oral tablet: 1 tab(s) orally 4 times a day (before meals and at bedtime)

## 2022-09-05 NOTE — DISCHARGE NOTE PROVIDER - PROVIDER TOKENS
FREE:[LAST:[Marshall],FIRST:[Rima],PHONE:[phone]:[(   )    -],ADDRESS:[[address] [place] [address]],SCHEDULEDAPPT:[09/13/2022],SCHEDULEDAPPTTIME:[10:20 AM]] FREE:[LAST:[[place]],FIRST:[Rima],PHONE:[phone]:[(   )    -],ADDRESS:[[address] [place] [address]],SCHEDULEDAPPT:[09/13/2022],SCHEDULEDAPPTTIME:[10:20 AM]] FREE:[LAST:[M[place]],FIRST:[Rima],PHONE:[phone]:[(   )    -],ADDRESS:[[address] [place] [address]],SCHEDULEDAPPT:[09/13/2022],SCHEDULEDAPPTTIME:[10:20 AM]]

## 2022-09-05 NOTE — PROGRESS NOTE ADULT - PROBLEM SELECTOR PLAN 12
Pt is currently homeless and living in a shelter. He typically follows at [facility] but has been unable to obtain medical care or medication refills due to insurance issues   -  consult    - DVT ppx: SCD's (Improve score 0)   - GI ppx: Protonix   - Fluids: s/p 2.5 L NS, 100 albumin  - Electrolytes: replete as needed   - Diet: Dash/CC Pt is currently homeless and living in a shelter. He typically follows at [facility] but has been unable to obtain medical care or medication refills due to insurance issues   -  consult    - DVT ppx: SCD's (Improve score 0)   - GI ppx: Protonix   - Fluids: s/p 2.5 L NS, 100 albumin  - Electrolytes: replete as needed   - Diet: Dash/CC Pt is currently homeless and living in a shelter. He typically follows at [facility] but has been unable to obtain medical care or medication refills due to insurance issues   -  consult    - DVT ppx: SCD's (Improve score 0)   - GI ppx: Protonix   - Fluids: s/p 2.5 L NS, 100 albumin  - Electrolytes: replete as needed   - Diet: Dash/CC

## 2022-09-05 NOTE — DISCHARGE NOTE PROVIDER - NSDCCPCAREPLAN_GEN_ALL_CORE_FT
PRINCIPAL DISCHARGE DIAGNOSIS  Diagnosis: LINNETTE (acute kidney injury)  Assessment and Plan of Treatment: Acute kidney injury (LINNETTE) is where your kidneys suddenly stop working properly. It can range from minor loss of kidney function to complete kidney failure.  LINNETTE normally happens as a complication of another serious illness. It's not the result of a physical blow to the kidneys, as the name might suggest.  This type of kidney damage is usually seen in older people who are unwell with other conditions and the kidneys are also affected.  Most cases of LINNETTE are caused by reduced blood flow to the kidneys, usually in someone who's already unwell with another health condition.  This reduced blood flow could be caused by:  low blood volume after bleeding, excessive vomiting or diarrhoea, or severe dehydration  the heart pumping out less blood than normal as a result of heart failure, liver failure or sepsis  problems with the blood vessels – such as inflammation and blockage in the blood vessels within the kidneys (a rare condition called vasculitis)  certain medicines that can affect the blood supply to the kidney – other medicines may cause unusual reactions in the kidney itself  LINNETTE can also be caused by a problem with the kidney itself, such as glomerulonephritis.  This may be caused by a reaction to some drugs, infections or the liquid dye used in some types of X-rays.  Treatment of LINNETTE depends on what's causing your illness and how severe it is.  You may need:  to increase your intake of water and other fluids if you're dehydrated  antibiotics if you have an infection  to stop taking certain medicines (at least until the problem is sorted)  a urinary catheter, a thin tube used to drain the bladder if there's a blockage  You may need to go to hospital for some treatments.

## 2022-09-05 NOTE — PROGRESS NOTE ADULT - PROBLEM SELECTOR PLAN 10
Pt reports a history of morbid obesity (improved s/p gastric bypass) and diabetes (previously took metformin and insulin). He does not currently take any medications for his diabetes.  - f/u A1c  - [facility] inpatient Pt reports a history of morbid obesity (improved s/p gastric bypass) and diabetes (previously took metformin and insulin). He does not currently take any medications for his diabetes.  - f/u A1c  - [facility] inpatient

## 2022-09-05 NOTE — PROGRESS NOTE ADULT - ASSESSMENT
Patient is a 51y yo Male with PMH of ex-alcoholism (sober for 7 months), cirrhosis, smoking (35-40 pack year history), DM, TENZIN (no longer on CPAP due to insurance issues), asthma, PUD, abdominal hernia (per patient), chronic back pain, Hx of gastric bypass, and Hx of multiple paracentesis to abdomen who presents to the ED with generalized weakness, fatigue, decreased appetite, and generalized abdominal discomfort. Pt usually follows at [facility] where he gets his medication refills and a paracentesis every few months. Recently he has been having insurance issues which have prevented him from following up at [facility] or refilling his medications; he has run out of his meds over the past 2 months and has not had a paracentesis in several months. During transportation from [facility] to [facility], pt became hypotensive with BP 80/46. He received 1L NS bolus in the ambulance which did not improve his BP. On arrival to [facility], pt was mentating well, speaking in full sentences, and did not report any additional symptoms. Nephrology consulted for LINNETTE.             #LINNETTE on CKD? patient unaware of his baseline Cr   likely etiology  hypoperfusional secondary  volume depletion and component of iATN given low BP and cocaine use   No response to crystalloid fluid   Admission Cr 3.41 now at 1.97    Recommend  Trend Daily BMP with magnesium and phos   Obtain documents from [facility]   Monitor Urine output   Strict IN and outs   Urine analysis   continue to hold Diuretics   continue with albumin challenge    Avoid NSAIDS   Renally dose medications  Patient is a 51y yo Male with PMH of ex-alcoholism (sober for 7 months), cirrhosis, smoking (35-40 pack year history), DM, TENZIN (no longer on CPAP due to insurance issues), asthma, PUD, abdominal hernia (per patient), chronic back pain, Hx of gastric bypass, and Hx of multiple paracentesis to abdomen who presents to the ED with generalized weakness, fatigue, decreased appetite, and generalized abdominal discomfort. Pt usually follows at [facility] where he gets his medication refills and a paracentesis every few months. Recently he has been having insurance issues which have prevented him from following up at [facility] or refilling his medications; he has run out of his meds over the past 2 months and has not had a paracentesis in several months. During transportation from [facility] to [facility], pt became hypotensive with BP 80/46. He received 1L NS bolus in the ambulance which did not improve his BP. On arrival to [facility], pt was mentating well, speaking in full sentences, and did not report any additional symptoms. Nephrology consulted for LINNETTE.             #LINNETTE on CKD? patient unaware of his baseline Cr   likely etiology  hypoperfusional secondary  volume depletion and component of iATN given low BP and cocaine use   No response to crystalloid fluid   Admission Cr 3.41 now at 1.97    Recommend  Trend Daily BMP with magnesium and phos   Obtain documents from [facility]   Monitor Urine output   Strict IN and outs   Urine analysis   continue to hold Diuretics   continue with albumin challenge    Avoid NSAIDS   Renally dose medications  Patient is a 51y yo Male with PMH of ex-alcoholism (sober for 7 months), cirrhosis, smoking (35-40 pack year history), DM, TENZIN (no longer on CPAP due to insurance issues), asthma, PUD, abdominal hernia (per patient), chronic back pain, Hx of gastric bypass, and Hx of multiple paracentesis to abdomen who presents to the ED with generalized weakness, fatigue, decreased appetite, and generalized abdominal discomfort. Pt usually follows at [facility] where he gets his medication refills and a paracentesis every few months. Recently he has been having insurance issues which have prevented him from following up at [facility] or refilling his medications; he has run out of his meds over the past 2 months and has not had a paracentesis in several months. During transportation from [facility] to [facility], pt became hypotensive with BP 80/46. He received 1L NS bolus in the ambulance which did not improve his BP. On arrival to [facility], pt was mentating well, speaking in full sentences, and did not report any additional symptoms. Nephrology consulted for LINNETTE.             #LINNETTE on CKD? patient unaware of his baseline Cr   likely etiology  hypoperfusional secondary  volume depletion and component of iATN given low BP and cocaine use   No response to crystalloid fluid   Admission Cr 3.41 now at 1.97    Recommend  Trend Daily BMP with magnesium and phos   Obtain documents from [facility]   Monitor Urine output   Strict IN and outs   Urine analysis   continue to hold Diuretics   continue with albumin challenge    Avoid NSAIDS   Renally dose medications

## 2022-09-05 NOTE — DISCHARGE NOTE PROVIDER - NSDCFUADDAPPT_GEN_ALL_CORE_FT
Please see the primary care provider at the above time and location and ask for a referral to hepatology if necessary.

## 2022-09-05 NOTE — DISCHARGE NOTE PROVIDER - HOSPITAL COURSE
51y yo Male with PMH of ex-alcoholism (sober for 7 months), cirrhosis, smoking (35-40 pack year history), DM, TENZIN (no longer on CPAP due to insurance issues), asthma, PUD, abdominal hernia (per patient), chronic back pain, Hx of gastric bypass, and Hx of multiple paracentesis to abdomen who presents to the ED with generalized weakness, fatigue, decreased appetite, and generalized abdominal discomfort. Pt usually follows at [facility] where he gets his medication refills and a paracentesis every few months. Recently he has been having insurance issues which have prevented him from following up at [facility] or refilling his medications; he has run out of his meds over the past 2 months and has not had a paracentesis in several months. He denies fever, chills, headache, dizziness, lightheadedness, vision changes, chest pain, palpitations, SOB, cough, n/v, diarrhea, constipation, melena, hematochezia, dysuria, hematuria, urinary frequency/urgency, lower extremity edema, numbness, or tingling. En route from [facility] to [facility], pt hypotensive, 80/46, remained hypotensive despite 1L NS boli. Asymptomatic, mentating well, no additional symptoms. Afebrile. Labs initially signficant for LINNETTE, Cr 3.54, UA positive of trace leuks, many WBC, many bacteria. Utox positive for marijuana, cocaine. Patient denied any c/p, palps, urinary sx. CXR wnl, EKG NS. CT abdomen consistent with hepatic cirrhosis and small ascites, splenomegaly. Hyperkalemic, patient given lokelma 10g x1. Patient w elevated potassium 8/4 PM as well, given lokelma, calcium, and insulin. K responded from 6.3->5.1 in 9/5 AM. GI and Nephro followed patient, considering hepatorenal syndrome, diagnosis of exclusion. Attempted to contact [facility] for records, would not answer calls.     Problem/Plan - 1:  ·  Problem: Hypotension.   ·  Plan: While being transported from [facility] to [facility], pt became hypotensive with BP 80/46. He received 1L NS bolus in the ambulance which did not improve his BP. Pt is afebrile, mentating well, speaking in full sentences, with no evidence of sepsis or bleeding. Concern for hepatorenal syndrome given rising creatinine i/s/o cirrhosis. Pt w stable blood pressures ON, /65.  - GI following, currently w albumin challenge considering HRS, follow pressures closely  - IR consulted for diagnostic tap, consult in, re-call tomorrow AM.  - If BP remains low after additional fluids, re-check in other arm. If BP low in both arms, or pt becomes more hemodynamically unstable, low threshold for ICU consult   - Nephrology following, appreciate recs.       Problem/Plan - 2:  ·  Problem: Cirrhosis.   ·  Plan: Pt has history of cirrhosis 2/2 significant alcohol abuse. Usually follows at [facility] where he gets paracentesis every few months, however due to insurance issues pt has not been able to follow-up at [facility] for several months. On exam, abdomen soft and distended. Pt afebrile with normal WBC count, no concern for SBP. Pt takes spironolactone 100 mg PO daily and lasix 40 mg PO daily for ascites. Held.  MELD 16, good prognosis.  - Abdominal U/S w dopplers ordered, f/u results  - f/u TTE (low suspicion for CHF given normal serum BNP)  - Will attempt to get record from [facility].     Problem/Plan - 3:  ·  Problem: Acute renal failure.   ·  Plan: Pt has elevated Cr on admission with unknown baseline. Per pt, he has no history of kidney disease. Renal failure possibly in setting of ATN vs. hepatorenal syndrome and/or dehydration as patient reports decreased PO   - Cr stabilized during hospitalization  - Outpatient nephrology follow-up     Problem/Plan - 4:  ·  Problem: Alcohol abuse.   ·  Plan: Pt has significant history of alcohol abuse, and was drinking 24 beers and 1 pint of hard liquor a day for many years at his peak. He has been sober for 7 months. CIWA 0, no concern for acute alcohol withdrawal. Pt takes acamprosate 333 mg TID for alcohol use disorder.  - Restart acamprosate on discharge.     Problem/Plan - 6:  ·  Problem: HTN (hypertension).   ·  Plan: Pt has history of HTN and takes lisinopril-HCTZ 20 mg-25 mg PO daily   - Hold home medication i/s/o hypotension; can restart on discharge or as clinically indicated.     Problem/Plan - 7:  ·  Problem: Asthma.   ·  Plan: Pt has no signs or symptoms of respiratory distress. No wheezing on exam.  - Duo nebs q6h PRN for wheezing or SOB  - Monitor respiratory status.     Problem/Plan - 8:  ·  Problem: TENZIN (obstructive sleep apnea).   ·  Plan: Pt has history of TENZIN and previously used CPAP, however he has not used CPAP for many years due to insurance issues  -  for help with obtaining CPAP at home.       Problem/Plan - 10:  ·  Problem: Diabetes mellitus.   ·  Plan; Pt reports a history of morbid obesity (improved s/p gastric bypass) and diabetes (previously took metformin and insulin). He does not currently take any medications for his diabetes.  - f/u A1c  - mISS inpatient.     Problem/Plan - 11:  ·  Problem: Chronic back pain.   ·  Plan: Pt has history of chronic back pain and takes gabapentin 300 mg PO TID  - c/w home medication while inpatient, no dose adjustment w current Cr Clearance >50 (52.4)  - Consider d/c if persistent LINNETTE/no improvement.    #Discharge: do not delete    Patient is __ yo M/F with past medical history of _____, presented with _____, found to have _____      Problem List/Main Diagnoses:     New medications/therapies:   New lines/hardware:  Labs to be followed outpatient:   Exam to be followed outpatient:     Discharge plan: discharge to ______    Physical Exam Upon Discharge:  PHYSICAL EXAM:  Constitutional: middle aged male resting, NAD, comfortable in bed.  HEENT: NC/AT, EOMI, no conjunctival pallor or scleral icterus, MMM  Neck: Supple, no JVD  Respiratory: CTA B/L. No w/r/r.   Cardiovascular: RRR, normal S1 and S2, no m/r/g.   Gastrointestinal: +BS, soft, mildly distended, no fluid wave, tender to deep palpitation RLQ, no guarding or rebound tenderness, no palpable masses   Extremities: wwp; no cyanosis, clubbing or edema.   Vascular: Pulses equal and strong throughout.   Neurological: AAOx3, no CN deficits, strength and sensation intact throughout.   Skin: No gross skin abnormalities or rashes     51y yo Male with PMH of ex-alcoholism (sober for 7 months), cirrhosis, smoking (35-40 pack year history), DM, TENZIN (no longer on CPAP due to insurance issues), asthma, PUD, abdominal hernia (per patient), chronic back pain, Hx of gastric bypass, and Hx of multiple paracentesis to abdomen who presents to the ED with generalized weakness, fatigue, decreased appetite, and generalized abdominal discomfort. Pt usually follows at [facility] where he gets his medication refills and a paracentesis every few months. Recently he has been having insurance issues which have prevented him from following up at [facility] or refilling his medications; he has run out of his meds over the past 2 months and has not had a paracentesis in several months. He denies fever, chills, headache, dizziness, lightheadedness, vision changes, chest pain, palpitations, SOB, cough, n/v, diarrhea, constipation, melena, hematochezia, dysuria, hematuria, urinary frequency/urgency, lower extremity edema, numbness, or tingling. En route from [facility] to [facility], pt hypotensive, 80/46, remained hypotensive despite 1L NS boli. Asymptomatic, mentating well, no additional symptoms. Afebrile. Labs initially signficant for LINNETTE, Cr 3.54, UA positive of trace leuks, many WBC, many bacteria. Utox positive for marijuana, cocaine. Patient denied any c/p, palps, urinary sx. CXR wnl, EKG NS. CT abdomen consistent with hepatic cirrhosis and small ascites, splenomegaly. Hyperkalemic, patient given lokelma 10g x1. Patient w elevated potassium 8/4 PM as well, given lokelma, calcium, and insulin. K responded from 6.3->5.1 in 9/5 AM. GI and Nephro followed patient, considering hepatorenal syndrome, diagnosis of exclusion. Attempted to contact [facility] for records, would not answer calls.     Problem/Plan - 1:  ·  Problem: Hypotension.   ·  Plan: While being transported from [facility] to [facility], pt became hypotensive with BP 80/46. He received 1L NS bolus in the ambulance which did not improve his BP. Pt is afebrile, mentating well, speaking in full sentences, with no evidence of sepsis or bleeding. Concern for hepatorenal syndrome given rising creatinine i/s/o cirrhosis. Pt w stable blood pressures ON, /65.  - GI following, currently w albumin challenge considering HRS, follow pressures closely  - IR consulted for diagnostic tap, consult in, re-call tomorrow AM.  - If BP remains low after additional fluids, re-check in other arm. If BP low in both arms, or pt becomes more hemodynamically unstable, low threshold for ICU consult   - Nephrology following, appreciate recs.       Problem/Plan - 2:  ·  Problem: Cirrhosis.   ·  Plan: Pt has history of cirrhosis 2/2 significant alcohol abuse. Usually follows at [facility] where he gets paracentesis every few months, however due to insurance issues pt has not been able to follow-up at [facility] for several months. On exam, abdomen soft and distended. Pt afebrile with normal WBC count, no concern for SBP. Pt takes spironolactone 100 mg PO daily and lasix 40 mg PO daily for ascites. Held.  MELD 16, good prognosis.  - Abdominal U/S w dopplers ordered, f/u results  - f/u TTE (low suspicion for CHF given normal serum BNP)  - Will attempt to get record from [facility].     Problem/Plan - 3:  ·  Problem: Acute renal failure.   ·  Plan: Pt has elevated Cr on admission with unknown baseline. Per pt, he has no history of kidney disease. Renal failure possibly in setting of ATN vs. hepatorenal syndrome and/or dehydration as patient reports decreased PO   - Cr stabilized during hospitalization  - Outpatient nephrology follow-up     Problem/Plan - 4:  ·  Problem: Alcohol abuse.   ·  Plan: Pt has significant history of alcohol abuse, and was drinking 24 beers and 1 pint of hard liquor a day for many years at his peak. He has been sober for 7 months. CIWA 0, no concern for acute alcohol withdrawal. Pt takes acamprosate 333 mg TID for alcohol use disorder.  - Restart acamprosate on discharge.     Problem/Plan - 6:  ·  Problem: HTN (hypertension).   ·  Plan: Pt has history of HTN and takes lisinopril-HCTZ 20 mg-25 mg PO daily   - Hold home medication i/s/o hypotension; can restart on discharge or as clinically indicated.     Problem/Plan - 7:  ·  Problem: Asthma.   ·  Plan: Pt has no signs or symptoms of respiratory distress. No wheezing on exam.  - Duo nebs q6h PRN for wheezing or SOB  - Monitor respiratory status.     Problem/Plan - 8:  ·  Problem: TENZIN (obstructive sleep apnea).   ·  Plan: Pt has history of TENZIN and previously used CPAP, however he has not used CPAP for many years due to insurance issues  -  for help with obtaining CPAP at home.       Problem/Plan - 10:  ·  Problem: Diabetes mellitus.   ·  Plan; Pt reports a history of morbid obesity (improved s/p gastric bypass) and diabetes (previously took metformin and insulin). He does not currently take any medications for his diabetes.  - f/u A1c  - mISS inpatient.     Problem/Plan - 11:  ·  Problem: Chronic back pain.   ·  Plan: Pt has history of chronic back pain and takes gabapentin 300 mg PO TID  - c/w home medication while inpatient, no dose adjustment w current Cr Clearance >50 (52.4)  - Consider d/c if persistent LINNETTE/no improvement.    #Discharge: do not delete    Patient is __ yo M/F with past medical history of _____, presented with _____, found to have _____      Problem List/Main Diagnoses:     New medications/therapies:   New lines/hardware:  Labs to be followed outpatient:   Exam to be followed outpatient:     Discharge plan: discharge to ______    Physical Exam Upon Discharge:  PHYSICAL EXAM:  Constitutional: middle aged male resting, NAD, comfortable in bed.  HEENT: NC/AT, EOMI, no conjunctival pallor or scleral icterus, MMM  Neck: Supple, no JVD  Respiratory: CTA B/L. No w/r/r.   Cardiovascular: RRR, normal S1 and S2, no m/r/g.   Gastrointestinal: +BS, soft, mildly distended, no fluid wave, tender to deep palpitation RLQ, no guarding or rebound tenderness, no palpable masses   Extremities: wwp; no cyanosis, clubbing or edema.   Vascular: Pulses equal and strong throughout.   Neurological: AAOx3, no CN deficits, strength and sensation intact throughout.   Skin: No gross skin abnormalities or rashes     51y yo Male with PMH of ex-alcoholism (sober for 7 months), cirrhosis, smoking (35-40 pack year history), DM, TENZIN (no longer on CPAP due to insurance issues), asthma, PUD, abdominal hernia (per patient), chronic back pain, Hx of gastric bypass, and Hx of multiple paracentesis to abdomen who presents to the ED with generalized weakness, fatigue, decreased appetite, and generalized abdominal discomfort. Pt usually follows at [facility] where he gets his medication refills and a paracentesis every few months. Recently he has been having insurance issues which have prevented him from following up at [facility] or refilling his medications; he has run out of his meds over the past 2 months and has not had a paracentesis in several months. He denies fever, chills, headache, dizziness, lightheadedness, vision changes, chest pain, palpitations, SOB, cough, n/v, diarrhea, constipation, melena, hematochezia, dysuria, hematuria, urinary frequency/urgency, lower extremity edema, numbness, or tingling. En route from [facility] to [facility], pt hypotensive, 80/46, remained hypotensive despite 1L NS boli. Asymptomatic, mentating well, no additional symptoms. Afebrile. Labs initially signficant for LINNETTE, Cr 3.54, UA positive of trace leuks, many WBC, many bacteria. Utox positive for marijuana, cocaine. Patient denied any c/p, palps, urinary sx. CXR wnl, EKG NS. CT abdomen consistent with hepatic cirrhosis and small ascites, splenomegaly. Hyperkalemic, patient given lokelma 10g x1. Patient w elevated potassium 8/4 PM as well, given lokelma, calcium, and insulin. K responded from 6.3->5.1 in 9/5 AM. GI and Nephro followed patient, considering hepatorenal syndrome, diagnosis of exclusion. Attempted to contact [facility] for records, would not answer calls.     Problem/Plan - 1:  ·  Problem: Hypotension.   ·  Plan: While being transported from [facility] to [facility], pt became hypotensive with BP 80/46. He received 1L NS bolus in the ambulance which did not improve his BP. Pt is afebrile, mentating well, speaking in full sentences, with no evidence of sepsis or bleeding. Concern for hepatorenal syndrome given rising creatinine i/s/o cirrhosis. Pt w stable blood pressures ON, /65.  - GI following, currently w albumin challenge considering HRS, follow pressures closely  - IR consulted for diagnostic tap, consult in, re-call tomorrow AM.  - If BP remains low after additional fluids, re-check in other arm. If BP low in both arms, or pt becomes more hemodynamically unstable, low threshold for ICU consult   - Nephrology following, appreciate recs.       Problem/Plan - 2:  ·  Problem: Cirrhosis.   ·  Plan: Pt has history of cirrhosis 2/2 significant alcohol abuse. Usually follows at [facility] where he gets paracentesis every few months, however due to insurance issues pt has not been able to follow-up at [facility] for several months. On exam, abdomen soft and distended. Pt afebrile with normal WBC count, no concern for SBP. Pt takes spironolactone 100 mg PO daily and lasix 40 mg PO daily for ascites. Held.  MELD 16, good prognosis.  - Abdominal U/S w dopplers ordered, f/u results  - f/u TTE (low suspicion for CHF given normal serum BNP)  - Will attempt to get record from [facility].     Problem/Plan - 3:  ·  Problem: Acute renal failure.   ·  Plan: Pt has elevated Cr on admission with unknown baseline. Per pt, he has no history of kidney disease. Renal failure possibly in setting of ATN vs. hepatorenal syndrome and/or dehydration as patient reports decreased PO   - Cr stabilized during hospitalization  - Outpatient nephrology follow-up     Problem/Plan - 4:  ·  Problem: Alcohol abuse.   ·  Plan: Pt has significant history of alcohol abuse, and was drinking 24 beers and 1 pint of hard liquor a day for many years at his peak. He has been sober for 7 months. CIWA 0, no concern for acute alcohol withdrawal. Pt takes acamprosate 333 mg TID for alcohol use disorder.  - Restart acamprosate on discharge.     Problem/Plan - 6:  ·  Problem: HTN (hypertension).   ·  Plan: Pt has history of HTN and takes lisinopril-HCTZ 20 mg-25 mg PO daily   - Hold home medication i/s/o hypotension; can restart on discharge or as clinically indicated.     Problem/Plan - 7:  ·  Problem: Asthma.   ·  Plan: Pt has no signs or symptoms of respiratory distress. No wheezing on exam.  - Duo nebs q6h PRN for wheezing or SOB  - Monitor respiratory status.     Problem/Plan - 8:  ·  Problem: TENZIN (obstructive sleep apnea).   ·  Plan: Pt has history of TENZIN and previously used CPAP, however he has not used CPAP for many years due to insurance issues  -  for help with obtaining CPAP at home.       Problem/Plan - 10:  ·  Problem: Diabetes mellitus.   ·  Plan; Pt reports a history of morbid obesity (improved s/p gastric bypass) and diabetes (previously took metformin and insulin). He does not currently take any medications for his diabetes.  - f/u A1c  - mISS inpatient.     Problem/Plan - 11:  ·  Problem: Chronic back pain.   ·  Plan: Pt has history of chronic back pain and takes gabapentin 300 mg PO TID  - c/w home medication while inpatient, no dose adjustment w current Cr Clearance >50 (52.4)  - Consider d/c if persistent LINNETTE/no improvement.    #Discharge: do not delete    Patient is __ yo M/F with past medical history of _____, presented with _____, found to have _____      Problem List/Main Diagnoses:     New medications/therapies:   New lines/hardware:  Labs to be followed outpatient:   Exam to be followed outpatient:     Discharge plan: discharge to ______    Physical Exam Upon Discharge:  PHYSICAL EXAM:  Constitutional: middle aged male resting, NAD, comfortable in bed.  HEENT: NC/AT, EOMI, no conjunctival pallor or scleral icterus, MMM  Neck: Supple, no JVD  Respiratory: CTA B/L. No w/r/r.   Cardiovascular: RRR, normal S1 and S2, no m/r/g.   Gastrointestinal: +BS, soft, mildly distended, no fluid wave, tender to deep palpitation RLQ, no guarding or rebound tenderness, no palpable masses   Extremities: wwp; no cyanosis, clubbing or edema.   Vascular: Pulses equal and strong throughout.   Neurological: AAOx3, no CN deficits, strength and sensation intact throughout.   Skin: No gross skin abnormalities or rashes     #Discharge: do not delete    51y yo Male with PMH of ex-alcoholism (sober for 7 months), cirrhosis, smoking (35-40 pack year history), DM, TENZIN (no longer on CPAP due to insurance issues), asthma, PUD, abdominal hernia (per patient), chronic back pain, Hx of gastric bypass, and Hx of multiple paracentesis to abdomen who presents to the ED with generalized weakness, fatigue, decreased appetite, and generalized abdominal discomfort.     Hospital Course:    #Hypotension:  While being transported from [facility] to [facility], pt became hypotensive with BP 80/46. He received 1L NS bolus in the ambulance which did not improve his BP. Pt is afebrile, mentating well, speaking in full sentences, with no evidence of sepsis or bleeding. Concern for hepatorenal syndrome given rising creatinine i/s/o cirrhosis. Pt w stable blood pressures ON, /65.  - GI following, currently w albumin challenge considering HRS, follow pressures closely  - IR consulted for diagnostic tap, consult in, re-call tomorrow AM.  - If BP remains low after additional fluids, re-check in other arm. If BP low in both arms, or pt becomes more hemodynamically unstable, low threshold for ICU consult   - Nephrology following, appreciate recs.       Problem/Plan - 2:  ·  Problem: Cirrhosis.   ·  Plan: Pt has history of cirrhosis 2/2 significant alcohol abuse. Usually follows at [facility] where he gets paracentesis every few months, however due to insurance issues pt has not been able to follow-up at [facility] for several months. On exam, abdomen soft and distended. Pt afebrile with normal WBC count, no concern for SBP. Pt takes spironolactone 100 mg PO daily and lasix 40 mg PO daily for ascites. Held.  MELD 16, good prognosis.  - Abdominal U/S w dopplers ordered, f/u results  - f/u TTE (low suspicion for CHF given normal serum BNP)  - Will attempt to get record from [facility].     Problem/Plan - 3:  ·  Problem: Acute renal failure.   ·  Plan: Pt has elevated Cr on admission with unknown baseline. Per pt, he has no history of kidney disease. Renal failure possibly in setting of ATN vs. hepatorenal syndrome and/or dehydration as patient reports decreased PO   - Cr stabilized during hospitalization  - Outpatient nephrology follow-up     Problem/Plan - 4:  ·  Problem: Alcohol abuse.   ·  Plan: Pt has significant history of alcohol abuse, and was drinking 24 beers and 1 pint of hard liquor a day for many years at his peak. He has been sober for 7 months. CIWA 0, no concern for acute alcohol withdrawal. Pt takes acamprosate 333 mg TID for alcohol use disorder.  - Restart acamprosate on discharge.     Problem/Plan - 6:  ·  Problem: HTN (hypertension).   ·  Plan: Pt has history of HTN and takes lisinopril-HCTZ 20 mg-25 mg PO daily   - Hold home medication i/s/o hypotension; can restart on discharge or as clinically indicated.     Problem/Plan - 7:  ·  Problem: Asthma.   ·  Plan: Pt has no signs or symptoms of respiratory distress. No wheezing on exam.  - Duo nebs q6h PRN for wheezing or SOB  - Monitor respiratory status.     Problem/Plan - 8:  ·  Problem: TENZIN (obstructive sleep apnea).   ·  Plan: Pt has history of TENZIN and previously used CPAP, however he has not used CPAP for many years due to insurance issues  -  for help with obtaining CPAP at home.       Problem/Plan - 10:  ·  Problem: Diabetes mellitus.   ·  Plan; Pt reports a history of morbid obesity (improved s/p gastric bypass) and diabetes (previously took metformin and insulin). He does not currently take any medications for his diabetes.  - f/u A1c  - mISS inpatient.     Problem/Plan - 11:  ·  Problem: Chronic back pain.   ·  Plan: Pt has history of chronic back pain and takes gabapentin 300 mg PO TID  - c/w home medication while inpatient, no dose adjustment w current Cr Clearance >50 (52.4)  - Consider d/c if persistent LINNETTE/no improvement.    New medications/therapies:   New lines/hardware:  Labs to be followed outpatient:   Exam to be followed outpatient:     Discharge plan: discharge to ______    Physical Exam Upon Discharge:  PHYSICAL EXAM:  Constitutional: middle aged male resting, NAD, comfortable in bed.  HEENT: NC/AT, EOMI, no conjunctival pallor or scleral icterus, MMM  Neck: Supple, no JVD  Respiratory: CTA B/L. No w/r/r.   Cardiovascular: RRR, normal S1 and S2, no m/r/g.   Gastrointestinal: +BS, soft, mildly distended, no fluid wave, tender to deep palpitation RLQ, no guarding or rebound tenderness, no palpable masses   Extremities: wwp; no cyanosis, clubbing or edema.   Vascular: Pulses equal and strong throughout.   Neurological: AAOx3, no CN deficits, strength and sensation intact throughout.   Skin: No gross skin abnormalities or rashes     #Discharge: do not delete    51y yo Male with PMH of ex-alcoholism (sober for 7 months), cirrhosis, smoking (35-40 pack year history), DM, TENZIN (no longer on CPAP due to insurance issues), asthma, PUD, abdominal hernia (per patient), chronic back pain, Hx of gastric bypass, and Hx of multiple paracentesis to abdomen who presents to the ED with generalized weakness, fatigue, decreased appetite, and generalized abdominal discomfort.     Hospital Course:    #Hypotension:  While being transported from [facility] to [facility], pt became hypotensive with BP 80/46. He received 1L NS bolus in the ambulance which did not improve his BP. Pt is afebrile, mentating well, speaking in full sentences, with no evidence of sepsis or bleeding. Concern for hepatorenal syndrome given rising creatinine i/s/o cirrhosis. Pt w stable blood pressures ON, /65.  - GI following, currently w albumin challenge considering HRS, follow pressures closely  - IR consulted for diagnostic tap, consult in, re-call tomorrow AM.  - If BP remains low after additional fluids, re-check in other arm. If BP low in both arms, or pt becomes more hemodynamically unstable, low threshold for ICU consult   - Nephrology following, appreciate recs.       Problem/Plan - 2:  ·  Problem: Cirrhosis.   ·  Plan: Pt has history of cirrhosis 2/2 significant alcohol abuse. Usually follows at [facility] where he gets paracentesis every few months, however due to insurance issues pt has not been able to follow-up at [facility] for several months. On exam, abdomen soft and distended. Pt afebrile with normal WBC count, no concern for SBP. Pt takes spironolactone 100 mg PO daily and lasix 40 mg PO daily for ascites. Held.  MELD 16, good prognosis.  - Abdominal U/S w dopplers ordered, f/u results  - f/u TTE (low suspicion for CHF given normal serum BNP)  - [place] [facility]     Problem/Plan - 3:  ·  Problem: Acute renal failure.   ·  Plan: Pt has elevated Cr on admission with unknown baseline. Per pt, he has no history of kidney disease. Renal failure possibly in setting of ATN vs. hepatorenal syndrome and/or dehydration as patient reports decreased PO   - Cr stabilized during hospitalization  - Outpatient nephrology follow-up     Problem/Plan - 4:  ·  Problem: Alcohol abuse.   ·  Plan: Pt has significant history of alcohol abuse, and was drinking 24 beers and 1 pint of hard liquor a day for many years at his peak. He has been sober for 7 months. CIWA 0, no concern for acute alcohol withdrawal. Pt takes acamprosate 333 mg TID for alcohol use disorder.  - Restart acamprosate on discharge.     Problem/Plan - 6:  ·  Problem: HTN (hypertension).   ·  Plan: Pt has history of HTN and takes lisinopril-HCTZ 20 mg-25 mg PO daily   - Hold home medication i/s/o hypotension; can restart on discharge or as clinically indicated.     Problem/Plan - 7:  ·  Problem: Asthma.   ·  Plan: Pt has no signs or symptoms of respiratory distress. No wheezing on exam.  - Duo nebs q6h PRN for wheezing or SOB  - Monitor respiratory status.     Problem/Plan - 8:  ·  Problem: TENZIN (obstructive sleep apnea).   ·  Plan: Pt has history of TENZIN and previously used CPAP, however he has not used CPAP for many years due to insurance issues  -  for help with obtaining CPAP at home.       Problem/Plan - 10:  ·  Problem: Diabetes mellitus.   ·  Plan; Pt reports a history of morbid obesity (improved s/p gastric bypass) and diabetes (previously took metformin and insulin). He does not currently take any medications for his diabetes.  - f/u A1c  - mISS inpatient.     Problem/Plan - 11:  ·  Problem: Chronic back pain.   ·  Plan: Pt has history of chronic back pain and takes gabapentin 300 mg PO TID  - c/w home medication while inpatient, no dose adjustment w current Cr Clearance >50 (52.4)  - Consider d/c if persistent LINNETTE/no improvement.    New medications/therapies:   New lines/hardware:  Labs to be followed outpatient:   Exam to be followed outpatient:     Discharge plan: discharge to ______    Physical Exam Upon Discharge:  PHYSICAL EXAM:  Constitutional: middle aged male resting, NAD, comfortable in bed.  HEENT: NC/AT, EOMI, no conjunctival pallor or scleral icterus, MMM  Neck: Supple, no JVD  Respiratory: CTA B/L. No w/r/r.   Cardiovascular: RRR, normal S1 and S2, no m/r/g.   Gastrointestinal: +BS, soft, mildly distended, no fluid wave, tender to deep palpitation RLQ, no guarding or rebound tenderness, no palpable masses   Extremities: wwp; no cyanosis, clubbing or edema.   Vascular: Pulses equal and strong throughout.   Neurological: AAOx3, no CN deficits, strength and sensation intact throughout.   Skin: No gross skin abnormalities or rashes     #Discharge: do not delete    51y yo Male with PMH of ex-alcoholism (sober for 7 months), cirrhosis, smoking (35-40 pack year history), DM, TENZIN (no longer on CPAP due to insurance issues), asthma, PUD, abdominal hernia (per patient), chronic back pain, Hx of gastric bypass, and Hx of multiple paracentesis to abdomen who presents to the ED with generalized weakness, fatigue, decreased appetite, and generalized abdominal discomfort.     Hospital Course:    #Hypotension:  While being transported from [facility] to [facility], pt became hypotensive with BP 80/46. He received 1L NS bolus in the ambulance which did not improve his BP. Pt is afebrile, mentating well, speaking in full sentences, with no evidence of sepsis or bleeding. Concern for hepatorenal syndrome given rising creatinine i/s/o cirrhosis. Pt w stable blood pressures ON, /65.  - GI following, currently w albumin challenge considering HRS, follow pressures closely  - IR consulted for diagnostic tap, consult in, re-call tomorrow AM.  - If BP remains low after additional fluids, re-check in other arm. If BP low in both arms, or pt becomes more hemodynamically unstable, low threshold for ICU consult   - Nephrology following, appreciate recs.       Problem/Plan - 2:  ·  Problem: Cirrhosis.   ·  Plan: Pt has history of cirrhosis 2/2 significant alcohol abuse. Usually follows at [facility] where he gets paracentesis every few months, however due to insurance issues pt has not been able to follow-up at [facility] for several months. On exam, abdomen soft and distended. Pt afebrile with normal WBC count, no concern for SBP. Pt takes spironolactone 100 mg PO daily and lasix 40 mg PO daily for ascites. Held.  MELD 16, good prognosis.  - Abdominal U/S w dopplers ordered, f/u results  - f/u TTE (low suspicion for CHF given normal serum BNP)  - Will attempt to get record from [facility] [place].     Problem/Plan - 3:  ·  Problem: Acute renal failure.   ·  Plan: Pt has elevated Cr on admission with unknown baseline. Per pt, he has no history of kidney disease. Renal failure possibly in setting of ATN vs. hepatorenal syndrome and/or dehydration as patient reports decreased PO   - Cr stabilized during hospitalization  - Outpatient nephrology follow-up     Problem/Plan - 4:  ·  Problem: Alcohol abuse.   ·  Plan: Pt has significant history of alcohol abuse, and was drinking 24 beers and 1 pint of hard liquor a day for many years at his peak. He has been sober for 7 months. CIWA 0, no concern for acute alcohol withdrawal. Pt takes acamprosate 333 mg TID for alcohol use disorder.  - Restart acamprosate on discharge.     Problem/Plan - 6:  ·  Problem: HTN (hypertension).   ·  Plan: Pt has history of HTN and takes lisinopril-HCTZ 20 mg-25 mg PO daily   - Hold home medication i/s/o hypotension; can restart on discharge or as clinically indicated.     Problem/Plan - 7:  ·  Problem: Asthma.   ·  Plan: Pt has no signs or symptoms of respiratory distress. No wheezing on exam.  - Duo nebs q6h PRN for wheezing or SOB  - Monitor respiratory status.     Problem/Plan - 8:  ·  Problem: TENZIN (obstructive sleep apnea).   ·  Plan: Pt has history of TENZIN and previously used CPAP, however he has not used CPAP for many years due to insurance issues  -  for help with obtaining CPAP at home.       Problem/Plan - 10:  ·  Problem: Diabetes mellitus.   ·  Plan; Pt reports a history of morbid obesity (improved s/p gastric bypass) and diabetes (previously took metformin and insulin). He does not currently take any medications for his diabetes.  - f/u A1c  - mISS inpatient.     Problem/Plan - 11:  ·  Problem: Chronic back pain.   ·  Plan: Pt has history of chronic back pain and takes gabapentin 300 mg PO TID  - c/w home medication while inpatient, no dose adjustment w current Cr Clearance >50 (52.4)  - Consider d/c if persistent LINNETTE/no improvement.    New medications/therapies:   New lines/hardware:  Labs to be followed outpatient:   Exam to be followed outpatient:     Discharge plan: discharge to ______    Physical Exam Upon Discharge:  PHYSICAL EXAM:  Constitutional: middle aged male resting, NAD, comfortable in bed.  HEENT: NC/AT, EOMI, no conjunctival pallor or scleral icterus, MMM  Neck: Supple, no JVD  Respiratory: CTA B/L. No w/r/r.   Cardiovascular: RRR, normal S1 and S2, no m/r/g.   Gastrointestinal: +BS, soft, mildly distended, no fluid wave, tender to deep palpitation RLQ, no guarding or rebound tenderness, no palpable masses   Extremities: wwp; no cyanosis, clubbing or edema.   Vascular: Pulses equal and strong throughout.   Neurological: AAOx3, no CN deficits, strength and sensation intact throughout.   Skin: No gross skin abnormalities or rashes     #Discharge:    51y yo Male with PMH of ex-alcoholism (sober for 7 months), cirrhosis, smoking (35-40 pack year history), DM, TENZIN (no longer on CPAP due to insurance issues), asthma, PUD, abdominal hernia (per patient), chronic back pain, Hx of gastric bypass, and Hx of multiple paracentesis to abdomen who presents to the ED with generalized weakness, fatigue, decreased appetite, and generalized abdominal discomfort.     Hospital Course:    #Hypotension:  While being transported from [facility] to [facility], pt became hypotensive with BP 80/46. He received 1L NS bolus in the ambulance which did not improve his BP. Pt is afebrile, mentating well, speaking in full sentences, with no evidence of sepsis or bleeding. Concern for hepatorenal syndrome given rising creatinine i/s/o cirrhosis. Pt w stable blood pressures ON, /65.  - GI following, currently w albumin challenge considering HRS, follow pressures closely  - IR consulted for diagnostic tap, consult in, re-call tomorrow AM.  - If BP remains low after additional fluids, re-check in other arm. If BP low in both arms, or pt becomes more hemodynamically unstable, low threshold for ICU consult   - Nephrology following, appreciate recs.       Problem/Plan - 2:  ·  Problem: Cirrhosis.   ·  Plan: Pt has history of cirrhosis 2/2 significant alcohol abuse. Usually follows at [facility] where he gets paracentesis every few months, however due to insurance issues pt has not been able to follow-up at [facility] for several months. On exam, abdomen soft and distended. Pt afebrile with normal WBC count, no concern for SBP. Pt takes spironolactone 100 mg PO daily and lasix 40 mg PO daily for ascites. Held.  MELD 16, good prognosis.  - Abdominal U/S w dopplers ordered, f/u results  - f/u TTE (low suspicion for CHF given normal serum BNP)  - Will attempt to get record from [facility].     Problem/Plan - 3:  ·  Problem: Acute renal failure.   ·  Plan: Pt has elevated Cr on admission with unknown baseline. Per pt, he has no history of kidney disease. Renal failure possibly in setting of ATN vs. hepatorenal syndrome and/or dehydration as patient reports decreased PO   - Cr stabilized during hospitalization  - Outpatient nephrology follow-up     Problem/Plan - 4:  ·  Problem: Alcohol abuse.   ·  Plan: Pt has significant history of alcohol abuse, and was drinking 24 beers and 1 pint of hard liquor a day for many years at his peak. He has been sober for 7 months. CIWA 0, no concern for acute alcohol withdrawal. Pt takes acamprosate 333 mg TID for alcohol use disorder.  - Restart acamprosate on discharge.     Problem/Plan - 6:  ·  Problem: HTN (hypertension).   ·  Plan: Pt has history of HTN and takes lisinopril-HCTZ 20 mg-25 mg PO daily   - Hold home medication i/s/o hypotension; can restart on discharge or as clinically indicated.     Problem/Plan - 7:  ·  Problem: Asthma.   ·  Plan: Pt has no signs or symptoms of respiratory distress. No wheezing on exam.  - Duo nebs q6h PRN for wheezing or SOB  - Monitor respiratory status.     Problem/Plan - 8:  ·  Problem: TENZIN (obstructive sleep apnea).   ·  Plan: Pt has history of TENZIN and previously used CPAP, however he has not used CPAP for many years due to insurance issues  -  for help with obtaining CPAP at home.       Problem/Plan - 10:  ·  Problem: Diabetes mellitus.   ·  Plan; Pt reports a history of morbid obesity (improved s/p gastric bypass) and diabetes (previously took metformin and insulin). He does not currently take any medications for his diabetes.  - f/u A1c  - mISS inpatient.     Problem/Plan - 11:  ·  Problem: Chronic back pain.   ·  Plan: Pt has history of chronic back pain and takes gabapentin 300 mg PO TID  - c/w home medication while inpatient, no dose adjustment w current Cr Clearance >50 (52.4)  - Consider d/c if persistent LINNETTE/no improvement.    New medications/therapies:   New lines/hardware:  Labs to be followed outpatient:   Exam to be followed outpatient:     Discharge plan: discharge to ______    Physical Exam Upon Discharge:  PHYSICAL EXAM:  Constitutional: middle aged male resting, NAD, comfortable in bed.  HEENT: NC/AT, EOMI, no conjunctival pallor or scleral icterus, MMM  Neck: Supple, no JVD  Respiratory: CTA B/L. No w/r/r.   Cardiovascular: RRR, normal S1 and S2, no m/r/g.   Gastrointestinal: +BS, soft, mildly distended, no fluid wave, tender to deep palpitation RLQ, no guarding or rebound tenderness, no palpable masses   Extremities: wwp; no cyanosis, clubbing or edema.   Vascular: Pulses equal and strong throughout.   Neurological: AAOx3, no CN deficits, strength and sensation intact throughout.   Skin: No gross skin abnormalities or rashes     #Discharge:    51y yo Male with PMH of ex-alcoholism (sober for 7 months), cirrhosis, smoking (35-40 pack year history), DM, TENZIN (no longer on CPAP due to insurance issues), asthma, PUD, abdominal hernia (per patient), chronic back pain, Hx of gastric bypass, and Hx of multiple paracentesis to abdomen who presents to the ED with generalized weakness, fatigue, decreased appetite, and generalized abdominal discomfort.     Hospital Course:    #Hypotension:  While being transported from [facility] to [facility], pt became hypotensive with BP 80/46. He received 1L NS bolus in the ambulance which did not improve his BP. Pt is afebrile, mentating well, speaking in full sentences, with no evidence of sepsis or bleeding. Concern for hepatorenal syndrome given rising creatinine i/s/o cirrhosis. Pt w stable blood pressures ON, /65.  - GI following, currently w albumin challenge considering HRS, follow pressures closely  - IR consulted for diagnostic tap, consult in, re-call tomorrow AM.  - If BP remains low after additional fluids, re-check in other arm. If BP low in both arms, or pt becomes more hemodynamically unstable, low threshold for ICU consult   - Nephrology following, appreciate recs.       Problem/Plan - 2:  ·  Problem: Cirrhosis.   ·  Plan: Pt has history of cirrhosis 2/2 significant alcohol abuse. Usually follows at [facility] where he gets paracentesis every few months, however due to insurance issues pt has not been able to follow-up at [facility] for several months. On exam, abdomen soft and distended. Pt afebrile with normal WBC count, no concern for SBP. Pt takes spironolactone 100 mg PO daily and lasix 40 mg PO daily for ascites. Held.  MELD 16, good prognosis.  - Abdominal U/S w dopplers ordered, f/u results  - f/u TTE (low suspicion for CHF given normal serum BNP)  - Will attempt to get record from [facility].     Problem/Plan - 3:  ·  Problem: Acute renal failure.   ·  Plan: Pt has elevated Cr on admission with unknown baseline. Per pt, he has no history of kidney disease. Renal failure possibly in setting of ATN vs. hepatorenal syndrome and/or dehydration as patient reports decreased PO   - Cr stabilized during hospitalization  - Outpatient nephrology follow-up     Problem/Plan - 4:  ·  Problem: Alcohol abuse.   ·  Plan: Pt has significant history of alcohol abuse, and was drinking 24 beers and 1 pint of hard liquor a day for many years at his peak. He has been sober for 7 months. CIWA 0, no concern for acute alcohol withdrawal. Pt takes acamprosate 333 mg TID for alcohol use disorder.  - Restart acamprosate on discharge.     Problem/Plan - 6:  ·  Problem: HTN (hypertension).   ·  Plan: Pt has history of HTN and takes lisinopril-HCTZ 20 mg-25 mg PO daily   - Hold home medication i/s/o hypotension; can restart on discharge or as clinically indicated.     Problem/Plan - 7:  ·  Problem: Asthma.   ·  Plan: Pt has no signs or symptoms of respiratory distress. No wheezing on exam.  - Duo nebs q6h PRN for wheezing or SOB  - Monitor respiratory status.     Problem/Plan - 8:  ·  Problem: TENZIN (obstructive sleep apnea).   ·  Plan: Pt has history of TENZIN and previously used CPAP, however he has not used CPAP for many years due to insurance issues  -  for help with obtaining CPAP at home.       Problem/Plan - 10:  ·  Problem: Diabetes mellitus.   ·  Plan; Pt reports a history of morbid obesity (improved s/p gastric bypass) and diabetes (previously took metformin and insulin). He does not currently take any medications for his diabetes.  - f/u A1c  - mISS inpatient.     Problem/Plan - 11:  ·  Problem: Chronic back pain.   ·  Plan: Pt has history of chronic back pain and takes gabapentin 300 mg PO TID  - c/w home medication while inpatient, no dose adjustment w current Cr Clearance >50 (52.4)  - Consider d/c if persistent LINNETTE/no improvement.    New medications/therapies:   New lines/hardware:  Labs to be followed outpatient:   Exam to be followed outpatient:     Discharge plan: discharge to ______    Physical Exam Upon Discharge:  PHYSICAL EXAM:  Constitutional: middle aged male resting, NAD, comfortable in bed.  HEENT: NC/AT, EOMI, no conjunctival pallor or scleral icterus, MMM  Neck: Supple, no JVD  Respiratory: CTA B/L. No w/r/r.   Cardiovascular: RRR, normal S1 and S2, no m/r/g.   Gastrointestinal: +BS, soft, mildly distended, no fluid wave, tender to deep palpitation RLQ, no guarding or rebound tenderness, no palpable masses   Extremities: wwp; no cyanosis, clubbing or edema.   Vascular: Pulses equal and strong throughout.   Neurological: AAOx3, no CN deficits, strength and sensation intact throughout.   Skin: No gross skin abnormalities or rashes     #Discharge:    51y yo Male with PMH of ex-alcoholism (sober for 7 months), cirrhosis, smoking (35-40 pack year history), DM, TENZIN (no longer on CPAP due to insurance issues), asthma, PUD, abdominal hernia (per patient), chronic back pain, Hx of gastric bypass, and Hx of multiple paracentesis to abdomen who presents to the ED with generalized weakness, fatigue, decreased appetite, and generalized abdominal discomfort.     Hospital Course:    #Hypotension:  While being transported from [facility] to [facility], pt became hypotensive with BP 80/46. He received 1L NS bolus in the ambulance which did not improve his BP. Pt is afebrile, mentating well, speaking in full sentences, with no evidence of sepsis or bleeding. Concern for hepatorenal syndrome given rising creatinine i/s/o cirrhosis. Pt w stable blood pressures ON, /65.  - GI following, currently w albumin challenge considering HRS, follow pressures closely  - IR consulted for diagnostic tap, consult in, re-call tomorrow AM.  - If BP remains low after additional fluids, re-check in other arm. If BP low in both arms, or pt becomes more hemodynamically unstable, low threshold for ICU consult   - Nephrology following, appreciate recs.       Problem/Plan - 2:  ·  Problem: Cirrhosis.   ·  Plan: Pt has history of cirrhosis 2/2 significant alcohol abuse. Usually follows at [facility] where he gets paracentesis every few months, however due to insurance issues pt has not been able to follow-up at [facility] for several months. On exam, abdomen soft and distended. Pt afebrile with normal WBC count, no concern for SBP. Pt takes spironolactone 100 mg PO daily and lasix 40 mg PO daily for ascites. Held.  MELD 16, good prognosis.  - Abdominal U/S w dopplers ordered, f/u results  - f/u TTE (low suspicion for CHF given normal serum BNP)  - Will attempt to get record from [facility] [place].     Problem/Plan - 3:  ·  Problem: Acute renal failure.   ·  Plan: Pt has elevated Cr on admission with unknown baseline. Per pt, he has no history of kidney disease. Renal failure possibly in setting of ATN vs. hepatorenal syndrome and/or dehydration as patient reports decreased PO   - Cr stabilized during hospitalization  - Outpatient nephrology follow-up     Problem/Plan - 4:  ·  Problem: Alcohol abuse.   ·  Plan: Pt has significant history of alcohol abuse, and was drinking 24 beers and 1 pint of hard liquor a day for many years at his peak. He has been sober for 7 months. CIWA 0, no concern for acute alcohol withdrawal. Pt takes acamprosate 333 mg TID for alcohol use disorder.  - Restart acamprosate on discharge.     Problem/Plan - 6:  ·  Problem: HTN (hypertension).   ·  Plan: Pt has history of HTN and takes lisinopril-HCTZ 20 mg-25 mg PO daily   - Hold home medication i/s/o hypotension; can restart on discharge or as clinically indicated.     Problem/Plan - 7:  ·  Problem: Asthma.   ·  Plan: Pt has no signs or symptoms of respiratory distress. No wheezing on exam.  - Duo nebs q6h PRN for wheezing or SOB  - Monitor respiratory status.     Problem/Plan - 8:  ·  Problem: TENZIN (obstructive sleep apnea).   ·  Plan: Pt has history of TENZIN and previously used CPAP, however he has not used CPAP for many years due to insurance issues  -  for help with obtaining CPAP at home.       Problem/Plan - 10:  ·  Problem: Diabetes mellitus.   ·  Plan; Pt reports a history of morbid obesity (improved s/p gastric bypass) and diabetes (previously took metformin and insulin). He does not currently take any medications for his diabetes.  - f/u A1c  - mISS inpatient.     Problem/Plan - 11:  ·  Problem: Chronic back pain.   ·  Plan: Pt has history of chronic back pain and takes gabapentin 300 mg PO TID  - c/w home medication while inpatient, no dose adjustment w current Cr Clearance >50 (52.4)  - Consider d/c if persistent LINNETTE/no improvement.    New medications/therapies:   New lines/hardware:  Labs to be followed outpatient:   Exam to be followed outpatient:     Discharge plan: discharge to ______    Physical Exam Upon Discharge:  PHYSICAL EXAM:  Constitutional: middle aged male resting, NAD, comfortable in bed.  HEENT: NC/AT, EOMI, no conjunctival pallor or scleral icterus, MMM  Neck: Supple, no JVD  Respiratory: CTA B/L. No w/r/r.   Cardiovascular: RRR, normal S1 and S2, no m/r/g.   Gastrointestinal: +BS, soft, mildly distended, no fluid wave, tender to deep palpitation RLQ, no guarding or rebound tenderness, no palpable masses   Extremities: wwp; no cyanosis, clubbing or edema.   Vascular: Pulses equal and strong throughout.   Neurological: AAOx3, no CN deficits, strength and sensation intact throughout.   Skin: No gross skin abnormalities or rashes     #Discharge:    51y yo Male with PMH of ex-alcoholism (sober for 7 months), cirrhosis, smoking (35-40 pack year history), DM, TENZIN (no longer on CPAP due to insurance issues), asthma, PUD, abdominal hernia (per patient), chronic back pain, Hx of gastric bypass, and Hx of multiple paracentesis to abdomen who presents to the ED with generalized weakness, fatigue, decreased appetite, and generalized abdominal discomfort.     Hospital Course:    #Hypotension:  While being transported from [facility] to [facility], pt became hypotensive with BP 80/46. He received 1L NS bolus in the ambulance which did not improve his BP. Pt is afebrile, mentating well, speaking in full sentences, with no evidence of sepsis or bleeding. Concern for hepatorenal syndrome given rising creatinine i/s/o cirrhosis. Pt w stable blood pressures ON, /65. GI and nephrology were both consulted during patient's visit. They recommended a 2 day albumin challenge, to which the patient's Cr and BP improved. IR was consulted for a diagnostic tap, but they deemed it unnecessary as patient does not have leukocytosis and there is minimal ascites.     #Cirrhosis  Pt has history of cirrhosis 2/2 significant alcohol abuse. Usually follows at [facility] where he gets paracentesis every few months, however due to insurance issues pt has not been able to follow-up at [facility] for several months. On exam, abdomen soft and distended. Pt afebrile with normal WBC count, no concern for SBP. Pt takes spironolactone 100 mg PO daily and lasix 40 mg PO daily for ascites. Spironolactone 100mg, lasix 40mg were both held during patient's admission. Lasix was restarted upon discharge. Patient should follow up with outpatient GI in one week for spironolactone.   MELD 16, good prognosis.    #Acute renal failure  Pt has elevated Cr on admission with unknown baseline. Per pt, he has no history of kidney disease. Renal failure possibly in setting of ATN vs. hepatorenal syndrome and/or dehydration as patient reports decreased PO. Cr has stabilized during this admission after the 2 day albumin challenge.     #Alcohol abuse  Pt has significant history of alcohol abuse, and was drinking 24 beers and 1 pint of hard liquor a day for many years at his peak. He has been sober for 7 months. CIWA 0, no concern for acute alcohol withdrawal. Pt takes acamprosate 333 mg TID for alcohol use disorder. Patient's acamprosate was held during this admission but can be restarted upon discharge.     #HTN (hypertension)  Pt has history of HTN and takes lisinopril-HCTZ 20 mg-25 mg PO daily. Lisinopril-HCTZ was held during this admission due to LINNETTE, patient will be instructed to follow up will outpatient PCP.     #Asthma  Pt has no signs or symptoms of respiratory distress. No wheezing on exam. Patient had Duonebs q6h PRN for wheezing or SOB.    #TENZIN (obstructive sleep apnea)  Pt has history of TENZIN and previously used CPAP, however he has not used CPAP for many years due to insurance issues. Social workers are trying to help obtain a CPAP for this patient.     #Diabetes mellitus  Pt reports a history of morbid obesity (improved s/p gastric bypass) and diabetes (previously took metformin and insulin). He does not currently take any medications for his diabetes.    #Chronic back pain  Pt has history of chronic back pain and takes gabapentin 300 mg PO TID, patient received his home meds during this admission.     New medications: please start lactulose 20g every 8 hours   Changes to old medications: no changes to old medications   Medications that were stopped: spironolactone 100mg qdaily and lisionpril-HCTZ 20mg-25mg qdaily.    Things to follow up as outpatient: Please follow up with GI outpatient and please follow up with a primary care provider to establish care.     Discharge plan: discharge to shelter.     Physical Exam Upon Discharge:  PHYSICAL EXAM:  Constitutional: middle aged male resting, NAD, comfortable in bed.  HEENT: NC/AT, EOMI, no conjunctival pallor or scleral icterus, MMM  Neck: Supple, no JVD  Respiratory: CTA B/L. No w/r/r.   Cardiovascular: RRR, normal S1 and S2, no m/r/g.   Gastrointestinal: +BS, soft, mildly distended, no fluid wave, tender to deep palpitation RLQ, no guarding or rebound tenderness, no palpable masses   Extremities: wwp; no cyanosis, clubbing or edema.   Vascular: Pulses equal and strong throughout.   Neurological: AAOx3, no CN deficits, strength and sensation intact throughout.   Skin: No gross skin abnormalities or rashes     #Discharge:    51y yo Male with PMH of ex-alcoholism (sober for 7 months), cirrhosis, smoking (35-40 pack year history), DM, TENZIN (no longer on CPAP due to insurance issues), asthma, PUD, abdominal hernia (per patient), chronic back pain, Hx of gastric bypass, and Hx of multiple paracentesis to abdomen who presents to the ED with generalized weakness, fatigue, decreased appetite, and generalized abdominal discomfort.     Hospital Course:    #Hypotension:  While being transported from [facility] to [facility], pt became hypotensive with BP 80/46. He received 1L NS bolus in the ambulance which did not improve his BP. Pt is afebrile, mentating well, speaking in full sentences, with no evidence of sepsis or bleeding. Concern for hepatorenal syndrome given rising creatinine i/s/o cirrhosis. Pt w stable blood pressures ON, /65. GI and nephrology were both consulted during patient's visit. They recommended a 2 day albumin challenge, to which the patient's Cr and BP improved. IR was consulted for a diagnostic tap, but they deemed it unnecessary as patient does not have leukocytosis and there is minimal ascites.     #Cirrhosis  Pt has history of cirrhosis 2/2 significant alcohol abuse. Usually follows at [facility] where he gets paracentesis every few months, however due to insurance issues pt has not been able to follow-up at [facility] for several months. On exam, abdomen soft and distended. Pt afebrile with normal WBC count, no concern for SBP. Pt takes spironolactone 100 mg PO daily and lasix 40 mg PO daily for ascites. Spironolactone 100mg, lasix 40mg were both held during patient's admission. Lasix was restarted upon discharge. Patient should follow up with outpatient GI in one week for spironolactone.   MELD 16, good prognosis.    #Acute renal failure  Pt has elevated Cr on admission with unknown baseline. Per pt, he has no history of kidney disease. Renal failure possibly in setting of ATN vs. hepatorenal syndrome and/or dehydration as patient reports decreased PO. Cr has stabilized during this admission after the 2 day albumin challenge.     #Alcohol abuse  Pt has significant history of alcohol abuse, and was drinking 24 beers and 1 pint of hard liquor a day for many years at his peak. He has been sober for 7 months. CIWA 0, no concern for acute alcohol withdrawal. Pt takes acamprosate 333 mg TID for alcohol use disorder. Patient's acamprosate was held during this admission but can be restarted upon discharge.     #HTN (hypertension)  Pt has history of HTN and takes lisinopril-HCTZ 20 mg-25 mg PO daily. Lisinopril-HCTZ was held during this admission due to LINNETTE, patient will be instructed to follow up will outpatient PCP.     #Asthma  Pt has no signs or symptoms of respiratory distress. No wheezing on exam. Patient had Duonebs q6h PRN for wheezing or SOB.    #TENZIN (obstructive sleep apnea)  Pt has history of TENZIN and previously used CPAP, however he has not used CPAP for many years due to insurance issues. Social workers are trying to help obtain a CPAP for this patient.     #Diabetes mellitus  Pt reports a history of morbid obesity (improved s/p gastric bypass) and diabetes (previously took metformin and insulin). He does not currently take any medications for his diabetes.    #Chronic back pain  Pt has history of chronic back pain and takes gabapentin 300 mg PO TID, patient received his home meds during this admission.     New medications: please start lactulose 20g every 8 hours   Changes to old medications: no changes to old medications   Medications that were stopped: spironolactone 100mg qdaily and lisionpril-HCTZ 20mg-25mg qdaily.    Things to follow up as outpatient: Please follow up with GI outpatient and please follow up with a primary care provider to establish care.     Discharge plan: discharge to shelter.     Physical Exam Upon Discharge:  PHYSICAL EXAM:  Constitutional: middle aged male resting, NAD, comfortable in bed.  HEENT: NC/AT, EOMI, no conjunctival pallor or scleral icterus, MMM  Neck: Supple, no JVD  Respiratory: CTA B/L. No w/r/r.   Cardiovascular: RRR, normal S1 and S2, no m/r/g.   Gastrointestinal: +BS, soft, mildly distended, no fluid wave, tender to deep palpitation RLQ, no guarding or rebound tenderness, no palpable masses   Extremities: wwp; no cyanosis, clubbing or edema.   Vascular: Pulses equal and strong throughout.   Neurological: AAOx3, no CN deficits, strength and sensation intact throughout.   Skin: No gross skin abnormalities or rashes     #Discharge:    51y yo Male with PMH of ex-alcoholism (sober for 7 months), cirrhosis, smoking (35-40 pack year history), DM, TENZIN (no longer on CPAP due to insurance issues), asthma, PUD, abdominal hernia (per patient), chronic back pain, Hx of gastric bypass, and Hx of multiple paracentesis to abdomen who presents to the ED with generalized weakness, fatigue, decreased appetite, and generalized abdominal discomfort.     Hospital Course:    #Hypotension:  While being transported from [facility] to [facility], pt became hypotensive with BP 80/46. He received 1L NS bolus in the ambulance which did not improve his BP. Pt is afebrile, mentating well, speaking in full sentences, with no evidence of sepsis or bleeding. Concern for hepatorenal syndrome given rising creatinine i/s/o cirrhosis. Pt w stable blood pressures ON, /65. GI and nephrology were both consulted during patient's visit. They recommended a 2 day albumin challenge, to which the patient's Cr and BP improved. IR was consulted for a diagnostic tap, but they deemed it unnecessary as patient does not have leukocytosis and there is minimal ascites.     #Cirrhosis  Pt has history of cirrhosis 2/2 significant alcohol abuse. Usually follows at [facility] where he gets paracentesis every few months, however due to insurance issues pt has not been able to follow-up at [facility] for several months. On exam, abdomen soft and distended. Pt afebrile with normal WBC count, no concern for SBP. Pt takes spironolactone 100 mg PO daily and lasix 40 mg PO daily for ascites. Spironolactone 100mg, lasix 40mg were both held during patient's admission. Lasix was restarted upon discharge. Patient should follow up with outpatient GI in one week for spironolactone.   MELD 16, good prognosis.    #Acute renal failure  Pt has elevated Cr on admission with unknown baseline. Per pt, he has no history of kidney disease. Renal failure possibly in setting of ATN vs. hepatorenal syndrome and/or dehydration as patient reports decreased PO. Cr has stabilized during this admission after the 2 day albumin challenge.     #Alcohol abuse  Pt has significant history of alcohol abuse, and was drinking 24 beers and 1 pint of hard liquor a day for many years at his peak. He has been sober for 7 months. CIWA 0, no concern for acute alcohol withdrawal. Pt takes acamprosate 333 mg TID for alcohol use disorder. Patient's acamprosate was held during this admission but can be restarted upon discharge.     #HTN (hypertension)  Pt has history of HTN and takes lisinopril-HCTZ 20 mg-25 mg PO daily. Lisinopril-HCTZ was held during this admission due to LINNETTE, patient will be instructed to follow up will outpatient PCP.     #Asthma  Pt has no signs or symptoms of respiratory distress. No wheezing on exam. Patient had Duonebs q6h PRN for wheezing or SOB.    #TENZIN (obstructive sleep apnea)  Pt has history of TENZIN and previously used CPAP, however he has not used CPAP for many years due to insurance issues. Social workers are trying to help obtain a CPAP for this patient.     #Diabetes mellitus  Pt reports a history of morbid obesity (improved s/p gastric bypass) and diabetes (previously took metformin and insulin). He does not currently take any medications for his diabetes.    #Chronic back pain  Pt has history of chronic back pain and takes gabapentin 300 mg PO TID, patient received his home meds during this admission.     New medications: please start lactulose 20g every 8 hours   Changes to old medications: no changes to old medications   Medications that were stopped: spironolactone 100mg qdaily and lisionpril-HCTZ 20mg-25mg qdaily.    Things to follow up as outpatient: Please follow up with GI outpatient and please follow up with a primary care provider to establish care.     Discharge plan: discharge to shelter.     Physical Exam Upon Discharge:  PHYSICAL EXAM:  Constitutional: middle aged male resting, NAD, comfortable in bed.  HEENT: NC/AT, EOMI, no conjunctival pallor or scleral icterus, MMM  Neck: Supple, no JVD  Respiratory: CTA B/L. No w/r/r.   Cardiovascular: RRR, normal S1 and S2, no m/r/g.   Gastrointestinal: +BS, soft, mildly distended, no fluid wave, tender to deep palpitation RLQ, no guarding or rebound tenderness, no palpable masses   Extremities: wwp; no cyanosis, clubbing or edema.   Vascular: Pulses equal and strong throughout.   Neurological: AAOx3, no CN deficits, strength and sensation intact throughout.   Skin: No gross skin abnormalities or rashes

## 2022-09-05 NOTE — PROGRESS NOTE ADULT - PROBLEM SELECTOR PLAN 1
While being transported from [facility] to [facility], pt became hypotensive with BP 80/46. He received 1L NS bolus in the ambulance which did not improve his BP. Pt is afebrile, mentating well, speaking in full sentences, with no evidence of sepsis or bleeding. Concern for hepatorenal syndrome given rising creatinine i/s/o cirrhosis. Pt w stable blood pressures ON, /65.  - GI following, currently w albumin challenge considering HRS, follow pressures closely  - f/u w GI if need for paracentesis  - If BP remains low after additional fluids, re-check in other arm. If BP low in both arms, or pt becomes more hemodynamically unstable, low threshold for ICU consult   - Nephrology following, appreciate recs While being transported from [facility] to [facility], pt became hypotensive with BP 80/46. He received 1L NS bolus in the ambulance which did not improve his BP. Pt is afebrile, mentating well, speaking in full sentences, with no evidence of sepsis or bleeding. Concern for hepatorenal syndrome given rising creatinine i/s/o cirrhosis. Pt w stable blood pressures ON, /65.  - GI following, currently w albumin challenge considering HRS, follow pressures closely  - f/u w GI if need for paracentesis  - If BP remains low after additional fluids, re-check in other arm. If BP low in both arms, or pt becomes more hemodynamically unstable, low threshold for ICU consult   - Nephrology following, appreciate recs While being transported from [facility] to [facility], pt became hypotensive with BP 80/46. He received 1L NS bolus in the ambulance which did not improve his BP. Pt is afebrile, mentating well, speaking in full sentences, with no evidence of sepsis or bleeding. Concern for hepatorenal syndrome given rising creatinine i/s/o cirrhosis. Pt w stable blood pressures ON, /65.  - GI following, currently w albumin challenge considering HRS, follow pressures closely  - f/u w GI if need for paracentesis  - If BP remains low after additional fluids, re-check in other arm. If BP low in both arms, or pt becomes more hemodynamically unstable, low threshold for ICU consult   - Nephrology following, appreciate recs While being transported from [facility] to [facility], pt became hypotensive with BP 80/46. He received 1L NS bolus in the ambulance which did not improve his BP. Pt is afebrile, mentating well, speaking in full sentences, with no evidence of sepsis or bleeding. Concern for hepatorenal syndrome given rising creatinine i/s/o cirrhosis. Pt w stable blood pressures ON, /65.  - GI following, currently w albumin challenge considering HRS, follow pressures closely  - IR consulted for diagnostic tap, consult in, re-call tomorrow AM.  - If BP remains low after additional fluids, re-check in other arm. If BP low in both arms, or pt becomes more hemodynamically unstable, low threshold for ICU consult   - Nephrology following, appreciate recs While being transported from [facility] to [facility], pt became hypotensive with BP 80/46. He received 1L NS bolus in the ambulance which did not improve his BP. Pt is afebrile, mentating well, speaking in full sentences, with no evidence of sepsis or bleeding. Concern for hepatorenal syndrome given rising creatinine i/s/o cirrhosis. Pt w stable blood pressures ON, /65.  - GI following, currently w albumin challenge considering HRS, follow pressures closely  - IR consulted for diagnostic tap, consult in, re-call tomorrow AM.  - If BP remains low after additional fluids, re-check in other arm. If BP low in both arms, or pt becomes more hemodynamically unstable, low threshold for ICU consult   - Nephrology following, appreciate recs While being transported from [facility] to [facility], pt became hypotensive with BP 80/46. He received 1L NS bolus in the ambulance which did not improve his BP. Pt is afebrile, mentating well, speaking in full sentences, with no evidence of sepsis or bleeding. Concern for hepatorenal syndrome given rising creatinine i/s/o cirrhosis. Pt w stable blood pressures ON, /65.  - GI following, currently w albumin challenge considering HRS, follow pressures closely  - IR consulted for diagnostic tap, consult in, re-call tomorrow AM.  - If BP remains low after additional fluids, re-check in other arm. If BP low in both arms, or pt becomes more hemodynamically unstable, low threshold for ICU consult   - Nephrology following, appreciate recs

## 2022-09-05 NOTE — PROGRESS NOTE ADULT - PROBLEM SELECTOR PLAN 2
Pt has history of cirrhosis 2/2 significant alcohol abuse. Usually follows at [facility] where he gets paracentesis every few months, however due to insurance issues pt has not been able to follow-up at [facility] for several months. On exam, abdomen soft and distended. Pt afebrile with normal WBC count, no concern for SBP. Pt takes spironolactone 100 mg PO daily and lasix 40 mg PO daily for ascites.  MELD 16 this AM.  - If pt becomes febrile or has increasing WBC count, initiate antibiotic prophylaxis for SBP   - Pt will need paracentesis, but non-emergent currently, f/u w GI surrounding procedure  - Abdominal U/S w dopplers ordered, f/u results  - f/u TTE (low suspicion for CHF given normal serum BNP)  - GI following, appreciate recs  - Will attempt to get record from [facility] Pt has history of cirrhosis 2/2 significant alcohol abuse. Usually follows at [facility] where he gets paracentesis every few months, however due to insurance issues pt has not been able to follow-up at [facility] for several months. On exam, abdomen soft and distended. Pt afebrile with normal WBC count, no concern for SBP. Pt takes spironolactone 100 mg PO daily and lasix 40 mg PO daily for ascites.  MELD 16 this AM.  - If pt becomes febrile or has increasing WBC count, initiate antibiotic prophylaxis for SBP   - Pt will need paracentesis, but non-emergent currently, f/u w GI surrounding procedure  - Abdominal U/S w dopplers ordered, f/u results  - f/u TTE (low suspicion for CHF given normal serum BNP)  - GI following, appreciate recs  - Will attempt to get record from [facility] Pt has history of cirrhosis 2/2 significant alcohol abuse. Usually follows at [facility] where he gets paracentesis every few months, however due to insurance issues pt has not been able to follow-up at [facility] for several months. On exam, abdomen soft and distended. Pt afebrile with normal WBC count, no concern for SBP. Pt takes spironolactone 100 mg PO daily and lasix 40 mg PO daily for ascites.  MELD 16 this AM.  - If pt becomes febrile or has increasing WBC count, initiate antibiotic prophylaxis for SBP   - Pt will need paracentesis, but non-emergent currently, f/u w GI surrounding procedure  - Abdominal U/S w dopplers ordered, f/u results  - f/u TTE (low suspicion for CHF given normal serum BNP)  - GI following, appreciate recs  - Will attempt to get record from [facility]

## 2022-09-05 NOTE — PROGRESS NOTE ADULT - ASSESSMENT
Pt is a 52 yo M w/ PMH ex-alcoholism, cirrhosis, smoking, DM, TENZIN, asthma, PUD, abdominal hernia (per patient), chronic back pain, Hx of gastric bypass, and Hx of multiple paracentesis to abdomen who presents to ED with generalized weakness, fatigue, decreased appetite, and generalized abdominal discomfort. Pt is a 50 yo M w/ PMH ex-alcoholism, cirrhosis, smoking, DM, TENZIN, asthma, PUD, abdominal hernia (per patient), chronic back pain, Hx of gastric bypass, and Hx of multiple paracentesis to abdomen who presents to ED with generalized weakness, fatigue, decreased appetite, and generalized abdominal discomfort.

## 2022-09-05 NOTE — PROGRESS NOTE ADULT - ATTENDING COMMENTS
Patient was seen and examined at bedside on 9/5/2022 at 1030 am. Patient reports feeling well. Denies CP. ROS is otherwise negative. Vitals, labwork and pertinent imaging reviewed - improving Cr. Physical exam - NAD, AAO x 4, PERRLA, EOMI, MMM, supple neck, chest - CTA b/l, CV - rrr, s1s2, no m/r/g, abd - soft, NTND, + BS, ext - wwp, psych - normal affect, skin - no rash    Plan  -Check diagnostic paracentesis  -Obtain collateral from [facility]  -Trend Cr., can likely be d/c today vs tomorrow

## 2022-09-05 NOTE — PROGRESS NOTE ADULT - ATTENDING COMMENTS
Patient seen and discussed with GI fellow; plan as noted above    - appreciate renal recommendations  - f/u us with dopplers  - records from [facility]  - diagnostic para  - albumin challenge Patient seen and discussed with GI fellow; plan as noted above    - appreciate renal recommendations  - f/u us with dopplers  - records from [facility]  - diagnostic para  - albumin challenge Patient seen and discussed with GI fellow; plan as noted above    - appreciate renal recommendations  - f/u us with dopplers  - records from [facility]  - diagnostic para  - albumin challenge

## 2022-09-05 NOTE — PROGRESS NOTE ADULT - ASSESSMENT
50 yo M, PMH of EtOH use Disorder in remission, Cirrhosis (presumed EtOH) requiring serial paracentesis, active smoker, former obesity s/p bariatric surgery, DM, TENZIN, asthma, PUD, chronic back pain,  pw generalized weakness, fatigue, decreased appetite, and generalized abdominal discomfort.     GI was consulted for history of cirrhosis and elevated Cr    #Cirrhosis (presumed EtOH), MELD Na - 23-> 19 -> 16 (9/5/22)  #LINNETTE , unknown baseline, improving  #Small Ascites  #Normocytic Anemia    Recommendations:  - HRS is a diagnosis of exclusion - please trend Cr (via CMP) TID  - Appreciate Renal input  - f/u Abdominal Doppler (US w/ Doppler, CT was noncontrast)  - Please obtain records of cirrhosis workup from [facility] - EGD and Path reports, Ascitic fluid studies, Serologic workup of Cirrhosis, etc.  - Given concern for Type 1 HRS (unknown baseline), please dc diuretics until Cr stabilizes (patient is not overtly volume overloaded)  - Recommend diagnostic para to r/o SBP with IR  - Continue albumin challenge (day 2 20-40gm) - monitor for volume overload  - Avoid all NSAID's (nephrotoxic)    Thank you for the courtesy of this consult. We will follow along with you.    Ottoniel Almaraz M.D.  Gastroenterology Fellow  Pager: [phone] 50 yo M, PMH of EtOH use Disorder in remission, Cirrhosis (presumed EtOH) requiring serial paracentesis, active smoker, former obesity s/p bariatric surgery, DM, TENZIN, asthma, PUD, chronic back pain,  pw generalized weakness, fatigue, decreased appetite, and generalized abdominal discomfort.     GI was consulted for history of cirrhosis and elevated Cr    #Cirrhosis (presumed EtOH), MELD Na - 23-> 19 -> 16 (9/5/22)  #LINNETTE , unknown baseline, improving  #Small Ascites  #Normocytic Anemia    Recommendations:  - HRS is a diagnosis of exclusion - please trend Cr (via CMP) TID  - Appreciate Renal input  - f/u Abdominal Doppler (US w/ Doppler, CT was noncontrast)  - Please obtain records of cirrhosis workup from [facility] - EGD and Path reports, Ascitic fluid studies, Serologic workup of Cirrhosis, etc.  - Given concern for Type 1 HRS (unknown baseline), please dc diuretics until Cr stabilizes (patient is not overtly volume overloaded)  - Recommend diagnostic para to r/o SBP with IR  - Continue albumin challenge (day 2 20-40gm) - monitor for volume overload  - Avoid all NSAID's (nephrotoxic)    Thank you for the courtesy of this consult. We will follow along with you.    Ottoniel Almaraz M.D.  Gastroenterology Fellow  Pager: [phone] 50 yo M, PMH of EtOH use Disorder in remission, Cirrhosis (presumed EtOH) requiring serial paracentesis, active smoker, former obesity s/p bariatric surgery, DM, TENZIN, asthma, PUD, chronic back pain,  pw generalized weakness, fatigue, decreased appetite, and generalized abdominal discomfort.     GI was consulted for history of cirrhosis and elevated Cr    #Cirrhosis (presumed EtOH), MELD Na - 23-> 19 -> 16 (9/5/22)  #LINNETTE , unknown baseline, improving  #Small Ascites  #Normocytic Anemia    Recommendations:  - HRS is a diagnosis of exclusion - please trend Cr (via CMP) TID  - Appreciate Renal input  - f/u Abdominal Doppler (US w/ Doppler, CT was noncontrast)  - Please obtain records of cirrhosis workup from [facility] - EGD and Path reports, Ascitic fluid studies, Serologic workup of Cirrhosis, etc.  - Given concern for Type 1 HRS (unknown baseline), please dc diuretics until Cr stabilizes (patient is not overtly volume overloaded)  - Recommend diagnostic para to r/o SBP with IR  - Continue albumin challenge (day 2 20-40gm) - monitor for volume overload  - Avoid all NSAID's (nephrotoxic)    Thank you for the courtesy of this consult. We will follow along with you.    Ottoniel lAmaraz M.D.  Gastroenterology Fellow  Pager: [phone]

## 2022-09-05 NOTE — DISCHARGE NOTE PROVIDER - CARE PROVIDER_API CALL
Rima Olivares  [address] [place] [address]  Phone: [phone]  Fax: (   )    -  Scheduled Appointment: 09/13/2022 10:20 AM   Rima Olivares  [address] [place] [address]  Phone: [phone]  Fax: (   )    -  Scheduled Appointment: 09/13/2022 10:20 AM   Rima Olivares  [address] [place] [address]  Phone: [phone]  Fax: (   )    -  Scheduled Appointment: 09/13/2022 10:20 AM

## 2022-09-06 ENCOUNTER — TRANSCRIPTION ENCOUNTER (OUTPATIENT)
Age: 52
End: 2022-09-06

## 2022-09-06 VITALS
TEMPERATURE: 98 F | HEART RATE: 83 BPM | RESPIRATION RATE: 19 BRPM | OXYGEN SATURATION: 99 % | SYSTOLIC BLOOD PRESSURE: 106 MMHG | DIASTOLIC BLOOD PRESSURE: 71 MMHG

## 2022-09-06 LAB
-  AMIKACIN: SIGNIFICANT CHANGE UP
-  AMOXICILLIN/CLAVULANIC ACID: SIGNIFICANT CHANGE UP
-  AMPICILLIN/SULBACTAM: SIGNIFICANT CHANGE UP
-  AMPICILLIN: SIGNIFICANT CHANGE UP
-  AZTREONAM: SIGNIFICANT CHANGE UP
-  CEFAZOLIN: SIGNIFICANT CHANGE UP
-  CEFEPIME: SIGNIFICANT CHANGE UP
-  CEFTRIAXONE: SIGNIFICANT CHANGE UP
-  CIPROFLOXACIN: SIGNIFICANT CHANGE UP
-  ERTAPENEM: SIGNIFICANT CHANGE UP
-  GENTAMICIN: SIGNIFICANT CHANGE UP
-  IMIPENEM: SIGNIFICANT CHANGE UP
-  LEVOFLOXACIN: SIGNIFICANT CHANGE UP
-  MEROPENEM: SIGNIFICANT CHANGE UP
-  NITROFURANTOIN: SIGNIFICANT CHANGE UP
-  PIPERACILLIN/TAZOBACTAM: SIGNIFICANT CHANGE UP
-  TIGECYCLINE: SIGNIFICANT CHANGE UP
-  TOBRAMYCIN: SIGNIFICANT CHANGE UP
-  TRIMETHOPRIM/SULFAMETHOXAZOLE: SIGNIFICANT CHANGE UP
ALBUMIN SERPL ELPH-MCNC: 4 G/DL — SIGNIFICANT CHANGE UP (ref 3.3–5)
ALP SERPL-CCNC: 86 U/L — SIGNIFICANT CHANGE UP (ref 40–120)
ALT FLD-CCNC: 10 U/L — SIGNIFICANT CHANGE UP (ref 10–45)
ANION GAP SERPL CALC-SCNC: 9 MMOL/L — SIGNIFICANT CHANGE UP (ref 5–17)
AST SERPL-CCNC: 20 U/L — SIGNIFICANT CHANGE UP (ref 10–40)
BASOPHILS # BLD AUTO: 0.03 K/UL — SIGNIFICANT CHANGE UP (ref 0–0.2)
BASOPHILS NFR BLD AUTO: 0.6 % — SIGNIFICANT CHANGE UP (ref 0–2)
BILIRUB SERPL-MCNC: 0.7 MG/DL — SIGNIFICANT CHANGE UP (ref 0.2–1.2)
BUN SERPL-MCNC: 43 MG/DL — HIGH (ref 7–23)
CALCIUM SERPL-MCNC: 10.2 MG/DL — SIGNIFICANT CHANGE UP (ref 8.4–10.5)
CHLORIDE SERPL-SCNC: 104 MMOL/L — SIGNIFICANT CHANGE UP (ref 96–108)
CO2 SERPL-SCNC: 27 MMOL/L — SIGNIFICANT CHANGE UP (ref 22–31)
CREAT SERPL-MCNC: 1.22 MG/DL — SIGNIFICANT CHANGE UP (ref 0.5–1.3)
CULTURE RESULTS: SIGNIFICANT CHANGE UP
EGFR: 71 ML/MIN/1.73M2 — SIGNIFICANT CHANGE UP
EOSINOPHIL # BLD AUTO: 0.15 K/UL — SIGNIFICANT CHANGE UP (ref 0–0.5)
EOSINOPHIL NFR BLD AUTO: 3 % — SIGNIFICANT CHANGE UP (ref 0–6)
GLUCOSE BLDC GLUCOMTR-MCNC: 159 MG/DL — HIGH (ref 70–99)
GLUCOSE BLDC GLUCOMTR-MCNC: 93 MG/DL — SIGNIFICANT CHANGE UP (ref 70–99)
GLUCOSE SERPL-MCNC: 150 MG/DL — HIGH (ref 70–99)
HCT VFR BLD CALC: 28.9 % — LOW (ref 39–50)
HGB BLD-MCNC: 9 G/DL — LOW (ref 13–17)
IMM GRANULOCYTES NFR BLD AUTO: 0.2 % — SIGNIFICANT CHANGE UP (ref 0–1.5)
INR BLD: 1.31 — HIGH (ref 0.88–1.16)
LYMPHOCYTES # BLD AUTO: 1.15 K/UL — SIGNIFICANT CHANGE UP (ref 1–3.3)
LYMPHOCYTES # BLD AUTO: 23.1 % — SIGNIFICANT CHANGE UP (ref 13–44)
MAGNESIUM SERPL-MCNC: 1.6 MG/DL — SIGNIFICANT CHANGE UP (ref 1.6–2.6)
MCHC RBC-ENTMCNC: 27.5 PG — SIGNIFICANT CHANGE UP (ref 27–34)
MCHC RBC-ENTMCNC: 31.1 GM/DL — LOW (ref 32–36)
MCV RBC AUTO: 88.4 FL — SIGNIFICANT CHANGE UP (ref 80–100)
MELD SCORE WITH DIALYSIS: 23 POINTS — SIGNIFICANT CHANGE UP
MELD SCORE WITHOUT DIALYSIS: 11 POINTS — SIGNIFICANT CHANGE UP
METHOD TYPE: SIGNIFICANT CHANGE UP
MONOCYTES # BLD AUTO: 0.31 K/UL — SIGNIFICANT CHANGE UP (ref 0–0.9)
MONOCYTES NFR BLD AUTO: 6.2 % — SIGNIFICANT CHANGE UP (ref 2–14)
NEUTROPHILS # BLD AUTO: 3.32 K/UL — SIGNIFICANT CHANGE UP (ref 1.8–7.4)
NEUTROPHILS NFR BLD AUTO: 66.9 % — SIGNIFICANT CHANGE UP (ref 43–77)
NRBC # BLD: 0 /100 WBCS — SIGNIFICANT CHANGE UP (ref 0–0)
ORGANISM # SPEC MICROSCOPIC CNT: SIGNIFICANT CHANGE UP
PHOSPHATE SERPL-MCNC: 1.9 MG/DL — LOW (ref 2.5–4.5)
PLATELET # BLD AUTO: 103 K/UL — LOW (ref 150–400)
POTASSIUM SERPL-MCNC: 5 MMOL/L — SIGNIFICANT CHANGE UP (ref 3.5–5.3)
POTASSIUM SERPL-SCNC: 5 MMOL/L — SIGNIFICANT CHANGE UP (ref 3.5–5.3)
PROT SERPL-MCNC: 7.5 G/DL — SIGNIFICANT CHANGE UP (ref 6–8.3)
PROTHROM AB SERPL-ACNC: 15.6 SEC — HIGH (ref 10.5–13.4)
RBC # BLD: 3.27 M/UL — LOW (ref 4.2–5.8)
RBC # FLD: 15.4 % — HIGH (ref 10.3–14.5)
SODIUM SERPL-SCNC: 140 MMOL/L — SIGNIFICANT CHANGE UP (ref 135–145)
SPECIMEN SOURCE: SIGNIFICANT CHANGE UP
VIT B12 SERPL-MCNC: 1107 PG/ML — SIGNIFICANT CHANGE UP (ref 232–1245)
WBC # BLD: 4.97 K/UL — SIGNIFICANT CHANGE UP (ref 3.8–10.5)
WBC # FLD AUTO: 4.97 K/UL — SIGNIFICANT CHANGE UP (ref 3.8–10.5)

## 2022-09-06 PROCEDURE — 81001 URINALYSIS AUTO W/SCOPE: CPT

## 2022-09-06 PROCEDURE — 84300 ASSAY OF URINE SODIUM: CPT

## 2022-09-06 PROCEDURE — 71045 X-RAY EXAM CHEST 1 VIEW: CPT

## 2022-09-06 PROCEDURE — 86900 BLOOD TYPING SEROLOGIC ABO: CPT

## 2022-09-06 PROCEDURE — 84100 ASSAY OF PHOSPHORUS: CPT

## 2022-09-06 PROCEDURE — 83735 ASSAY OF MAGNESIUM: CPT

## 2022-09-06 PROCEDURE — 85730 THROMBOPLASTIN TIME PARTIAL: CPT

## 2022-09-06 PROCEDURE — 85025 COMPLETE CBC W/AUTO DIFF WBC: CPT

## 2022-09-06 PROCEDURE — 87635 SARS-COV-2 COVID-19 AMP PRB: CPT

## 2022-09-06 PROCEDURE — 93975 VASCULAR STUDY: CPT

## 2022-09-06 PROCEDURE — 93306 TTE W/DOPPLER COMPLETE: CPT | Mod: 26

## 2022-09-06 PROCEDURE — 99232 SBSQ HOSP IP/OBS MODERATE 35: CPT

## 2022-09-06 PROCEDURE — 74176 CT ABD & PELVIS W/O CONTRAST: CPT

## 2022-09-06 PROCEDURE — 86850 RBC ANTIBODY SCREEN: CPT

## 2022-09-06 PROCEDURE — 85610 PROTHROMBIN TIME: CPT

## 2022-09-06 PROCEDURE — 87086 URINE CULTURE/COLONY COUNT: CPT

## 2022-09-06 PROCEDURE — 82550 ASSAY OF CK (CPK): CPT

## 2022-09-06 PROCEDURE — 36415 COLL VENOUS BLD VENIPUNCTURE: CPT

## 2022-09-06 PROCEDURE — 94660 CPAP INITIATION&MGMT: CPT

## 2022-09-06 PROCEDURE — 80048 BASIC METABOLIC PNL TOTAL CA: CPT

## 2022-09-06 PROCEDURE — P9047: CPT

## 2022-09-06 PROCEDURE — 99233 SBSQ HOSP IP/OBS HIGH 50: CPT | Mod: GC

## 2022-09-06 PROCEDURE — 96375 TX/PRO/DX INJ NEW DRUG ADDON: CPT

## 2022-09-06 PROCEDURE — 83880 ASSAY OF NATRIURETIC PEPTIDE: CPT

## 2022-09-06 PROCEDURE — 82570 ASSAY OF URINE CREATININE: CPT

## 2022-09-06 PROCEDURE — 86901 BLOOD TYPING SEROLOGIC RH(D): CPT

## 2022-09-06 PROCEDURE — 83690 ASSAY OF LIPASE: CPT

## 2022-09-06 PROCEDURE — 84484 ASSAY OF TROPONIN QUANT: CPT

## 2022-09-06 PROCEDURE — 87186 SC STD MICRODIL/AGAR DIL: CPT

## 2022-09-06 PROCEDURE — 99285 EMERGENCY DEPT VISIT HI MDM: CPT

## 2022-09-06 PROCEDURE — 99447 NTRPROF PH1/NTRNET/EHR 11-20: CPT

## 2022-09-06 PROCEDURE — 80053 COMPREHEN METABOLIC PANEL: CPT

## 2022-09-06 PROCEDURE — 82607 VITAMIN B-12: CPT

## 2022-09-06 PROCEDURE — 82803 BLOOD GASES ANY COMBINATION: CPT

## 2022-09-06 PROCEDURE — 96374 THER/PROPH/DIAG INJ IV PUSH: CPT

## 2022-09-06 PROCEDURE — 80307 DRUG TEST PRSMV CHEM ANLYZR: CPT

## 2022-09-06 PROCEDURE — 83036 HEMOGLOBIN GLYCOSYLATED A1C: CPT

## 2022-09-06 PROCEDURE — 82962 GLUCOSE BLOOD TEST: CPT

## 2022-09-06 PROCEDURE — 84540 ASSAY OF URINE/UREA-N: CPT

## 2022-09-06 PROCEDURE — 83605 ASSAY OF LACTIC ACID: CPT

## 2022-09-06 PROCEDURE — 93306 TTE W/DOPPLER COMPLETE: CPT

## 2022-09-06 RX ORDER — LACTULOSE 10 G/15ML
30 SOLUTION ORAL
Qty: 2700 | Refills: 0
Start: 2022-09-06 | End: 2022-10-05

## 2022-09-06 RX ORDER — SODIUM,POTASSIUM PHOSPHATES 278-250MG
1 POWDER IN PACKET (EA) ORAL ONCE
Refills: 0 | Status: COMPLETED | OUTPATIENT
Start: 2022-09-06 | End: 2022-09-06

## 2022-09-06 RX ORDER — PREGABALIN 225 MG/1
1000 CAPSULE ORAL ONCE
Refills: 0 | Status: COMPLETED | OUTPATIENT
Start: 2022-09-06 | End: 2022-09-06

## 2022-09-06 RX ORDER — SUCRALFATE 1 G
1 TABLET ORAL
Qty: 120 | Refills: 0
Start: 2022-09-06 | End: 2022-10-05

## 2022-09-06 RX ORDER — GABAPENTIN 400 MG/1
1 CAPSULE ORAL
Qty: 90 | Refills: 0
Start: 2022-09-06 | End: 2022-10-05

## 2022-09-06 RX ORDER — FUROSEMIDE 40 MG
40 TABLET ORAL EVERY 24 HOURS
Refills: 0 | Status: DISCONTINUED | OUTPATIENT
Start: 2022-09-07 | End: 2022-09-06

## 2022-09-06 RX ORDER — ACAMPROSATE CALCIUM 333 MG/1
1 TABLET, DELAYED RELEASE ORAL
Qty: 90 | Refills: 0
Start: 2022-09-06 | End: 2022-10-05

## 2022-09-06 RX ORDER — FUROSEMIDE 40 MG
40 TABLET ORAL ONCE
Refills: 0 | Status: COMPLETED | OUTPATIENT
Start: 2022-09-06 | End: 2022-09-06

## 2022-09-06 RX ORDER — LACTULOSE 10 G/15ML
30 SOLUTION ORAL
Qty: 0 | Refills: 0 | DISCHARGE
Start: 2022-09-06

## 2022-09-06 RX ORDER — LACTULOSE 10 G/15ML
20 SOLUTION ORAL EVERY 8 HOURS
Refills: 0 | Status: DISCONTINUED | OUTPATIENT
Start: 2022-09-06 | End: 2022-09-06

## 2022-09-06 RX ADMIN — LACTULOSE 20 GRAM(S): 10 SOLUTION ORAL at 14:15

## 2022-09-06 RX ADMIN — PREGABALIN 1000 MICROGRAM(S): 225 CAPSULE ORAL at 14:15

## 2022-09-06 RX ADMIN — Medication 1 PATCH: at 12:21

## 2022-09-06 RX ADMIN — Medication 1 PATCH: at 06:40

## 2022-09-06 RX ADMIN — Medication 2: at 12:01

## 2022-09-06 RX ADMIN — GABAPENTIN 300 MILLIGRAM(S): 400 CAPSULE ORAL at 14:15

## 2022-09-06 RX ADMIN — Medication 1 GRAM(S): at 06:30

## 2022-09-06 RX ADMIN — GABAPENTIN 300 MILLIGRAM(S): 400 CAPSULE ORAL at 06:30

## 2022-09-06 RX ADMIN — Medication 50 MILLILITER(S): at 06:29

## 2022-09-06 RX ADMIN — Medication 1 PATCH: at 12:02

## 2022-09-06 RX ADMIN — PANTOPRAZOLE SODIUM 40 MILLIGRAM(S): 20 TABLET, DELAYED RELEASE ORAL at 06:31

## 2022-09-06 RX ADMIN — Medication 40 MILLIGRAM(S): at 14:27

## 2022-09-06 RX ADMIN — Medication 1 PACKET(S): at 15:15

## 2022-09-06 RX ADMIN — Medication 1 GRAM(S): at 12:01

## 2022-09-06 NOTE — PROGRESS NOTE ADULT - ASSESSMENT
50 yo M, PMH of EtOH use Disorder in remission, Cirrhosis (presumed EtOH) requiring serial paracentesis, active smoker, former obesity s/p bariatric surgery, DM, TENZIN, asthma, PUD, chronic back pain,  pw generalized weakness, fatigue, decreased appetite, and generalized abdominal discomfort. GI was consulted for history of cirrhosis and elevated Cr.    # Cirrhosis - MELD-Na: 11 (9/6/22)  HE: +asterixis  Ascites: small, but not enough to tap  HCC: none seen on CT or US, but noncon CT so ideally would obtain contrast study or MRI as outpatient  Varices: needs EGD  - significantly improved SCr following albumin challenge, will need outpatient optimization of diuretic regimen, would ideally restart in coming days if SCr remains stable   - Please obtain records of cirrhosis workup from [facility] - EGD and Path reports, Ascitic fluid studies, Serologic workup of Cirrhosis, etc.  - start lactulose for HE given asterixis on exam  - per IR, not enough ascites for para  - plan to follow up with Dr. Arthur Celis, [facility] hepatologist upon discharge for optimization     Nan West MD  PGY-6, Gastroenterology Fellow  pager: [phone] 52 yo M, PMH of EtOH use Disorder in remission, Cirrhosis (presumed EtOH) requiring serial paracentesis, active smoker, former obesity s/p bariatric surgery, DM, TENZIN, asthma, PUD, chronic back pain,  pw generalized weakness, fatigue, decreased appetite, and generalized abdominal discomfort. GI was consulted for history of cirrhosis and elevated Cr.    # Cirrhosis - MELD-Na: 11 (9/6/22)  HE: +asterixis  Ascites: small, but not enough to tap  HCC: none seen on CT or US, but noncon CT so ideally would obtain contrast study or MRI as outpatient  Varices: needs EGD  - significantly improved SCr following albumin challenge, will need outpatient optimization of diuretic regimen, would ideally restart in coming days if SCr remains stable   - Please obtain records of cirrhosis workup from [facility] - EGD and Path reports, Ascitic fluid studies, Serologic workup of Cirrhosis, etc.  - start lactulose for HE given asterixis on exam  - per IR, not enough ascites for para  - plan to follow up with Dr. Arthur Celis, [facility] hepatologist upon discharge for optimization     Nan West MD  PGY-6, Gastroenterology Fellow  pager: [phone] 52 yo M, PMH of EtOH use Disorder in remission, Cirrhosis (presumed EtOH) requiring serial paracentesis, active smoker, former obesity s/p bariatric surgery, DM, TENZIN, asthma, PUD, chronic back pain,  pw generalized weakness, fatigue, decreased appetite, and generalized abdominal discomfort. GI was consulted for history of cirrhosis and elevated Cr.    # Cirrhosis - MELD-Na: 11 (9/6/22)  HE: +asterixis  Ascites: small, but not enough to tap  HCC: none seen on CT or US, but noncon CT so ideally would obtain contrast study or MRI as outpatient  Varices: needs EGD  - significantly improved SCr following albumin challenge, will need outpatient optimization of diuretic regimen, would ideally restart in coming days if SCr remains stable   - Please obtain records of cirrhosis workup from [facility] - EGD and Path reports, Ascitic fluid studies, Serologic workup of Cirrhosis, etc.  - start lactulose for HE given asterixis on exam  - per IR, not enough ascites for para  - plan to follow up with Dr. Arthur Celis, [facility] hepatologist upon discharge for optimization     Nan West MD  PGY-6, Gastroenterology Fellow  pager: [phone]

## 2022-09-06 NOTE — DISCHARGE NOTE NURSING/CASE MANAGEMENT/SOCIAL WORK - NSDCPEFALRISK_GEN_ALL_CORE
For information on Fall & Injury Prevention, visit: [email or web address]-[email or web address] OR  [email or web address] OR  https://www.cdc.gov/steadi/patient.html For information on Fall & Injury Prevention, visit: https://www.[facility]/news/fall-[email or web address]-[email or web address]-[email or web address]-health-[email or web address]-mobility OR  https://www.[facility]/news/fall-prevention-tips-to-avoid-injury OR  https://www.cdc.gov/steadi/patient.html For information on Fall & Injury Prevention, visit: [email or web address]://www.[email or web address]/news/fall-prevention-protects-[email or web address]-maintains-health-[email or web address]-mobility OR  https://www.[email or web address]/news/fall-prevention-tips-to-avoid-injury OR  https://www.cdc.gov/steadi/patient.html

## 2022-09-06 NOTE — PROGRESS NOTE ADULT - PROBLEM SELECTOR PLAN 2
Pt has history of cirrhosis 2/2 significant alcohol abuse. Usually follows at [facility] where he gets paracentesis every few months, however due to insurance issues pt has not been able to follow-up at [facility] for several months. On exam, abdomen soft and distended. Pt afebrile with normal WBC count, no concern for SBP. Pt takes spironolactone 100 mg PO daily and lasix 40 mg PO daily for ascites.  MELD 16 this AM.  - If pt becomes febrile or has increasing WBC count, initiate antibiotic prophylaxis for SBP   - Pt will need paracentesis, but non-emergent currently, f/u w GI surrounding procedure  - Abdominal U/S w dopplers ordered, f/u results  - f/u TTE (low suspicion for CHF given normal serum BNP)  - GI following, appreciate recs  - Will attempt to get record from [facility]  - mild asterixis noted on exam, started on lactulose 20g q8hr. Pt has history of cirrhosis 2/2 significant alcohol abuse. Usually follows at [facility] where he gets paracentesis every few months, however due to insurance issues pt has not been able to follow-up at [facility] for several months. On exam, abdomen soft and distended. Pt afebrile with normal WBC count, no concern for SBP. Pt takes spironolactone 100 mg PO daily and lasix 40 mg PO daily for ascites.  MELD 16 this AM.  - If pt becomes febrile or has increasing WBC count, initiate antibiotic prophylaxis for SBP   - Pt will need paracentesis, but non-emergent currently, f/u w GI surrounding procedure  - Abdominal U/S w dopplers ordered, f/u results  - f/u TTE (low suspicion for CHF given normal serum BNP)  - GI following, appreciate recs  - Will attempt to get record from [facility]  - mild asterixis noted on exam, started on lactulose 20g q8hr. Pt has history of cirrhosis 2/2 significant alcohol abuse. Usually follows at [facility] where he gets paracentesis every few months, however due to insurance issues pt has not been able to follow-up at [facility] for several months. On exam, abdomen soft and distended. Pt afebrile with normal WBC count, no concern for SBP. Pt takes spironolactone 100 mg PO daily and lasix 40 mg PO daily for ascites.  MELD 16 this AM.  - If pt becomes febrile or has increasing WBC count, initiate antibiotic prophylaxis for SBP   - Pt will need paracentesis, but non-emergent currently, f/u w GI surrounding procedure  - Abdominal U/S w dopplers ordered, f/u results  - f/u TTE (low suspicion for CHF given normal serum BNP)  - GI following, appreciate recs  - Will attempt to get record from [facility]  - mild asterixis noted on exam, started on lactulose 20g q8hr.

## 2022-09-06 NOTE — PROGRESS NOTE ADULT - ATTENDING COMMENTS
51 year old male with a PMHx of prior alcohol abuse (sober for 6+ months), alcoholic cirrhosis, DMII, TENZIN, asthma, PUD and history of frequent paracentesis who presented with weakness, fatigue, poor PO intake and abdominal discomfort in the setting of medication/paracentesis non-compliance, found to have LINNETTE that was responsive to albumin challenge.     Plan:   -planned for diagnostic paracentesis with IR but there was no fluid pocket amenable to drainage, ok to hold off as there is low concern for SBP   -f/u results of RUQ US  -continue with lactulose  -hold lasix and spironolactone for now, will ask GI to comment but inclined to hold on discharge until outpatient follow up given borderline blood pressure  -LINNETTE resolved with albumin challenge, appreciate nephrology recommendations     Dispo: plan for d/c to shelter today or tomorrow     Rest of plan as per resident note.

## 2022-09-06 NOTE — CONSULT NOTE ADULT - ASSESSMENT
Assessment: 51y yo Male with PMH of ex-alcoholism (sober for 7 months), cirrhosis, smoking (35-40 pack year history), DM, TENZIN (no longer on CPAP due to insurance issues), asthma, PUD, abdominal hernia (per patient), chronic back pain, Hx of gastric bypass, and Hx of multiple paracentesis to abdomen who presents to the ED with generalized weakness, fatigue, decreased appetite, and generalized abdominal discomfort. IR consulted for diagnostic paracentesis. Case reviewed with Dr. Singh, minimal ascites not amenable to IR drainage. No plan for IR intervention at this time. Please reconsult as needed.        Communicated with: primary team Dr. Rosa

## 2022-09-06 NOTE — PROGRESS NOTE ADULT - PROBLEM SELECTOR PLAN 8
Pt has history of TENZIN and previously used CPAP, however he has not used CPAP for many years due to insurance issues  -  for help with obtaining CPAP at home
Pt has history of TENZIN and previously used CPAP, however he has not used CPAP for many years due to insurance issues  -  for help with obtaining CPAP at home

## 2022-09-06 NOTE — PROGRESS NOTE ADULT - PROBLEM SELECTOR PLAN 11
Pt has history of chronic back pain and takes gabapentin 300 mg PO TID  - c/w home medication while inpatient, no dose adjustment w current Cr Clearance >50 (52.4)  - Consider d/c if persistent LINNETTE/no improvement
Pt has history of chronic back pain and takes gabapentin 300 mg PO TID  - c/w home medication while inpatient, no dose adjustment w current Cr Clearance >50 (52.4)  - Consider d/c if persistent LINNETTE/no improvement

## 2022-09-06 NOTE — PROGRESS NOTE ADULT - ASSESSMENT
Pt is a 50 yo M w/ PMH ex-alcoholism, cirrhosis, smoking, DM, TENZIN, asthma, PUD, abdominal hernia (per patient), chronic back pain, Hx of gastric bypass, and Hx of multiple paracentesis to abdomen who presents to ED with generalized weakness, fatigue, decreased appetite, and generalized abdominal discomfort. Pt is a 52 yo M w/ PMH ex-alcoholism, cirrhosis, smoking, DM, TENZIN, asthma, PUD, abdominal hernia (per patient), chronic back pain, Hx of gastric bypass, and Hx of multiple paracentesis to abdomen who presents to ED with generalized weakness, fatigue, decreased appetite, and generalized abdominal discomfort.

## 2022-09-06 NOTE — PROGRESS NOTE ADULT - REASON FOR ADMISSION
Weakness, abdominal pain

## 2022-09-06 NOTE — PROGRESS NOTE ADULT - PROBLEM SELECTOR PLAN 10
Pt reports a history of morbid obesity (improved s/p gastric bypass) and diabetes (previously took metformin and insulin). He does not currently take any medications for his diabetes.  - f/u A1c  - [facility] inpatient Pt reports a history of morbid obesity (improved s/p gastric bypass) and diabetes (previously took metformin and insulin). He does not currently take any medications for his diabetes.  - f/u A1c  - [facility] inpatient Pt reports a history of morbid obesity (improved s/p gastric bypass) and diabetes (previously took metformin and insulin). He does not currently take any medications for his diabetes.  - f/u A1c  - [facility] inpatient

## 2022-09-06 NOTE — PROGRESS NOTE ADULT - PROBLEM SELECTOR PLAN 5
Pt has 35-40 pack year smoking history. He currently smokes 6-7 cigarettes/day, down from 2 packs/day at his peak  - Pt currently has nicotine patch 14 mg daily for withdrawal symptoms; can increase dosage as needed    #Abnormal BP in both arms:   On admission, found to have low BP in R arm (systolic 80s-90s) and normal BP in L arm (systolic high teens). Unclear reasoning at this time.
Pt has 35-40 pack year smoking history. He currently smokes 6-7 cigarettes/day, down from 2 packs/day at his peak  - Pt currently has nicotine patch 14 mg daily for withdrawal symptoms; can increase dosage as needed    #Abnormal BP in both arms:   On admission, found to have low BP in R arm (systolic 80s-90s) and normal BP in L arm (systolic high teens). Unclear reasoning at this time.  - F/u TTE

## 2022-09-06 NOTE — PROGRESS NOTE ADULT - ASSESSMENT
Patient is a 51y yo Male with PMH of ex-alcoholism (sober for 7 months), cirrhosis, smoking (35-40 pack year history), DM, chronic back pain, and Hx of multiple paracentesis to abdomen who presents to the ED with generalized weakness, fatigue, decreased appetite, and generalized abdominal discomfort. Pt was hypotensive on admission with BP 80/46. He received 1L NS bolus in the ambulance which did not improve his BP. On arrival to [facility], pt was mentating well, speaking in full sentences, and did not report any additional symptoms. Nephrology consulted for LINNETTE.       #LINNETTE on CKD. Patient unaware of his baseline Cr   likely etiology is pre-renal secondary to volume depletion and component of iATN given low BP and cocaine use  Unlikely HRS. No response to crystalloid fluid but did respond to albumin challenge   UA negative for proteinuria or hematuria  Admission Cr 3.41, improving, and now at 1.22    Recommend  Trend Daily BMP with magnesium and phos   Obtain documents from [facility]   Monitor Urine output   Strict IN and outs   continue to hold Diuretics   continue with albumin challenge    Restart home BP medications (lasix, lisinopril, HCTZ, spironolactone)  by tomorrow if BP remains stable. Can initiate one at a time to avoid hypotension  Avoid NSAIDS   Renally dose medications       Randall Lim M.D  PGY-4 Nephrology   [phone] Patient is a 51y yo Male with PMH of ex-alcoholism (sober for 7 months), cirrhosis, smoking (35-40 pack year history), DM, chronic back pain, and Hx of multiple paracentesis to abdomen who presents to the ED with generalized weakness, fatigue, decreased appetite, and generalized abdominal discomfort. Pt was hypotensive on admission with BP 80/46. He received 1L NS bolus in the ambulance which did not improve his BP. On arrival to [facility], pt was mentating well, speaking in full sentences, and did not report any additional symptoms. Nephrology consulted for LINNETTE.       #LINNETTE on CKD. Patient unaware of his baseline Cr   likely etiology is pre-renal secondary to volume depletion and component of iATN given low BP and cocaine use  Unlikely HRS. No response to crystalloid fluid but did respond to albumin challenge   UA negative for proteinuria or hematuria  Admission Cr 3.41, improving, and now at 1.22    Recommend  Trend Daily BMP with magnesium and phos   Obtain documents from [facility]   Monitor Urine output   Strict IN and outs   continue to hold Diuretics   continue with albumin challenge    Restart home BP medications (lasix, lisinopril, HCTZ, spironolactone)  by tomorrow if BP remains stable. Can initiate one at a time to avoid hypotension  Avoid NSAIDS   Renally dose medications       Randall Lim M.D  PGY-4 Nephrology   [phone] Patient is a 51y yo Male with PMH of ex-alcoholism (sober for 7 months), cirrhosis, smoking (35-40 pack year history), DM, chronic back pain, and Hx of multiple paracentesis to abdomen who presents to the ED with generalized weakness, fatigue, decreased appetite, and generalized abdominal discomfort. Pt was hypotensive on admission with BP 80/46. He received 1L NS bolus in the ambulance which did not improve his BP. On arrival to [facility], pt was mentating well, speaking in full sentences, and did not report any additional symptoms. Nephrology consulted for LINNETTE.       #LINNETTE on CKD. Patient unaware of his baseline Cr   likely etiology is pre-renal secondary to volume depletion and component of iATN given low BP and cocaine use  Unlikely HRS. No response to crystalloid fluid but did respond to albumin challenge   UA negative for proteinuria or hematuria  Admission Cr 3.41, improving, and now at 1.22    Recommend  Trend Daily BMP with magnesium and phos   Obtain documents from [facility]   Monitor Urine output   Strict IN and outs   continue to hold Diuretics   continue with albumin challenge    Restart home BP medications (lasix, lisinopril, HCTZ, spironolactone)  by tomorrow if BP remains stable. Can initiate one at a time to avoid hypotension  Avoid NSAIDS   Renally dose medications       Randall Lim M.D  PGY-4 Nephrology   [phone]

## 2022-09-06 NOTE — PROGRESS NOTE ADULT - PROBLEM SELECTOR PLAN 1
While being transported from [facility] to [facility], pt became hypotensive with BP 80/46. He received 1L NS bolus in the ambulance which did not improve his BP. Pt is afebrile, mentating well, speaking in full sentences, with no evidence of sepsis or bleeding. Concern for hepatorenal syndrome given rising creatinine i/s/o cirrhosis. Pt w stable blood pressures ON, /65.  - GI following, currently w albumin challenge, Cr and BP improved, less likely HRS.  - If BP remains low after additional fluids, re-check in other arm. If BP low in both arms, or pt becomes more hemodynamically unstable, low threshold for ICU consult   - Nephrology following, appreciate recs  - IR consulted for diagnostic tap, recommended no tap because there isn't enough fluid, no leukocytosis While being transported from [facility] to [facility], pt became hypotensive with BP 80/46. He received 1L NS bolus in the ambulance which did not improve his BP. Pt is afebrile, mentating well, speaking in full sentences, with no evidence of sepsis or bleeding. Concern for hepatorenal syndrome given rising creatinine i/s/o cirrhosis. Pt w stable blood pressures ON, /65.  - GI following, currently w albumin challenge, Cr and BP improved, less likely HRS.  - If BP remains low after additional fluids, re-check in other arm. If BP low in both arms, or pt becomes more hemodynamically unstable, low threshold for ICU consult   - Nephrology following, appreciate recs  - IR consulted for diagnostic tap, recommended no tap because there isn't enough fluid, no leukocytosis While being transported from [facility] to [facility], pt became hypotensive with BP 80/46. He received 1L NS bolus in the ambulance which did not improve his BP. Pt is afebrile, mentating well, speaking in full sentences, with no evidence of sepsis or bleeding. Concern for hepatorenal syndrome given rising creatinine i/s/o cirrhosis. Pt w stable blood pressures ON, /65.  - GI following, currently w albumin challenge, Cr and BP improved, less likely HRS.  - If BP remains low after additional fluids, re-check in other arm. If BP low in both arms, or pt becomes more hemodynamically unstable, low threshold for ICU consult   - Nephrology following, appreciate recs  - IR consulted for diagnostic tap, recommended no tap because there isn't enough fluid, no leukocytosis

## 2022-09-06 NOTE — CONSULT NOTE ADULT - SUBJECTIVE AND OBJECTIVE BOX
51y yo Male with PMH of ex-alcoholism (sober for 7 months), cirrhosis, smoking (35-40 pack year history), DM, TENZIN (no longer on CPAP due to insurance issues), asthma, PUD, abdominal hernia (per patient), chronic back pain, Hx of gastric bypass, and Hx of multiple paracentesis to abdomen who presents to the ED with generalized weakness, fatigue, decreased appetite, and generalized abdominal discomfort. Pt usually follows at [facility] where he gets his medication refills and a paracentesis every few months. Recently he has been having insurance issues which have prevented him from following up at [facility] or refilling his medications. He denies fever, chills, headache, dizziness, lightheadedness, vision changes, chest pain, palpitations, SOB, cough, n/v, diarrhea, constipation, melena, hematochezia, dysuria, hematuria, urinary frequency/urgency, lower extremity edema, numbness, or tingling. Pt afebrile, normal WBC count. IR consulted for diagnostic paracentesis.     Clinical History: ABDOMINAL PAIN    FH: kidney disease    FH: diabetes mellitus    Handoff    MEWS Score    Diabetes mellitus    Alcohol abuse    Smoking    TENZIN (obstructive sleep apnea)    Asthma    Peptic ulcer    Peptic ulcer disease    Cirrhosis    LINNETTE (acute kidney injury)    Diabetes mellitus    Alcohol abuse    Smoking    TENZIN (obstructive sleep apnea)    Peptic ulcer disease    Asthma    Cirrhosis    Hypotension    Prophylactic measure    HTN (hypertension)    Problem related to housing and economic circumstances    LINNETTE (acute kidney injury)    Chronic back pain    Acute renal failure    H/O gastric bypass    ABDOMINAL PAIN    Asthma    Peptic ulcer disease    SysAdmin_VisitLink        Meds:acetaminophen     Tablet .. 650 milliGRAM(s) Oral every 6 hours PRN  albuterol/ipratropium for Nebulization 3 milliLiter(s) Nebulizer every 6 hours PRN  dextrose 5%. 1000 milliLiter(s) IV Continuous <Continuous>  dextrose 5%. 1000 milliLiter(s) IV Continuous <Continuous>  dextrose 50% Injectable 25 Gram(s) IV Push once  dextrose 50% Injectable 12.5 Gram(s) IV Push once  dextrose 50% Injectable 25 Gram(s) IV Push once  dextrose Oral Gel 15 Gram(s) Oral once PRN  gabapentin 300 milliGRAM(s) Oral three times a day  glucagon  Injectable 1 milliGRAM(s) IntraMuscular once  influenza   Vaccine 0.5 milliLiter(s) IntraMuscular once  insulin lispro (ADMELOG) corrective regimen sliding scale   SubCutaneous Before meals and at bedtime  nicotine -  14 mG/24Hr(s) Patch 1 Patch Transdermal daily  pantoprazole    Tablet 40 milliGRAM(s) Oral before breakfast  sucralfate 1 Gram(s) Oral four times a day      Allergies:No Known Allergies        Labs:                           9.0    4.97  )-----------( 103      ( 06 Sep 2022 05:30 )             28.9     PT/INR - ( 06 Sep 2022 05:30 )   PT: 15.6 sec;   INR: 1.31          PTT - ( 05 Sep 2022 06:03 )  PTT:34.3 sec  09-06    140  |  104  |  43<H>  ----------------------------<  150<H>  5.0   |  27  |  1.22    Ca    10.2      06 Sep 2022 05:30  Phos  1.9     09-06  Mg     1.6     09-06    TPro  7.5  /  Alb  4.0  /  TBili  0.7  /  DBili  x   /  AST  20  /  ALT  10  /  AlkPhos  86  09-06          Imaging Findings: reviewed, minimal ascites     51y yo Male with PMH of ex-alcoholism (sober for 7 months), cirrhosis, smoking (35-40 pack year history), DM, TENZIN (no longer on CPAP due to insurance issues), asthma, PUD, abdominal hernia (per patient), chronic back pain, Hx of gastric bypass, and Hx of multiple paracentesis to abdomen who presents to the ED with generalized weakness, fatigue, decreased appetite, and generalized abdominal discomfort. Pt usually follows at [facility] where he gets his medication refills and a paracentesis every few months. Recently he has been having insurance issues which have prevented him from following up at [facility] or refilling his medications. He denies fever, chills, headache, dizziness, lightheadedness, vision changes, chest pain, palpitations, SOB, cough, n/v, diarrhea, constipation, melena, hematochezia, dysuria, hematuria, urinary frequency/urgency, lower extremity edema, numbness, or tingling. Pt afebrile, normal WBC count. IR consulted for diagnostic paracentesis.     Clinical History: ABDOMINAL PAIN    FH: kidney disease    FH: diabetes mellitus    Handoff    MEWS Score    Diabetes mellitus    Alcohol abuse    Smoking    TENZIN (obstructive sleep apnea)    Asthma    Peptic ulcer    Peptic ulcer disease    Cirrhosis    LINNETTE (acute kidney injury)    Diabetes mellitus    Alcohol abuse    Smoking    TENZIN (obstructive sleep apnea)    Peptic ulcer disease    Asthma    Cirrhosis    Hypotension    Prophylactic measure    HTN (hypertension)    Problem related to housing and economic circumstances    LINNETTE (acute kidney injury)    Chronic back pain    Acute renal failure    H/O gastric bypass    ABDOMINAL PAIN    Asthma    Peptic ulcer disease    SysAdmin_VisitLink        Meds:acetaminophen     Tablet .. 650 milliGRAM(s) Oral every 6 hours PRN  albuterol/ipratropium for Nebulization 3 milliLiter(s) Nebulizer every 6 hours PRN  dextrose 5%. 1000 milliLiter(s) IV Continuous <Continuous>  dextrose 5%. 1000 milliLiter(s) IV Continuous <Continuous>  dextrose 50% Injectable 25 Gram(s) IV Push once  dextrose 50% Injectable 12.5 Gram(s) IV Push once  dextrose 50% Injectable 25 Gram(s) IV Push once  dextrose Oral Gel 15 Gram(s) Oral once PRN  gabapentin 300 milliGRAM(s) Oral three times a day  glucagon  Injectable 1 milliGRAM(s) IntraMuscular once  influenza   Vaccine 0.5 milliLiter(s) IntraMuscular once  insulin lispro (ADMELOG) corrective regimen sliding scale   SubCutaneous Before meals and at bedtime  nicotine -  14 mG/24Hr(s) Patch 1 Patch Transdermal daily  pantoprazole    Tablet 40 milliGRAM(s) Oral before breakfast  sucralfate 1 Gram(s) Oral four times a day      Allergies:No Known Allergies        Labs:                           9.0    4.97  )-----------( 103      ( 06 Sep 2022 05:30 )             28.9     PT/INR - ( 06 Sep 2022 05:30 )   PT: 15.6 sec;   INR: 1.31          PTT - ( 05 Sep 2022 06:03 )  PTT:34.3 sec  09-06    140  |  104  |  43<H>  ----------------------------<  150<H>  5.0   |  27  |  1.22    Ca    10.2      06 Sep 2022 05:30  Phos  1.9     09-06  Mg     1.6     09-06    TPro  7.5  /  Alb  4.0  /  TBili  0.7  /  DBili  x   /  AST  20  /  ALT  10  /  AlkPhos  86  09-06          Imaging Findings: reviewed, minimal ascites     51y yo Male with PMH of ex-alcoholism (sober for 7 months), cirrhosis, smoking (35-40 pack year history), DM, TENZIN (no longer on CPAP due to insurance issues), asthma, PUD, abdominal hernia (per patient), chronic back pain, Hx of gastric bypass, and Hx of multiple paracentesis to abdomen who presents to the ED with generalized weakness, fatigue, decreased appetite, and generalized abdominal discomfort. Pt usually follows at [facility] where he gets his medication refills and a paracentesis every few months. Recently he has been having insurance issues which have prevented him from following up at [facility] or refilling his medications. He denies fever, chills, headache, dizziness, lightheadedness, vision changes, chest pain, palpitations, SOB, cough, n/v, diarrhea, constipation, melena, hematochezia, dysuria, hematuria, urinary frequency/urgency, lower extremity edema, numbness, or tingling. Pt afebrile, normal WBC count. IR consulted for diagnostic paracentesis.     Clinical History: ABDOMINAL PAIN    FH: kidney disease    FH: diabetes mellitus    Handoff    MEWS Score    Diabetes mellitus    Alcohol abuse    Smoking    TENZIN (obstructive sleep apnea)    Asthma    Peptic ulcer    Peptic ulcer disease    Cirrhosis    LINNETTE (acute kidney injury)    Diabetes mellitus    Alcohol abuse    Smoking    TENZIN (obstructive sleep apnea)    Peptic ulcer disease    Asthma    Cirrhosis    Hypotension    Prophylactic measure    HTN (hypertension)    Problem related to housing and economic circumstances    LINNETTE (acute kidney injury)    Chronic back pain    Acute renal failure    H/O gastric bypass    ABDOMINAL PAIN    Asthma    Peptic ulcer disease    SysAdmin_VisitLink        Meds:acetaminophen     Tablet .. 650 milliGRAM(s) Oral every 6 hours PRN  albuterol/ipratropium for Nebulization 3 milliLiter(s) Nebulizer every 6 hours PRN  dextrose 5%. 1000 milliLiter(s) IV Continuous <Continuous>  dextrose 5%. 1000 milliLiter(s) IV Continuous <Continuous>  dextrose 50% Injectable 25 Gram(s) IV Push once  dextrose 50% Injectable 12.5 Gram(s) IV Push once  dextrose 50% Injectable 25 Gram(s) IV Push once  dextrose Oral Gel 15 Gram(s) Oral once PRN  gabapentin 300 milliGRAM(s) Oral three times a day  glucagon  Injectable 1 milliGRAM(s) IntraMuscular once  influenza   Vaccine 0.5 milliLiter(s) IntraMuscular once  insulin lispro (ADMELOG) corrective regimen sliding scale   SubCutaneous Before meals and at bedtime  nicotine -  14 mG/24Hr(s) Patch 1 Patch Transdermal daily  pantoprazole    Tablet 40 milliGRAM(s) Oral before breakfast  sucralfate 1 Gram(s) Oral four times a day      Allergies:No Known Allergies        Labs:                           9.0    4.97  )-----------( 103      ( 06 Sep 2022 05:30 )             28.9     PT/INR - ( 06 Sep 2022 05:30 )   PT: 15.6 sec;   INR: 1.31          PTT - ( 05 Sep 2022 06:03 )  PTT:34.3 sec  09-06    140  |  104  |  43<H>  ----------------------------<  150<H>  5.0   |  27  |  1.22    Ca    10.2      06 Sep 2022 05:30  Phos  1.9     09-06  Mg     1.6     09-06    TPro  7.5  /  Alb  4.0  /  TBili  0.7  /  DBili  x   /  AST  20  /  ALT  10  /  AlkPhos  86  09-06          Imaging Findings: reviewed, minimal ascites

## 2022-09-06 NOTE — PROGRESS NOTE ADULT - PROBLEM SELECTOR PLAN 12
Pt is currently homeless and living in a shelter. He typically follows at [facility] but has been unable to obtain medical care or medication refills due to insurance issues   -  consult    - DVT ppx: SCD's (Improve score 0)   - GI ppx: Protonix   - Fluids: s/p 2.5 L NS, 100 albumin  - Electrolytes: replete as needed   - Diet: Dash/CC Pt is currently homeless and living in a shelter. He typically follows at [facility] but has been unable to obtain medical care or medication refills due to insurance issues   -  consult    - DVT ppx: SCD's (Improve score 0)   - GI ppx: Protonix   - Fluids: s/p 2.5 L NS, 100 albumin  - Electrolytes: replete as needed   - Diet: Dash/CC Pt is currently homeless and living in a shelter. He typically follows at [facility] but has been unable to obtain medical care or medication refills due to insurance issues   -  consult    - DVT ppx: SCD's (Improve score 0)   - GI ppx: Protonix   - Fluids: s/p 2.5 L NS, 100 albumin  - Electrolytes: replete as needed   - Diet: Dash/CC

## 2022-09-06 NOTE — PROGRESS NOTE ADULT - PROBLEM SELECTOR PLAN 3
Pt has elevated Cr on admission with unknown baseline. Per pt, he has no history of kidney disease. Renal failure possibly in setting of ATN vs. hepatorenal syndrome and/or dehydration as patient reports decreased PO intake recently. Currently s/p 2L IV fluids and 100 cc albumin. Good UOP. Cr 1.97 this AM, stable.  - Nephrology following, urine Na, Cr, urea ordered  - Renally dose medications  - Avoid nephrotoxic agents such as NSAIDs  - held lasix and spironolactone i/s/o LINNETTE  - Monitor I/Os
Pt has elevated Cr on admission with unknown baseline. Per pt, he has no history of kidney disease. Renal failure possibly in setting of ATN vs. hepatorenal syndrome and/or dehydration as patient reports decreased PO intake recently. Currently s/p 2L IV fluids and 100 cc albumin. Good UOP. Cr 1.22 this AM  - Renally dose medications  - Avoid nephrotoxic agents such as NSAIDs  - held lasix and spironolactone i/s/o LINNETTE  - Monitor I/Os

## 2022-09-06 NOTE — PROGRESS NOTE ADULT - PROBLEM SELECTOR PLAN 7
Pt has no signs or symptoms of respiratory distress. No wheezing on exam.  - Duo nebs q6h PRN for wheezing or SOB  - Monitor respiratory status
Pt has no signs or symptoms of respiratory distress. No wheezing on exam.  - Duo nebs q6h PRN for wheezing or SOB  - Monitor respiratory status

## 2022-09-06 NOTE — PROGRESS NOTE ADULT - SUBJECTIVE AND OBJECTIVE BOX
Patient is a 51y Male seen and evaluated at bedside. No acute events overnight. Sitting up in chair. Denies any new symptoms. IR consulted for paracentesis. Cr improving.      Meds:    acetaminophen     Tablet .. 650 every 6 hours PRN  albuterol/ipratropium for Nebulization 3 every 6 hours PRN  dextrose 5%. 1000 <Continuous>  dextrose 5%. 1000 <Continuous>  dextrose 50% Injectable 25 once  dextrose 50% Injectable 12.5 once  dextrose 50% Injectable 25 once  dextrose Oral Gel 15 once PRN  furosemide    Tablet 40 once  gabapentin 300 three times a day  glucagon  Injectable 1 once  influenza   Vaccine 0.5 once  insulin lispro (ADMELOG) corrective regimen sliding scale  Before meals and at bedtime  lactulose Syrup 20 every 8 hours  nicotine -  14 mG/24Hr(s) Patch 1 daily  pantoprazole    Tablet 40 before breakfast  sucralfate 1 four times a day      T(C): , Max: 37.1 (09-05-22 @ 20:39)  T(F): , Max: 98.8 (09-05-22 @ 20:39)  HR: 95 (09-06-22 @ 09:28)  BP: 107/71 (09-06-22 @ 09:28)  BP(mean): --  RR: 18 (09-06-22 @ 09:28)  SpO2: 100% (09-06-22 @ 09:28)  Wt(kg): --    09-05 @ 07:01  -  09-06 @ 07:00  --------------------------------------------------------  IN: 100 mL / OUT: 0 mL / NET: 100 mL          Review of Systems:  ROS negative except as per HPI      PHYSICAL EXAM:  Constitutional: NAD, sitting up in chiar   HEENT: NC/AT, EOMI  Neck: Supple, no JVD  Respiratory: CTA B/L, No w/r/r, on room air   Cardiovascular: RRR, normal S1 and S2, no m/r/g.   Gastrointestinal: +BS, soft, mildly distended, no fluid wave, tender to deep palpitation RLQ, no guarding or rebound tenderness, no palpable masses   Extremities: wwp; no cyanosis, clubbing or edema, no edema   Neurological: AAOx3, no focal neurological deficits    LABS:                        9.0    4.97  )-----------( 103      ( 06 Sep 2022 05:30 )             28.9     09-06    140  |  104  |  43<H>  ----------------------------<  150<H>  5.0   |  27  |  1.22    Ca    10.2      06 Sep 2022 05:30  Phos  1.9     09-06  Mg     1.6     09-06    TPro  7.5  /  Alb  4.0  /  TBili  0.7  /  DBili  x   /  AST  20  /  ALT  10  /  AlkPhos  86  09-06      PT/INR - ( 06 Sep 2022 05:30 )   PT: 15.6 sec;   INR: 1.31          PTT - ( 05 Sep 2022 06:03 )  PTT:34.3 sec    Sodium, Random Urine: 54 mmol/L (09-05 @ 10:44)  Creatinine, Random Urine: 58 mg/dL (09-05 @ 10:44)        RADIOLOGY & ADDITIONAL STUDIES:          
GASTROENTEROLOGY PROGRESS NOTE  Patient seen and examined at bedside. Feels well, likes to catch up on sleep while in the hospital. Denies new complaints, urinating well    PERTINENT REVIEW OF SYSTEMS:  CONSTITUTIONAL: No weakness, fevers or chills  HEENT: No visual changes; No vertigo or throat pain   GASTROINTESTINAL: As above.  NEUROLOGICAL: No numbness or weakness  SKIN: No itching, burning, rashes, or lesions     Allergies    No Known Allergies    Intolerances      MEDICATIONS:  MEDICATIONS  (STANDING):  albumin human 25% IVPB 100 milliLiter(s) IV Intermittent once  dextrose 5%. 1000 milliLiter(s) (50 mL/Hr) IV Continuous <Continuous>  dextrose 5%. 1000 milliLiter(s) (100 mL/Hr) IV Continuous <Continuous>  dextrose 50% Injectable 25 Gram(s) IV Push once  dextrose 50% Injectable 12.5 Gram(s) IV Push once  dextrose 50% Injectable 25 Gram(s) IV Push once  gabapentin 300 milliGRAM(s) Oral three times a day  glucagon  Injectable 1 milliGRAM(s) IntraMuscular once  influenza   Vaccine 0.5 milliLiter(s) IntraMuscular once  insulin lispro (ADMELOG) corrective regimen sliding scale   SubCutaneous Before meals and at bedtime  nicotine -  14 mG/24Hr(s) Patch 1 Patch Transdermal daily  pantoprazole    Tablet 40 milliGRAM(s) Oral before breakfast  sucralfate 1 Gram(s) Oral four times a day    MEDICATIONS  (PRN):  acetaminophen     Tablet .. 650 milliGRAM(s) Oral every 6 hours PRN Temp greater or equal to 38C (100.4F), Mild Pain (1 - 3)  albuterol/ipratropium for Nebulization 3 milliLiter(s) Nebulizer every 6 hours PRN Shortness of Breath and/or Wheezing  dextrose Oral Gel 15 Gram(s) Oral once PRN Blood Glucose LESS THAN 70 milliGRAM(s)/deciliter    Vital Signs Last 24 Hrs  T(C): 36.4 (05 Sep 2022 09:00), Max: 36.9 (04 Sep 2022 15:24)  T(F): 97.6 (05 Sep 2022 09:00), Max: 98.5 (04 Sep 2022 15:24)  HR: 85 (05 Sep 2022 09:00) (82 - 95)  BP: 111/61 (05 Sep 2022 09:00) (95/63 - 112/59)  BP(mean): --  RR: 17 (05 Sep 2022 09:00) (17 - 19)  SpO2: 100% (05 Sep 2022 09:00) (98% - 100%)    Parameters below as of 05 Sep 2022 09:00  Patient On (Oxygen Delivery Method): room air         @ 07:01  -   @ 07:00  --------------------------------------------------------  IN: 2100 mL / OUT: 600 mL / NET: 1500 mL     @ 07:  -   @ 13:50  --------------------------------------------------------  IN: 100 mL / OUT: 0 mL / NET: 100 mL      PHYSICAL EXAM:    General: lying in bed, in no acute distress  HEENT: MMM, conjunctiva and sclera clear  Gastrointestinal: Soft non-tender non-distended; No rebound or guarding  Skin: Warm and dry. No obvious rash  Neuro: no asterixis    LABS:                        9.1    4.60  )-----------( 113      ( 05 Sep 2022 06:03 )             29.3     09-05    141  |  106  |  70<H>  ----------------------------<  85  5.1   |  25  |  1.97<H>    Ca    10.1      05 Sep 2022 06:03  Phos  3.4     09-05  Mg     2.0     09-05    TPro  7.8  /  Alb  4.0  /  TBili  0.6  /  DBili  x   /  AST  18  /  ALT  11  /  AlkPhos  85  09-05    PT/INR - ( 05 Sep 2022 06:03 )   PT: 15.5 sec;   INR: 1.30          PTT - ( 05 Sep 2022 06:03 )  PTT:34.3 sec      Urinalysis Basic - ( 04 Sep 2022 06:17 )    Color: Yellow / Appearance: Clear / S.015 / pH: x  Gluc: x / Ketone: NEGATIVE  / Bili: NEGATIVE / Urobili: 0.2 E.U./dL   Blood: x / Protein: NEGATIVE mg/dL / Nitrite: NEGATIVE   Leuk Esterase: Trace / RBC: < 5 /HPF / WBC Many /HPF   Sq Epi: x / Non Sq Epi: 5-10 /HPF / Bacteria: Many /HPF                RADIOLOGY & ADDITIONAL STUDIES:  Reviewed
GASTROENTEROLOGY PROGRESS NOTE  Patient seen and examined at bedside. No acute complaints.     PERTINENT REVIEW OF SYSTEMS:  CONSTITUTIONAL: No weakness, fevers or chills  HEENT: No visual changes; No vertigo or throat pain   GASTROINTESTINAL: As above.  NEUROLOGICAL: No numbness or weakness  SKIN: No itching, burning, rashes, or lesions     Allergies    No Known Allergies    Intolerances      MEDICATIONS:  MEDICATIONS  (STANDING):  dextrose 5%. 1000 milliLiter(s) (50 mL/Hr) IV Continuous <Continuous>  dextrose 5%. 1000 milliLiter(s) (100 mL/Hr) IV Continuous <Continuous>  dextrose 50% Injectable 25 Gram(s) IV Push once  dextrose 50% Injectable 12.5 Gram(s) IV Push once  dextrose 50% Injectable 25 Gram(s) IV Push once  gabapentin 300 milliGRAM(s) Oral three times a day  glucagon  Injectable 1 milliGRAM(s) IntraMuscular once  influenza   Vaccine 0.5 milliLiter(s) IntraMuscular once  insulin lispro (ADMELOG) corrective regimen sliding scale   SubCutaneous Before meals and at bedtime  lactulose Syrup 20 Gram(s) Oral every 8 hours  nicotine -  14 mG/24Hr(s) Patch 1 Patch Transdermal daily  pantoprazole    Tablet 40 milliGRAM(s) Oral before breakfast  sucralfate 1 Gram(s) Oral four times a day    MEDICATIONS  (PRN):  acetaminophen     Tablet .. 650 milliGRAM(s) Oral every 6 hours PRN Temp greater or equal to 38C (100.4F), Mild Pain (1 - 3)  albuterol/ipratropium for Nebulization 3 milliLiter(s) Nebulizer every 6 hours PRN Shortness of Breath and/or Wheezing  dextrose Oral Gel 15 Gram(s) Oral once PRN Blood Glucose LESS THAN 70 milliGRAM(s)/deciliter    Vital Signs Last 24 Hrs  T(C): 37.1 (06 Sep 2022 09:28), Max: 37.1 (05 Sep 2022 20:39)  T(F): 98.7 (06 Sep 2022 09:28), Max: 98.8 (05 Sep 2022 20:39)  HR: 95 (06 Sep 2022 09:28) (84 - 99)  BP: 105/61 (06 Sep 2022 14:27) (105/61 - 112/67)  BP(mean): --  RR: 18 (06 Sep 2022 09:28) (12 - 18)  SpO2: 100% (06 Sep 2022 09:28) (97% - 100%)    Parameters below as of 06 Sep 2022 09:28  Patient On (Oxygen Delivery Method): room air        09-05 @ 07:01  -  09-06 @ 07:00  --------------------------------------------------------  IN: 100 mL / OUT: 0 mL / NET: 100 mL      PHYSICAL EXAM:    General: in no acute distress  HEENT: MMM, conjunctiva and sclera clear  Gastrointestinal: Soft non-tender non-distended; No rebound or guarding; +asterixis on exam  Skin: Warm and dry. No obvious rash    LABS:                        9.0    4.97  )-----------( 103      ( 06 Sep 2022 05:30 )             28.9     09-06    140  |  104  |  43<H>  ----------------------------<  150<H>  5.0   |  27  |  1.22    Ca    10.2      06 Sep 2022 05:30  Phos  1.9     09-06  Mg     1.6     09-06    TPro  7.5  /  Alb  4.0  /  TBili  0.7  /  DBili  x   /  AST  20  /  ALT  10  /  AlkPhos  86  09-06    PT/INR - ( 06 Sep 2022 05:30 )   PT: 15.6 sec;   INR: 1.31          PTT - ( 05 Sep 2022 06:03 )  PTT:34.3 sec                  Culture - Urine (collected 04 Sep 2022 06:17)  Source: Clean Catch Clean Catch (Midstream)  Preliminary Report (05 Sep 2022 14:15):    >100,000 CFU/ml Escherichia coli      RADIOLOGY & ADDITIONAL STUDIES:  Reviewed
O/N Events: no acute events overnight.     Subjective/ROS: Patient seen and examined at bedside.     Denies Fever/Chills, HA, CP, SOB, n/v, changes in bowel/urinary habits.  12pt ROS otherwise negative.    VITALS  Vital Signs Last 24 Hrs  T(C): 37.1 (06 Sep 2022 09:28), Max: 37.1 (05 Sep 2022 20:39)  T(F): 98.7 (06 Sep 2022 09:28), Max: 98.8 (05 Sep 2022 20:39)  HR: 95 (06 Sep 2022 09:28) (84 - 99)  BP: 107/71 (06 Sep 2022 09:28) (107/71 - 112/67)  BP(mean): --  RR: 18 (06 Sep 2022 09:28) (12 - 18)  SpO2: 100% (06 Sep 2022 09:28) (97% - 100%)    Parameters below as of 06 Sep 2022 09:28  Patient On (Oxygen Delivery Method): room air        CAPILLARY BLOOD GLUCOSE      POCT Blood Glucose.: 159 mg/dL (06 Sep 2022 11:42)  POCT Blood Glucose.: 93 mg/dL (06 Sep 2022 08:06)  POCT Blood Glucose.: 187 mg/dL (05 Sep 2022 22:21)  POCT Blood Glucose.: 129 mg/dL (05 Sep 2022 17:28)  POCT Blood Glucose.: 155 mg/dL (05 Sep 2022 12:32)      PHYSICAL EXAM  General: NAD  Head: NC/AT; MMM; PERRL; EOMI;  Neck: Supple; no JVD  Respiratory: CTAB  Cardiovascular: Regular rhythm/rate; S1/S2+  Gastrointestinal: Soft; nontender, mildly distended abdomen; bowel sounds normal and present  Extremities: WWP; no edema/cyanosis  Neurological: A&Ox3, mild asterixis noted     MEDICATIONS  (STANDING):  cyanocobalamin Injectable 1000 MICROGram(s) IntraMuscular once  dextrose 5%. 1000 milliLiter(s) (50 mL/Hr) IV Continuous <Continuous>  dextrose 5%. 1000 milliLiter(s) (100 mL/Hr) IV Continuous <Continuous>  dextrose 50% Injectable 25 Gram(s) IV Push once  dextrose 50% Injectable 12.5 Gram(s) IV Push once  dextrose 50% Injectable 25 Gram(s) IV Push once  gabapentin 300 milliGRAM(s) Oral three times a day  glucagon  Injectable 1 milliGRAM(s) IntraMuscular once  influenza   Vaccine 0.5 milliLiter(s) IntraMuscular once  insulin lispro (ADMELOG) corrective regimen sliding scale   SubCutaneous Before meals and at bedtime  lactulose Syrup 20 Gram(s) Oral every 8 hours  nicotine -  14 mG/24Hr(s) Patch 1 Patch Transdermal daily  pantoprazole    Tablet 40 milliGRAM(s) Oral before breakfast  sucralfate 1 Gram(s) Oral four times a day    MEDICATIONS  (PRN):  acetaminophen     Tablet .. 650 milliGRAM(s) Oral every 6 hours PRN Temp greater or equal to 38C (100.4F), Mild Pain (1 - 3)  albuterol/ipratropium for Nebulization 3 milliLiter(s) Nebulizer every 6 hours PRN Shortness of Breath and/or Wheezing  dextrose Oral Gel 15 Gram(s) Oral once PRN Blood Glucose LESS THAN 70 milliGRAM(s)/deciliter      No Known Allergies      LABS                        9.0    4.97  )-----------( 103      ( 06 Sep 2022 05:30 )             28.9     09-06    140  |  104  |  43<H>  ----------------------------<  150<H>  5.0   |  27  |  1.22    Ca    10.2      06 Sep 2022 05:30  Phos  1.9     09-06  Mg     1.6     09-06    TPro  7.5  /  Alb  4.0  /  TBili  0.7  /  DBili  x   /  AST  20  /  ALT  10  /  AlkPhos  86  09-06    PT/INR - ( 06 Sep 2022 05:30 )   PT: 15.6 sec;   INR: 1.31          PTT - ( 05 Sep 2022 06:03 )  PTT:34.3 sec      IMAGING/EKG/ETC  
Patient is a 51y Male seen and evaluated at bedside. No acute distress, patient does not offer any complaints. Patient states that he feels much better. VSS, sCr improving.       Meds:    acetaminophen     Tablet .. 650 every 6 hours PRN  albumin human 25% IVPB 100 every 8 hours  albumin human 25% IVPB 100 once  albuterol/ipratropium for Nebulization 3 every 6 hours PRN  dextrose 5%. 1000 <Continuous>  dextrose 5%. 1000 <Continuous>  dextrose 50% Injectable 25 once  dextrose 50% Injectable 12.5 once  dextrose 50% Injectable 25 once  dextrose Oral Gel 15 once PRN  gabapentin 300 three times a day  glucagon  Injectable 1 once  influenza   Vaccine 0.5 once  insulin lispro (ADMELOG) corrective regimen sliding scale  Before meals and at bedtime  nicotine -  14 mG/24Hr(s) Patch 1 daily  pantoprazole    Tablet 40 before breakfast  sucralfate 1 four times a day      T(C): , Max: 36.9 (22 @ 15:24)  T(F): , Max: 98.5 (22 @ 15:24)  HR: 85 (22 @ 09:00)  BP: 111/61 (22 @ 09:00)  BP(mean): --  RR: 17 (22 @ 09:00)  SpO2: 100% (22 @ 09:00)  Wt(kg): --     @ 07:01  -   @ 07:00  --------------------------------------------------------  IN: 2100 mL / OUT: 600 mL / NET: 1500 mL          Review of Systems:  ROS negative except as per HPI      PHYSICAL EXAM:  Constitutional: , NAD, sitting up in bed having breakfast   HEENT: NC/AT, EOMI, no conjunctival pallor or scleral icterus, MMM  Neck: Supple, no JVD  Respiratory: CTA B/L. No w/r/r.   Cardiovascular: RRR, normal S1 and S2, no m/r/g.   Gastrointestinal: +BS, soft, mildly distended, no fluid wave, tender to deep palpitation RLQ, no guarding or rebound tenderness, no palpable masses   Extremities: wwp; no cyanosis, clubbing or edema.   Vascular: Pulses equal and strong throughout.   Neurological: AAOx3, no CN deficits, strength and sensation intact throughout.   Skin: No gross skin abnormalities or rashes        LABS:                        9.1    4.60  )-----------( 113      ( 05 Sep 2022 06:03 )             29.3         141  |  106  |  70<H>  ----------------------------<  85  5.1   |  25  |  1.97<H>    Ca    10.1      05 Sep 2022 06:03  Phos  3.4       Mg     2.0         TPro  7.8  /  Alb  4.0  /  TBili  0.6  /  DBili  x   /  AST  18  /  ALT  11  /  AlkPhos  85  09-      PT/INR - ( 05 Sep 2022 06:03 )   PT: 15.5 sec;   INR: 1.30          PTT - ( 05 Sep 2022 06:03 )  PTT:34.3 sec  Urinalysis Basic - ( 04 Sep 2022 06:17 )    Color: Yellow / Appearance: Clear / S.015 / pH: x  Gluc: x / Ketone: NEGATIVE  / Bili: NEGATIVE / Urobili: 0.2 E.U./dL   Blood: x / Protein: NEGATIVE mg/dL / Nitrite: NEGATIVE   Leuk Esterase: Trace / RBC: < 5 /HPF / WBC Many /HPF   Sq Epi: x / Non Sq Epi: 5-10 /HPF / Bacteria: Many /HPF      Sodium, Random Urine: 54 mmol/L ( @ 10:44)  Creatinine, Random Urine: 58 mg/dL ( @ 10:44)        RADIOLOGY & ADDITIONAL STUDIES:          
OVERNIGHT EVENTS: Rep[eat K ON, 6.3, no EKG changes. Calcium gluconate, insulin, glucose, and lokelma given. Repeat K this AM 5.1.    SUBJECTIVE:  Patient seen and examined at bedside. Reports he is feeling very good. Slept well, reports initial fatigue and weakness is improving. Abdominal swelling improved. Denies any fever, chills, headache, nausea, vomiting, abdominal pain, diarrhea, constipation, dysuria, hematuria, polyuria.    Vital Signs Last 12 Hrs  T(F): 97.6 (22 @ 09:00), Max: 98.4 (22 @ 05:14)  HR: 85 (22 @ 09:00) (82 - 85)  BP: 111/61 (22 @ 09:00) (103/65 - 111/61)  BP(mean): --  RR: 17 (22 @ 09:00) (17 - 19)  SpO2: 100% (22 @ 09:00) (100% - 100%)  I&O's Summary    04 Sep 2022 07:01  -  05 Sep 2022 07:00  --------------------------------------------------------  IN: 2100 mL / OUT: 600 mL / NET: 1500 mL        PHYSICAL EXAM:  Constitutional: middle aged male resting, NAD, comfortable in bed.  HEENT: NC/AT, EOMI, no conjunctival pallor or scleral icterus, MMM  Neck: Supple, no JVD  Respiratory: CTA B/L. No w/r/r.   Cardiovascular: RRR, normal S1 and S2, no m/r/g.   Gastrointestinal: +BS, soft, mildly distended, no fluid wave, tender to deep palpitation RLQ, no guarding or rebound tenderness, no palpable masses   Extremities: wwp; no cyanosis, clubbing or edema.   Vascular: Pulses equal and strong throughout.   Neurological: AAOx3, no CN deficits, strength and sensation intact throughout.   Skin: No gross skin abnormalities or rashes        LABS:                        9.1    4.60  )-----------( 113      ( 05 Sep 2022 06:03 )             29.3     09-05    141  |  106  |  70<H>  ----------------------------<  85  5.1   |  25  |  1.97<H>    Ca    10.1      05 Sep 2022 06:03  Phos  3.4     09-05  Mg     2.0     09-05    TPro  7.8  /  Alb  4.0  /  TBili  0.6  /  DBili  x   /  AST  18  /  ALT  11  /  AlkPhos  85  09-05    PT/INR - ( 05 Sep 2022 06:03 )   PT: 15.5 sec;   INR: 1.30          PTT - ( 05 Sep 2022 06:03 )  PTT:34.3 sec  Urinalysis Basic - ( 04 Sep 2022 06:17 )    Color: Yellow / Appearance: Clear / S.015 / pH: x  Gluc: x / Ketone: NEGATIVE  / Bili: NEGATIVE / Urobili: 0.2 E.U./dL   Blood: x / Protein: NEGATIVE mg/dL / Nitrite: NEGATIVE   Leuk Esterase: Trace / RBC: < 5 /HPF / WBC Many /HPF   Sq Epi: x / Non Sq Epi: 5-10 /HPF / Bacteria: Many /HPF          RADIOLOGY & ADDITIONAL TESTS:    MEDICATIONS  (STANDING):  albumin human 25% IVPB 100 milliLiter(s) IV Intermittent every 8 hours  albumin human 25% IVPB 100 milliLiter(s) IV Intermittent once  dextrose 5%. 1000 milliLiter(s) (50 mL/Hr) IV Continuous <Continuous>  dextrose 5%. 1000 milliLiter(s) (100 mL/Hr) IV Continuous <Continuous>  dextrose 50% Injectable 25 Gram(s) IV Push once  dextrose 50% Injectable 12.5 Gram(s) IV Push once  dextrose 50% Injectable 25 Gram(s) IV Push once  gabapentin 300 milliGRAM(s) Oral three times a day  glucagon  Injectable 1 milliGRAM(s) IntraMuscular once  influenza   Vaccine 0.5 milliLiter(s) IntraMuscular once  insulin lispro (ADMELOG) corrective regimen sliding scale   SubCutaneous Before meals and at bedtime  nicotine -  14 mG/24Hr(s) Patch 1 Patch Transdermal daily  pantoprazole    Tablet 40 milliGRAM(s) Oral before breakfast  sucralfate 1 Gram(s) Oral four times a day    MEDICATIONS  (PRN):  acetaminophen     Tablet .. 650 milliGRAM(s) Oral every 6 hours PRN Temp greater or equal to 38C (100.4F), Mild Pain (1 - 3)  albuterol/ipratropium for Nebulization 3 milliLiter(s) Nebulizer every 6 hours PRN Shortness of Breath and/or Wheezing  dextrose Oral Gel 15 Gram(s) Oral once PRN Blood Glucose LESS THAN 70 milliGRAM(s)/deciliter

## 2022-09-06 NOTE — PROGRESS NOTE ADULT - PROBLEM SELECTOR PLAN 9
Pt has history of PUD. Denies any melena or hematochezia.   - Protonix 40 mg PO daily while inpatient
Pt has history of PUD. Denies any melena or hematochezia.   - Protonix 40 mg PO daily while inpatient

## 2022-09-06 NOTE — DISCHARGE NOTE NURSING/CASE MANAGEMENT/SOCIAL WORK - PATIENT PORTAL LINK FT
You can access the FollowMyHealth Patient Portal offered by [facility] by registering at the following website: [email or web address]. By joining [employer]s FollowMyHealth portal, you will also be able to view your health information using other applications (apps) compatible with our system. You can access the FollowMyHealth Patient Portal offered by [facility] by registering at the following website: [email or web address]. By joining [employer]’s FollowMyHealth portal, you will also be able to view your health information using other applications (apps) compatible with our system. You can access the FollowMyHealth Patient Portal offered by [facility] by registering at the following website: [email or web address]. By joining [employer]’s FollowMyHealth portal, you will also be able to view your health information using other applications (apps) compatible with our system.

## 2022-09-06 NOTE — PROGRESS NOTE ADULT - PROBLEM SELECTOR PLAN 6
Pt has history of HTN and takes lisinopril-HCTZ 20 mg-25 mg PO daily   - Hold home medication i/s/o hypotension; can restart on discharge or as clinically indicated
Pt has history of HTN and takes lisinopril-HCTZ 20 mg-25 mg PO daily   - Hold home medication i/s/o hypotension; can restart on discharge or as clinically indicated

## 2022-09-07 NOTE — ED PROVIDER NOTE - CHIEF COMPLAINT
no chest pain, no cough, and no shortness of breath. The patient is a 51y Male complaining of abdominal pain.

## 2022-09-09 ENCOUNTER — INPATIENT (INPATIENT)
Facility: HOSPITAL | Age: 52
LOS: 10 days | Discharge: HOME | DRG: 871 | End: 2022-09-20
Attending: STUDENT IN AN ORGANIZED HEALTH CARE EDUCATION/TRAINING PROGRAM | Admitting: STUDENT IN AN ORGANIZED HEALTH CARE EDUCATION/TRAINING PROGRAM
Payer: MEDICARE

## 2022-09-09 VITALS
HEART RATE: 133 BPM | HEIGHT: 62 IN | SYSTOLIC BLOOD PRESSURE: 131 MMHG | OXYGEN SATURATION: 97 % | TEMPERATURE: 103 F | DIASTOLIC BLOOD PRESSURE: 85 MMHG | WEIGHT: 199.96 LBS | RESPIRATION RATE: 18 BRPM

## 2022-09-09 DIAGNOSIS — Z98.84 BARIATRIC SURGERY STATUS: Chronic | ICD-10-CM

## 2022-09-09 LAB
ALBUMIN SERPL ELPH-MCNC: 3.4 G/DL — SIGNIFICANT CHANGE UP (ref 3.4–5)
ALP SERPL-CCNC: 83 U/L — SIGNIFICANT CHANGE UP (ref 40–120)
ALT FLD-CCNC: 18 U/L — SIGNIFICANT CHANGE UP (ref 12–42)
AMMONIA BLD-MCNC: SIGNIFICANT CHANGE UP UMOL/L (ref 11–32)
ANION GAP SERPL CALC-SCNC: 13 MMOL/L — SIGNIFICANT CHANGE UP (ref 9–16)
APPEARANCE UR: CLEAR — SIGNIFICANT CHANGE UP
APTT BLD: 30.6 SEC — SIGNIFICANT CHANGE UP (ref 27.5–35.5)
AST SERPL-CCNC: 39 U/L — HIGH (ref 15–37)
BACTERIA # UR AUTO: PRESENT /HPF
BASOPHILS # BLD AUTO: 0.02 K/UL — SIGNIFICANT CHANGE UP (ref 0–0.2)
BASOPHILS NFR BLD AUTO: 0.2 % — SIGNIFICANT CHANGE UP (ref 0–2)
BILIRUB SERPL-MCNC: 1.5 MG/DL — HIGH (ref 0.2–1.2)
BILIRUB UR-MCNC: NEGATIVE — SIGNIFICANT CHANGE UP
BUN SERPL-MCNC: 35 MG/DL — HIGH (ref 7–23)
CALCIUM SERPL-MCNC: 9.3 MG/DL — SIGNIFICANT CHANGE UP (ref 8.5–10.5)
CHLORIDE SERPL-SCNC: 90 MMOL/L — LOW (ref 96–108)
CO2 SERPL-SCNC: 23 MMOL/L — SIGNIFICANT CHANGE UP (ref 22–31)
COLOR SPEC: YELLOW — SIGNIFICANT CHANGE UP
CREAT SERPL-MCNC: 1.72 MG/DL — HIGH (ref 0.5–1.3)
DIFF PNL FLD: ABNORMAL
EGFR: 47 ML/MIN/1.73M2 — LOW
EOSINOPHIL # BLD AUTO: 0 K/UL — SIGNIFICANT CHANGE UP (ref 0–0.5)
EOSINOPHIL NFR BLD AUTO: 0 % — SIGNIFICANT CHANGE UP (ref 0–6)
EPI CELLS # UR: ABNORMAL /HPF (ref 0–5)
ETHANOL SERPL-MCNC: <3 MG/DL — SIGNIFICANT CHANGE UP
GLUCOSE SERPL-MCNC: 193 MG/DL — HIGH (ref 70–99)
GLUCOSE UR QL: 250
HCT VFR BLD CALC: 27.6 % — LOW (ref 39–50)
HGB BLD-MCNC: 9.2 G/DL — LOW (ref 13–17)
IMM GRANULOCYTES NFR BLD AUTO: 0.7 % — SIGNIFICANT CHANGE UP (ref 0–1.5)
INR BLD: 1.67 — HIGH (ref 0.88–1.16)
KETONES UR-MCNC: NEGATIVE — SIGNIFICANT CHANGE UP
LACTATE SERPL-SCNC: 2.5 MMOL/L — HIGH (ref 0.4–2)
LACTATE SERPL-SCNC: 2.7 MMOL/L — HIGH (ref 0.4–2)
LEUKOCYTE ESTERASE UR-ACNC: ABNORMAL
LIDOCAIN IGE QN: 105 U/L — SIGNIFICANT CHANGE UP (ref 73–393)
LYMPHOCYTES # BLD AUTO: 0.32 K/UL — LOW (ref 1–3.3)
LYMPHOCYTES # BLD AUTO: 3 % — LOW (ref 13–44)
MAGNESIUM SERPL-MCNC: 1.1 MG/DL — LOW (ref 1.6–2.6)
MANUAL SMEAR VERIFICATION: SIGNIFICANT CHANGE UP
MCHC RBC-ENTMCNC: 28.7 PG — SIGNIFICANT CHANGE UP (ref 27–34)
MCHC RBC-ENTMCNC: 33.3 GM/DL — SIGNIFICANT CHANGE UP (ref 32–36)
MCV RBC AUTO: 86 FL — SIGNIFICANT CHANGE UP (ref 80–100)
MONOCYTES # BLD AUTO: 0.57 K/UL — SIGNIFICANT CHANGE UP (ref 0–0.9)
MONOCYTES NFR BLD AUTO: 5.3 % — SIGNIFICANT CHANGE UP (ref 2–14)
NEUTROPHILS # BLD AUTO: 9.75 K/UL — HIGH (ref 1.8–7.4)
NEUTROPHILS NFR BLD AUTO: 90.8 % — HIGH (ref 43–77)
NITRITE UR-MCNC: NEGATIVE — SIGNIFICANT CHANGE UP
NRBC # BLD: 0 /100 WBCS — SIGNIFICANT CHANGE UP (ref 0–0)
PH UR: 6 — SIGNIFICANT CHANGE UP (ref 5–8)
PLAT MORPH BLD: SIGNIFICANT CHANGE UP
PLATELET # BLD AUTO: 79 K/UL — LOW (ref 150–400)
PLATELET COUNT - ESTIMATE: ABNORMAL
POTASSIUM SERPL-MCNC: 4.4 MMOL/L — SIGNIFICANT CHANGE UP (ref 3.5–5.3)
POTASSIUM SERPL-SCNC: 4.4 MMOL/L — SIGNIFICANT CHANGE UP (ref 3.5–5.3)
PROT SERPL-MCNC: 8.3 G/DL — HIGH (ref 6.4–8.2)
PROT UR-MCNC: ABNORMAL MG/DL
PROTHROM AB SERPL-ACNC: 19.5 SEC — HIGH (ref 10.5–13.4)
RBC # BLD: 3.21 M/UL — LOW (ref 4.2–5.8)
RBC # FLD: 14.9 % — HIGH (ref 10.3–14.5)
RBC BLD AUTO: NORMAL — SIGNIFICANT CHANGE UP
RBC CASTS # UR COMP ASSIST: < 5 /HPF — SIGNIFICANT CHANGE UP
SARS-COV-2 RNA SPEC QL NAA+PROBE: SIGNIFICANT CHANGE UP
SODIUM SERPL-SCNC: 126 MMOL/L — LOW (ref 132–145)
SP GR SPEC: 1.01 — SIGNIFICANT CHANGE UP (ref 1–1.03)
TROPONIN I, HIGH SENSITIVITY RESULT: 20.2 NG/L — SIGNIFICANT CHANGE UP
UROBILINOGEN FLD QL: 1 E.U./DL — SIGNIFICANT CHANGE UP
WBC # BLD: 10.74 K/UL — HIGH (ref 3.8–10.5)
WBC # FLD AUTO: 10.74 K/UL — HIGH (ref 3.8–10.5)
WBC UR QL: ABNORMAL /HPF

## 2022-09-09 PROCEDURE — 99283 EMERGENCY DEPT VISIT LOW MDM: CPT | Mod: FS,CS

## 2022-09-09 PROCEDURE — 99221 1ST HOSP IP/OBS SF/LOW 40: CPT | Mod: GC

## 2022-09-09 PROCEDURE — 71045 X-RAY EXAM CHEST 1 VIEW: CPT | Mod: 26

## 2022-09-09 RX ORDER — SODIUM CHLORIDE 9 MG/ML
2800 INJECTION INTRAMUSCULAR; INTRAVENOUS; SUBCUTANEOUS ONCE
Refills: 0 | Status: COMPLETED | OUTPATIENT
Start: 2022-09-09 | End: 2022-09-09

## 2022-09-09 RX ORDER — CEFTRIAXONE 500 MG/1
1000 INJECTION, POWDER, FOR SOLUTION INTRAMUSCULAR; INTRAVENOUS ONCE
Refills: 0 | Status: COMPLETED | OUTPATIENT
Start: 2022-09-09 | End: 2022-09-09

## 2022-09-09 RX ORDER — PIPERACILLIN AND TAZOBACTAM 4; .5 G/20ML; G/20ML
3.38 INJECTION, POWDER, LYOPHILIZED, FOR SOLUTION INTRAVENOUS ONCE
Refills: 0 | Status: COMPLETED | OUTPATIENT
Start: 2022-09-09 | End: 2022-09-09

## 2022-09-09 RX ORDER — IBUPROFEN 200 MG
600 TABLET ORAL ONCE
Refills: 0 | Status: COMPLETED | OUTPATIENT
Start: 2022-09-09 | End: 2022-09-09

## 2022-09-09 RX ORDER — NOREPINEPHRINE BITARTRATE/D5W 8 MG/250ML
0.05 PLASTIC BAG, INJECTION (ML) INTRAVENOUS
Qty: 8 | Refills: 0 | Status: DISCONTINUED | OUTPATIENT
Start: 2022-09-09 | End: 2022-09-10

## 2022-09-09 RX ORDER — SODIUM CHLORIDE 9 MG/ML
2000 INJECTION, SOLUTION INTRAVENOUS ONCE
Refills: 0 | Status: DISCONTINUED | OUTPATIENT
Start: 2022-09-09 | End: 2022-09-09

## 2022-09-09 RX ORDER — ONDANSETRON 8 MG/1
4 TABLET, FILM COATED ORAL ONCE
Refills: 0 | Status: COMPLETED | OUTPATIENT
Start: 2022-09-09 | End: 2022-09-09

## 2022-09-09 RX ORDER — ACETAMINOPHEN 500 MG
650 TABLET ORAL ONCE
Refills: 0 | Status: COMPLETED | OUTPATIENT
Start: 2022-09-09 | End: 2022-09-09

## 2022-09-09 RX ORDER — MAGNESIUM SULFATE 500 MG/ML
2 VIAL (ML) INJECTION ONCE
Refills: 0 | Status: COMPLETED | OUTPATIENT
Start: 2022-09-09 | End: 2022-09-09

## 2022-09-09 RX ADMIN — ONDANSETRON 4 MILLIGRAM(S): 8 TABLET, FILM COATED ORAL at 17:27

## 2022-09-09 RX ADMIN — Medication 650 MILLIGRAM(S): at 16:34

## 2022-09-09 RX ADMIN — Medication 25 GRAM(S): at 18:50

## 2022-09-09 RX ADMIN — Medication 600 MILLIGRAM(S): at 16:04

## 2022-09-09 RX ADMIN — Medication 1 TABLET(S): at 17:48

## 2022-09-09 RX ADMIN — Medication 600 MILLIGRAM(S): at 16:34

## 2022-09-09 RX ADMIN — CEFTRIAXONE 100 MILLIGRAM(S): 500 INJECTION, POWDER, FOR SOLUTION INTRAMUSCULAR; INTRAVENOUS at 16:04

## 2022-09-09 RX ADMIN — PIPERACILLIN AND TAZOBACTAM 200 GRAM(S): 4; .5 INJECTION, POWDER, LYOPHILIZED, FOR SOLUTION INTRAVENOUS at 17:48

## 2022-09-09 RX ADMIN — Medication 650 MILLIGRAM(S): at 16:04

## 2022-09-09 RX ADMIN — SODIUM CHLORIDE 2800 MILLILITER(S): 9 INJECTION INTRAMUSCULAR; INTRAVENOUS; SUBCUTANEOUS at 16:03

## 2022-09-09 RX ADMIN — Medication 8.5 MICROGRAM(S)/KG/MIN: at 21:04

## 2022-09-09 NOTE — ED PROVIDER NOTE - CPE EDP CARDIAC NORM
An [place] Automated [facility] Reporting System report was run and reviewed on the patient today. The results are consistent with the patient's treatment plan. - - -

## 2022-09-09 NOTE — ED ADULT NURSE NOTE - CHIEF COMPLAINT QUOTE
BIBA from shelter c/o generalized malaise, nausea and fevers x 1 day. SIRS criteria met, protocol initiated and followed. recently dc'd from [facility] BIBA from shelter c/o generalized malaise, nausea and fevers x 1 day. SIRS criteria met, protocol initiated and followed. recently dc'd from [facility] BIBA from shelter c/o generalized malaise, nausea and fevers x 1 day. SIRS criteria met, protocol initiated and followed. recently dc'd from [facility]

## 2022-09-09 NOTE — ED ADULT NURSE NOTE - OBJECTIVE STATEMENT
Pt c/o weakness , nausea and fatigue recently discharged from [facility] Pt c/o weakness , nausea and fatigue recently discharged from [facility] Pt c/o weakness , nausea and fatigue recently discharged from [facility]

## 2022-09-09 NOTE — CHART NOTE - NSCHARTNOTEFT_GEN_A_CORE
Mr. Stack was discharged from my service on 9/6/22 (see discharge summary for details).  I was alerted today that Mr. Stack's urine culture from 9/4/22 is growing ESBL E. coli.  I called Mr. Stack ([phone]) to tell him these results and he states he was in the emergency department at [facility].  I called [facility] ED to alert them to this lab result, left a message with a staff member but am waiting for a call back from the medical provider.  I will attempt to call back shortly if I do not receive a return phone call. Mr. Stack was discharged from my service on 9/6/22 (see discharge summary for details).  I was alerted today that Mr. Stack's urine culture from 9/4/22 is growing ESBL E. coli.  I called Mr. Stack ([phone]) to tell him these results and he states he was in the emergency department at [facility].  I called [facility] ED to alert them to this lab result, left a message with a staff member but am waiting for a call back from the medical provider.  I will attempt to call back shortly if I do not receive a return phone call. Mr. Stack was discharged from my service on 9/6/22 (see discharge summary for details).  I was alerted today that Mr. Stack's urine culture from 9/4/22 is growing ESBL E. coli.  I called Mr. Stack ([phone]) to tell him these results and he states he was in the emergency department at [facility].  I called [facility] ED to alert them to this lab result, left a message with a staff member but am waiting for a call back from the medical provider.  I will attempt to call back shortly if I do not receive a return phone call.

## 2022-09-09 NOTE — H&P ADULT - ASSESSMENT
52 y/o man with PMH significant for cirrhosis, DM with previously untreated ESBL UTI, presenting with rigors, AMS, fever and hemodynamic instability with elevated WBC count likely due to urosepsis with multiorgan failure 2/2 UTI    Neuro     Pulm     CVS      50 y/o man with PMH significant for cirrhosis, DM with previously untreated ESBL UTI, presenting with rigors, AMS, fever and hemodynamic instability with elevated WBC count likely due to urosepsis with multiorgan failure 2/2 UTI    Neuro     Pulm     CVS      50 y/o man with PMH significant for cirrhosis, DM with previously untreated ESBL UTI, presenting with rigors, AMS, fever and hemodynamic instability with elevated WBC count likely due to urosepsis with multiorgan failure 2/2 UTI    Neuro   #Metabolic encephalopathy   Multifactorial 2/2 cirrhosis/ammonia, septic shock or alcohol withdrawal   Avoid sedating drugs   - Ammonia level       malnourished and mag and phos coud be low     Pulmonary  sat >94%   aspiration precaution     CVS   cardiac enzymes (trops were ok need to repeat in 2 hours)   can get more fluid c/w with levo     GI   NPO     Endo   check phosphate   ISS     Renal  Repeat mg     ID:   ESBL sensitive to zosyn but UA from sep 4th   watch renal function   repeat urine culture     Heme   nothing to do   DVT propylaxis    52 y/o man with PMH significant for cirrhosis, DM with previously untreated ESBL UTI, presenting with rigors, AMS, fever and hemodynamic instability with elevated WBC count likely due to urosepsis with multiorgan failure 2/2 UTI    Neuro   #Metabolic encephalopathy   Multifactorial 2/2 cirrhosis/ammonia, septic shock or alcohol withdrawal   Avoid sedating drugs   - Ammonia level     malnourished and mag and phos coud be low     Pulmonary  Maintain SpO2>94%   Aspiration precautions    CVS   #Septic shock with multiorgan failure   Patient with low Bp, tachycardia, fever,  WBC of 10.74, lactate 2.5, elevated Cr (1.72), and elevated bili (1.5) and AST       TTE on prior admission showed EF 55-60%   cardiac enzymes (trops were ok need to repeat in 2 hours)   can get more fluid c/w with levo     GI   NPO     Endo   check phosphate   ISS     Renal  Repeat mg     ID:   ESBL sensitive to zosyn but UA from sep 4th   watch renal function   repeat urine culture     Heme   nothing to do   DVT propylaxis    50 y/o man with PMH significant for cirrhosis, DM with previously untreated ESBL UTI, presenting with rigors, AMS, fever and hemodynamic instability with elevated WBC count likely due to urosepsis with multiorgan failure 2/2 UTI    Neuro   #Metabolic encephalopathy   Multifactorial 2/2 cirrhosis/ammonia, septic shock or alcohol withdrawal   Avoid sedating drugs   - Ammonia level     malnourished and mag and phos coud be low     Pulmonary  Maintain SpO2>94%   Aspiration precautions    CVS   #Septic shock with multiorgan failure   Patient with low Bp, tachycardia, fever,  WBC of 10.74, lactate 2.5, elevated Cr (1.72), and elevated bili (1.5) and AST       TTE on prior admission showed EF 55-60%   cardiac enzymes (trops were ok need to repeat in 2 hours)   can get more fluid c/w with levo     GI   NPO     Endo   check phosphate   ISS     Renal  Repeat mg     ID:   ESBL sensitive to zosyn but UA from sep 4th   watch renal function   repeat urine culture     Heme   nothing to do   DVT propylaxis    52 y/o man with PMH significant for cirrhosis, DM with previously untreated ESBL UTI, presenting with rigors, AMS, fever and hemodynamic instability with elevated WBC count likely due to multiorgan failure 2/2 UTI    Neuro   #Metabolic encephalopathy   Multifactorial 2/2 cirrhosis/ammonia, septic shock, alcohol withdrawal and malnourished   Avoid sedating drugs   - Ammonia level     malnourished and mag and phos coud be low     Pulmonary  Maintain SpO2>94%   Aspiration precautions    CVS   #Septic shock with multiorgan failure   Patient with low Bp, tachycardia, fever,  WBC of 10.74, lactate 2.5, elevated Cr (1.72), and elevated bili (1.5) and AST         TTE on prior admission showed EF 55-60%   cardiac enzymes (trops were ok need to repeat in 2 hours)   can get more fluid c/w with levo     GI   NPO     Endo   check phosphate   ISS     Renal  Repeat mg     ID:   ESBL sensitive to zosyn but UA from sep 4th   watch renal function   repeat urine culture     Heme   nothing to do   DVT propylaxis    50 y/o man with PMH significant for cirrhosis, DM with previously untreated ESBL UTI, presenting with rigors, AMS, fever and hemodynamic instability with elevated WBC count likely due to multiorgan failure 2/2 UTI    Neuro   #Metabolic encephalopathy   Multifactorial 2/2 cirrhosis/ammonia, septic shock, alcohol withdrawal and malnourished   Avoid sedating drugs   - Ammonia level     malnourished and mag and phos coud be low     Pulmonary  Maintain SpO2>94%   Aspiration precautions    CVS   #Septic shock with multiorgan failure   Patient with low Bp, tachycardia, fever,  WBC of 10.74, lactate 2.5, elevated Cr (1.72), and elevated bili (1.5) and AST         TTE on prior admission showed EF 55-60%   cardiac enzymes (trops were ok need to repeat in 2 hours)   can get more fluid c/w with levo     GI   NPO     Endo   check phosphate   ISS     Renal  Repeat mg     ID:   ESBL sensitive to zosyn but UA from sep 4th   watch renal function   repeat urine culture     Heme   nothing to do   DVT propylaxis    50 y/o man with PMH significant for cirrhosis, DM with previously untreated ESBL UTI, presenting with rigors, AMS, fever and hemodynamic instability with elevated WBC count likely due to multiorgan failure 2/2 UTI    Neuro   #Metabolic encephalopathy   Multifactorial 2/2 cirrhosis/ammonia, septic shock, alcohol withdrawal and malnourished (hypomagnesemia, hypophosphatemia)   - F/u Alcohol level   - F/u Ammonia level   - Replete electrolytes   - CIWA protocol (Ativan for CIWA >8)  - Start multivitamin, thiamine     Pulmonary  Maintain SpO2>94%   Aspiration precautions    CVS   #Septic shock with multiorgan failure   Patient with low Bp, tachycardia, fever,  WBC of 10.74, lactate 2.5, elevated Cr (1.72), and elevated bili (1.5) and AST         TTE on prior admission showed EF 55-60%   cardiac enzymes (trops were ok need to repeat in 2 hours)   can get more fluid c/w with levo     GI   NPO     Endo   check phosphate   ISS     Renal  Repeat mg     ID:   ESBL sensitive to zosyn but UA from sep 4th   watch renal function   repeat urine culture     Heme   nothing to do   DVT propylaxis    52 y/o man with PMH significant for cirrhosis, DM with previously untreated ESBL UTI, presenting with rigors, AMS, fever and hemodynamic instability with elevated WBC count likely due to multiorgan failure 2/2 UTI    Neuro   #Metabolic encephalopathy   Multifactorial 2/2 cirrhosis/ammonia, septic shock, alcohol withdrawal and malnourished (hypomagnesemia, hypophosphatemia)   - F/u Alcohol level   - F/u Ammonia level   - Replete electrolytes   - CIWA protocol (Ativan for CIWA >8)  - Start multivitamin, thiamine     Pulmonary  Maintain SpO2>94%   Aspiration precautions    CVS   #Septic shock with multiorgan failure   Patient with low Bp, tachycardia, fever,  WBC of 10.74, lactate 2.5, elevated Cr (1.72), and elevated bili (1.5) and AST         TTE on prior admission showed EF 55-60%   cardiac enzymes (trops were ok need to repeat in 2 hours)   can get more fluid c/w with levo     GI   NPO     Endo   check phosphate   ISS     Renal  Repeat mg     ID:   ESBL sensitive to zosyn but UA from sep 4th   watch renal function   repeat urine culture     Heme   nothing to do   DVT propylaxis    50 y/o man with PMH significant for cirrhosis, DM with previously untreated ESBL UTI, presenting with rigors, AMS, fever and hemodynamic instability with elevated WBC count likely due to multiorgan failure 2/2 UTI    Neuro   #Metabolic encephalopathy   Multifactorial 2/2 cirrhosis/ammonia, septic shock, alcohol withdrawal and malnourished (hypomagnesemia, hypophosphatemia)   - F/u Alcohol level   - F/u Ammonia level   - Replete electrolytes   - CIWA protocol (Ativan for CIWA >8)  - Start multivitamin, thiamine and folate    Pulmonary  Maintain SpO2>94%   Aspiration precautions    CVS   #Septic shock with multiorgan failure   Patient with low Bp, tachycardia, fever,  WBC of 10.74, lactate 2.5, elevated Cr (1.72), and elevated bili (1.5) and AST   likely source UTI (ESBL)         TTE on prior admission showed EF 55-60%   cardiac enzymes (trops were ok need to repeat in 2 hours)   can get more fluid c/w with levo     GI   #PUD   - Start pantoprazole   - Pt on a regular diet     #Cirrhosis   - C/w lactulose q8    Endo   ISS     Renal  #LINNETTE   Cr elevated to 1.72 and improving to 1.42 after fluid resuscitation and levo      ID:   ESBL sensitive to zosyn but UA from sep 4th   watch renal function   repeat urine culture     Heme   nothing to do   DVT propylaxis    52 y/o man with PMH significant for cirrhosis, DM with previously untreated ESBL UTI, presenting with rigors, AMS, fever and hemodynamic instability with elevated WBC count likely due to multiorgan failure 2/2 UTI    Neuro   #Metabolic encephalopathy   Multifactorial 2/2 cirrhosis/ammonia, septic shock, alcohol withdrawal and malnourished (hypomagnesemia, hypophosphatemia)   - F/u Alcohol level   - F/u Ammonia level   - Replete electrolytes   - CIWA protocol (Ativan for CIWA >8)  - Start multivitamin, thiamine and folate    Pulmonary  Maintain SpO2>94%   Aspiration precautions    CVS   #Septic shock with multiorgan failure   Patient with low Bp, tachycardia, fever,  WBC of 10.74, lactate 2.5, elevated Cr (1.72), and elevated bili (1.5) and AST   likely source UTI (ESBL)         TTE on prior admission showed EF 55-60%   cardiac enzymes (trops were ok need to repeat in 2 hours)   can get more fluid c/w with levo     GI   #PUD   - Start pantoprazole   - Pt on a regular diet     #Cirrhosis   - C/w lactulose q8    Endo   ISS     Renal  #LINNETTE   Cr elevated to 1.72 and improving to 1.42 after fluid resuscitation and levo      ID:   ESBL sensitive to zosyn but UA from sep 4th   watch renal function   repeat urine culture     Heme   nothing to do   DVT propylaxis    52 y/o man with PMH significant for cirrhosis, DM with previously untreated ESBL UTI, presenting with rigors, AMS, fever and hemodynamic instability with elevated WBC count likely due to multiorgan failure 2/2 UTI    Neuro   #Metabolic encephalopathy   Multifactorial 2/2 cirrhosis/ammonia, septic shock, alcohol withdrawal and malnourished (hypomagnesemia, hypophosphatemia)   - F/u Alcohol level   - F/u Ammonia level   - Replete electrolytes   - CIWA protocol (Ativan for CIWA >8)  - Start multivitamin, thiamine and folate    Pulmonary  Maintain SpO2>94%     CVS   #Septic shock with multiorgan failure   Patient meets 3 sepsis criteria (tachycardic, febrile) with low Bp, tachycardia, fever,  WBC of 10.74, lactate 2.5, elevated Cr (1.72), and elevated bili (1.5) and AST   likely source UTI (ESBL)         TTE on prior admission showed EF 55-60%   cardiac enzymes (trops were ok need to repeat in 2 hours)   can get more fluid c/w with levo     GI   #PUD   - Start pantoprazole   - Pt on a regular diet     #Cirrhosis   - C/w lactulose q8    Endo   ISS     Renal  #LINNETTE   Cr elevated to 1.72 and improving to 1.42 after fluid resuscitation and levo      ID:   ESBL sensitive to zosyn but UA from sep 4th   watch renal function   repeat urine culture     Heme   nothing to do   DVT propylaxis    50 y/o man with PMH significant for cirrhosis, DM with previously untreated ESBL UTI, presenting with rigors, AMS, fever and hemodynamic instability with elevated WBC count likely due to multiorgan failure 2/2 UTI    Neuro   #Metabolic encephalopathy   Multifactorial 2/2 cirrhosis/ammonia, septic shock, alcohol withdrawal and malnourished (hypomagnesemia, hypophosphatemia)   - F/u Alcohol level   - F/u Ammonia level   - Replete electrolytes   - CIWA protocol (Ativan for CIWA >8)  - Start multivitamin, thiamine and folate    Pulmonary  Maintain SpO2>94%     CVS   #Septic shock with multiorgan failure   Patient meets 3 sepsis criteria (tachycardic, febrile) with low Bp, tachycardia, fever,  WBC of 10.74, lactate 2.5, elevated Cr (1.72), and elevated bili (1.5) and AST   likely source UTI (ESBL)         TTE on prior admission showed EF 55-60%   cardiac enzymes (trops were ok need to repeat in 2 hours)   can get more fluid c/w with levo     GI   #PUD   - Start pantoprazole   - Pt on a regular diet     #Cirrhosis   - C/w lactulose q8    Endo   ISS     Renal  #LINNETTE   Cr elevated to 1.72 and improving to 1.42 after fluid resuscitation and levo      ID:   ESBL sensitive to zosyn but UA from sep 4th   watch renal function   repeat urine culture     Heme   nothing to do   DVT propylaxis    52 y/o man with PMH significant for cirrhosis, DM with previously untreated ESBL UTI, presenting with rigors, AMS, fever and hemodynamic instability with elevated WBC count likely due to multiorgan failure 2/2 UTI    Neuro   #Metabolic encephalopathy   Multifactorial 2/2 cirrhosis/ammonia, septic shock, alcohol withdrawal and malnourished (hypomagnesemia, hypophosphatemia)   - F/u Alcohol level   - F/u Ammonia level   - Replete electrolytes   - CIWA protocol (Ativan for CIWA >8)  - Start multivitamin, thiamine and folate    Pulmonary  Maintain SpO2>94%   #TENZIN  - CPAP qhs    CVS   #Septic shock with multiorgan failure   likely source UTI (ESBL)   Patient meeting sepsis criteria (tachycardic, febrile)  He has low Bp, WBC of 10.74, lactate 2.5, elevated Cr (1.72), and elevated bili (1.5) and AST  Improving: Good UOP, Cr improved, bili improved, mental status is better, lactate cleared, euvolemic with good perfusion   - F/u urine and blood cultures   - C/w Zosyn   - Wean Levo as tolerated   - Patient tolerating PO no need for fluids at this time    GI   #PUD   - Start pantoprazole   - regular diet     #Cirrhosis   - C/w lactulose q8  - Abdominal US     Endo   #DM  A1C @6 on Sep 5th   Pt not on any meds at home  ISS     Renal  #LINNETTE   Cr elevated to 1.72 and improving to 1.42 after fluid resuscitation and levo  - F/u urine lytes   - Trend Cr     ID  #ESBL UTI  - F/u urine and blood cultures   - C/w Zosyn     Heme   #Anemia   Hx of alcoholism, cirrhosis, malnourished   - Iron studies   - c/w multivitamin, folic acid     F:  E: Replete PRN   DVT propylaxis    50 y/o man with PMH significant for cirrhosis, DM with previously untreated ESBL UTI, presenting with rigors, AMS, fever and hemodynamic instability with elevated WBC count likely due to multiorgan failure 2/2 UTI    Neuro   #Metabolic encephalopathy   Multifactorial 2/2 cirrhosis/ammonia, septic shock, alcohol withdrawal and malnourished (hypomagnesemia, hypophosphatemia)   - F/u Alcohol level   - F/u Ammonia level   - Replete electrolytes   - CIWA protocol (Ativan for CIWA >8)  - Start multivitamin, thiamine and folate    Pulmonary  Maintain SpO2>94%   #TENZIN  - CPAP qhs    CVS   #Septic shock with multiorgan failure   likely source UTI (ESBL)   Patient meeting sepsis criteria (tachycardic, febrile)  He has low Bp, WBC of 10.74, lactate 2.5, elevated Cr (1.72), and elevated bili (1.5) and AST  Improving: Good UOP, Cr improved, bili improved, mental status is better, lactate cleared, euvolemic with good perfusion   - F/u urine and blood cultures   - C/w Zosyn   - Wean Levo as tolerated   - Patient tolerating PO no need for fluids at this time    GI   #PUD   - Start pantoprazole   - regular diet     #Cirrhosis   - C/w lactulose q8  - Abdominal US     Endo   #DM  A1C @6 on Sep 5th   Pt not on any meds at home  ISS     Renal  #LINNETTE   Cr elevated to 1.72 and improving to 1.42 after fluid resuscitation and levo  - F/u urine lytes   - Trend Cr     ID  #ESBL UTI  - F/u urine and blood cultures   - C/w Zosyn     Heme   #Anemia   Hx of alcoholism, cirrhosis, malnourished   - Iron studies   - c/w multivitamin, folic acid     F:  E: Replete PRN   DVT propylaxis    52 y/o man with PMH significant for cirrhosis, DM with previously untreated ESBL UTI, presenting with rigors, AMS, fever and hemodynamic instability with elevated WBC count likely due to multiorgan failure 2/2 UTI    Neuro   #Metabolic encephalopathy   Multifactorial 2/2 cirrhosis/ammonia, septic shock, alcohol withdrawal and malnourished (hypomagnesemia, hypophosphatemia)   - F/u Alcohol level   - F/u Ammonia level   - Replete electrolytes   - CIWA protocol (Ativan for CIWA >8)  - Start multivitamin, thiamine and folate    Pulmonary  Maintain SpO2>94%   #TENZIN  - CPAP qhs    CVS   #Septic shock with multiorgan failure   likely source UTI (ESBL)   Patient meeting sepsis criteria (tachycardic, febrile)  He has low Bp, WBC of 10.74, lactate 2.5, elevated Cr (1.72), and elevated bili (1.5) and AST  Improving: Good UOP, Cr improved, bili improved, mental status is better, lactate cleared, euvolemic with good perfusion   - F/u urine and blood cultures   - C/w Zosyn   - Wean Levo as tolerated   - Patient tolerating PO no need for fluids at this time    GI   #PUD   - Start pantoprazole   - regular diet     #Cirrhosis   - C/w lactulose q8  - Abdominal US     Endo   #DM  A1C @6 on Sep 5th   Pt not on any meds at home  ISS     Renal  #LINNETTE   Cr elevated to 1.72 and improving to 1.42 after fluid resuscitation and levo  - F/u urine lytes   - Trend Cr     ID  #ESBL UTI  - F/u urine and blood cultures   - C/w Zosyn     Heme   #Anemia   Hx of alcoholism, cirrhosis, malnourished   - F/u Iron studies   - c/w multivitamin, folic acid     F: tolerating PO, ondansetron for Nausea (PRN) QTc was 450   E: Replete PRN   DVT propylaxis:    50 y/o man with PMH significant for cirrhosis, DM with previously untreated ESBL UTI, presenting with rigors, AMS, fever and hemodynamic instability with elevated WBC count likely due to multiorgan failure 2/2 UTI    Neuro   #Metabolic encephalopathy   Multifactorial 2/2 cirrhosis/ammonia, septic shock, alcohol withdrawal and malnourished (hypomagnesemia, hypophosphatemia)   - F/u Alcohol level   - F/u Ammonia level   - Replete electrolytes   - CIWA protocol (Ativan for CIWA >8)  - Start multivitamin, thiamine and folate    Pulmonary  Maintain SpO2>94%   #TENZIN  - CPAP qhs    CVS   #Septic shock with multiorgan failure   likely source UTI (ESBL)   Patient meeting sepsis criteria (tachycardic, febrile)  He has low Bp, WBC of 10.74, lactate 2.5, elevated Cr (1.72), and elevated bili (1.5) and AST  Improving: Good UOP, Cr improved, bili improved, mental status is better, lactate cleared, euvolemic with good perfusion   - F/u urine and blood cultures   - C/w Zosyn   - Wean Levo as tolerated, MAP >65  - Patient tolerating PO no need for fluids at this time    GI   #PUD   - Start pantoprazole   - regular diet     #Cirrhosis   - C/w lactulose q8  - Abdominal US     Endo   #DM  A1C @6 on Sep 5th   Pt not on any meds at home  ISS     Renal  #LINNETTE   Cr elevated to 1.72 and improving to 1.42 after fluid resuscitation and levo  - F/u urine lytes   - Trend Cr     ID  #ESBL UTI  - F/u urine and blood cultures   - C/w Zosyn     Heme   #Anemia   Hx of alcoholism, cirrhosis, malnourished   - F/u Iron studies   - c/w multivitamin, folic acid     F: tolerating PO, ondansetron for Nausea (PRN) QTc was 450   E: Replete PRN   DVT propylaxis: SCDs  GI prophylaxis: Pantoprazole      52 y/o man with PMH significant for cirrhosis, DM with previously untreated ESBL UTI, presenting with rigors, AMS, fever and hemodynamic instability with elevated WBC count likely due to multiorgan failure 2/2 UTI    Neuro   #Metabolic encephalopathy   Multifactorial 2/2 cirrhosis/ammonia, septic shock, alcohol withdrawal and malnourished (hypomagnesemia, hypophosphatemia)   - F/u Alcohol level   - F/u Ammonia level   - Replete electrolytes   - CIWA protocol (Ativan for CIWA >8)  - Start multivitamin, thiamine and folate    Pulmonary  Maintain SpO2>94%   #TENZIN  - CPAP qhs    CVS   #Septic shock with multiorgan failure   likely source UTI (ESBL)   Patient meeting sepsis criteria (tachycardic, febrile)  He has low Bp, WBC of 10.74, lactate 2.5, elevated Cr (1.72), and elevated bili (1.5) and AST  Improving: Good UOP, Cr improved, bili improved, mental status is better, lactate cleared, euvolemic with good perfusion   - F/u urine and blood cultures   - C/w Zosyn   - Wean Levo as tolerated, MAP >65  - Patient tolerating PO no need for fluids at this time    GI   #PUD   - Start pantoprazole   - regular diet     #Cirrhosis   - C/w lactulose q8  - Abdominal US     Endo   #DM  A1C @6 on Sep 5th   Pt not on any meds at home  ISS     Renal  #LINNETTE   Cr elevated to 1.72 and improving to 1.42 after fluid resuscitation and levo  - F/u urine lytes   - Trend Cr     ID  #ESBL UTI  - F/u urine and blood cultures   - C/w Zosyn     Heme   #Anemia   Hx of alcoholism, cirrhosis, malnourished   - F/u Iron studies   - c/w multivitamin, folic acid     F: tolerating PO, ondansetron for Nausea (PRN) QTc was 450   E: Replete PRN   DVT propylaxis: SCDs  GI prophylaxis: Pantoprazole

## 2022-09-09 NOTE — H&P ADULT - NS ATTEST RISK PROBLEM GEN_ALL_CORE FT
Hypotensive after fluid resuscitation, pressor requirement, other complications of liver cirrhosis with ascites.

## 2022-09-09 NOTE — H&P ADULT - NSHPSOCIALHISTORY_GEN_ALL_CORE
Patient live in a shelter. He has smoked a pack of cigarettes a day for 28 years   He does not drink alcohol anymore. Has stopped for the past 7 months after learning that he has liver cirrhosis   He does not use drugs but tried cocain 1 month ago Patient live in a shelter. He has smoked a pack of cigarettes a day for 28 years   He does not drink alcohol anymore. Has stopped for the past 7 months after learning that he has liver cirrhosis   He does not use drugs but tried cocaine 1 month ago

## 2022-09-09 NOTE — ED PROVIDER NOTE - CARE PLAN
Principal Discharge DX:	Fatigue   1 Principal Discharge DX:	Septic shock  Secondary Diagnosis:	Uremia  Secondary Diagnosis:	Altered mental status

## 2022-09-09 NOTE — ED ADULT NURSE REASSESSMENT NOTE - COMFORT CARE
plan of care explained/repositioned/side rails up
plan of care explained/wait time explained/warm blanket provided

## 2022-09-09 NOTE — ED PROVIDER NOTE - PROGRESS NOTE DETAILS
ICU attending aware Dr Davis pt accepted to the ICU for admission Pt BP is 87/48 pt is somnolent but arrousable with no focal deficits, reports feeling generally unwell will start pt on Norepi and discuss case with the icu.

## 2022-09-09 NOTE — H&P ADULT - ATTENDING COMMENTS
Joce Stack  [id number] Information obtained from ED physician at [facility].  This is a 52 y/o male with a PMH of liver cirrhosis with abdominal ascites, tobacco use, DM, TENZIN, asthma, gastric bypass and a recent UTI which was untreated as he could not be reached after he was d/c from the hospital.  Today he presented with AMS, rigors, normotensive but became hypotensive to 88/47 for which he received 2.8l IVF and was started on levophed.  Since he was not treated for a positive U/A with esbl he was given zosyn and bactrim and ceftriaxone.  -metabolic encephalopathy, wax and wanes as per the ED physician he was somnolent  -UTI with septic shock - he had (+) U/A and was not treated  -septic shock, on levophed  -liver cirrhosis with abdominal ascites  C/w broad spectrum antibiotics, f/u cultures to de escalate, maintain MAP 65 mmHg and above; titrated the levophed, f/u ammonia level.  This patient was accepted to the ICU as he is on levophed. He is at moderate level of complexity. Joce Stack  [id number] Information obtained from ED physician at [facility].  This is a 52 y/o male with a PMH of liver cirrhosis with abdominal ascites, tobacco use, DM, TENZIN, asthma, gastric bypass and a recent UTI which was untreated as he could not be reached after he was d/c from the hospital.  Today he presented with AMS, rigors, normotensive but became hypotensive to 88/47 for which he received 2.8l IVF and was started on levophed.  Since he was not treated for a positive U/A with esbl he was given zosyn and bactrim and ceftriaxone.  -metabolic encephalopathy, wax and wanes as per the ED physician he was somnolent  -UTI with septic shock - he had (+) U/A and was not treated  -septic shock, on levophed  -liver cirrhosis with abdominal ascites  C/w broad spectrum antibiotics, f/u cultures to de escalate, maintain MAP 65 mmHg and above; titrated the levophed, f/u ammonia level.  This patient was accepted to the ICU as he is on levophed. He is at moderate level of complexity. Joce Stack  [id number] Information obtained from ED physician at [facility].  This is a 52 y/o male with a PMH of liver cirrhosis with abdominal ascites, tobacco use, DM, TENZIN, asthma, gastric bypass and a recent UTI which was untreated as he could not be reached after he was d/c from the hospital.  Today he presented with AMS, rigors, normotensive but became hypotensive to 88/47 for which he received 2.8l IVF and was started on levophed.  Since he was not treated for a positive U/A with esbl he was given zosyn and bactrim and ceftriaxone.  -metabolic encephalopathy, wax and wanes as per the ED physician he was somnolent  -UTI with septic shock - he had (+) U/A and was not treated  -septic shock, on levophed  -liver cirrhosis with abdominal ascites  C/w broad spectrum antibiotics, f/u cultures to de escalate, maintain MAP 65 mmHg and above; titrated the levophed, f/u ammonia level.  This patient was accepted to the ICU as he is on levophed. He is at moderate level of complexity. Joce Stack  [id number] Information obtained from ED physician at [facility].  This is a 50 y/o male with a PMH of liver cirrhosis with abdominal ascites, tobacco use, DM, TENZIN, asthma, gastric bypass and a recent UTI which was untreated as he could not be reached after he was d/c from the hospital.  Today he presented with AMS, rigors, normotensive but became hypotensive to 88/47 for which he received 2.8l IVF and was started on levophed.  Since he was not treated for a positive U/A with esbl he was given zosyn and bactrim and ceftriaxone.  -metabolic encephalopathy, wax and wanes as per the ED physician he was somnolent  -UTI with septic shock - he had (+) U/A and was not treated  -septic shock, on levophed  -liver cirrhosis with abdominal ascites  C/w broad spectrum antibiotics, f/u cultures to de escalate, maintain MAP 65 mmHg and above; titrated the levophed, f/u ammonia level.  This patient was accepted to the ICU as he is on levophed. He is at low level of complexity. Joce Stack  [id number] Information obtained from ED physician at [facility].  This is a 50 y/o male with a PMH of liver cirrhosis with abdominal ascites, tobacco use, DM, TENZIN, asthma, gastric bypass and a recent UTI which was untreated as he could not be reached after he was d/c from the hospital.  Today he presented with AMS, rigors, normotensive but became hypotensive to 88/47 for which he received 2.8l IVF and was started on levophed.  Since he was not treated for a positive U/A with esbl he was given zosyn and bactrim and ceftriaxone.  -metabolic encephalopathy, wax and wanes as per the ED physician he was somnolent  -UTI with septic shock - he had (+) U/A and was not treated  -septic shock, on levophed  -liver cirrhosis with abdominal ascites  C/w broad spectrum antibiotics, f/u cultures to de escalate, maintain MAP 65 mmHg and above; titrated the levophed, f/u ammonia level.  This patient was accepted to the ICU as he is on levophed. He is at low level of complexity. Joce Stack  [id number] Information obtained from ED physician at [facility].  This is a 52 y/o male with a PMH of liver cirrhosis with abdominal ascites, tobacco use, DM, TENZIN, asthma, gastric bypass and a recent UTI which was untreated as he could not be reached after he was d/c from the hospital.  Today he presented with AMS, rigors, normotensive but became hypotensive to 88/47 for which he received 2.8l IVF and was started on levophed.  Since he was not treated for a positive U/A with esbl he was given zosyn and bactrim and ceftriaxone.  -metabolic encephalopathy, wax and wanes as per the ED physician he was somnolent  -UTI with septic shock - he had (+) U/A and was not treated  -septic shock, on levophed  -liver cirrhosis with abdominal ascites  C/w broad spectrum antibiotics, f/u cultures to de escalate, maintain MAP 65 mmHg and above; titrated the levophed, f/u ammonia level.  This patient was accepted to the ICU as he is on levophed. He is at low level of complexity.

## 2022-09-09 NOTE — H&P ADULT - NSHPPHYSICALEXAM_GEN_ALL_CORE
PHYSICAL EXAM  General: NAD  Head: NC/AT; MMM; PERRL; EOMI;  Neck: Supple; no JVD  Respiratory: CTAB; no wheezes/rales/rhonchi  Cardiovascular: Regular rhythm/rate; S1/S2+, no murmurs, rubs gallops   Gastrointestinal: Soft; NTND; bowel sounds normal and present  Extremities: WWP; no edema/cyanosis  Neurological: A&Ox3, CNII-XII grossly intact; no obvious focal deficits PHYSICAL EXAM  General: In bed, with rigors, somnolent, in pain,  Eyes: Round reactive to light, mild scleral icterus   Head: NC/AT; MMM; PERRL; EOMI;  Neck: Supple; no JVD  Cardiovascular: Regular rhythm/rate; S1/S2+, no murmurs, rubs gallops   Respiratory: CTAB; no wheezes/rales/rhonchi  Gastrointestinal: Soft; distended with tenderness in RUQ, LUQ and epigastric area; bowel sounds normal and present  Extremities: WWP; no edema/cyanosis  Neurological: A&Ox3, CNII-XII grossly intact; no obvious focal deficits. No asterixis

## 2022-09-09 NOTE — ED ADULT NURSE NOTE - NSSUHOSCREENINGYN_ED_ALL_ED
Patient did not have a ride and rescheduled for 2/19/18 at 4 pm    No - the patient is unable to be screened due to medical condition

## 2022-09-09 NOTE — ED ADULT TRIAGE NOTE - CHIEF COMPLAINT QUOTE
BIBA from shelter c/o generalized malaise, nausea and fevers x 1 day. SIRS criteria met, protocol initiated and followed. recently dc'd from [facility] BIBA from shelter c/o generalized malaise, nausea and fevers x 1 day. SIRS criteria met, protocol initiated and followed. recently dc'd from [facility]

## 2022-09-09 NOTE — ED ADULT NURSE NOTE - NSIMPLEMENTINTERV_GEN_ALL_ED
Implemented All Universal Safety Interventions:  [place] to call system. Call bell, personal items and telephone within reach. Instruct patient to call for assistance. Room bathroom lighting operational. Non-slip footwear when patient is off stretcher. Physically safe environment: no spills, clutter or unnecessary equipment. Stretcher in lowest position, wheels locked, appropriate side rails in place. Implemented All Universal Safety Interventions:  [place] to call system. Call bell, personal items and telephone within reach. Instruct patient to call for assistance. Room bathroom lighting operational. Non-slip footwear when patient is off stretcher. Physically safe environment: no spills, clutter or unnecessary equipment. Stretcher in lowest position, wheels locked, appropriate side rails in place. Implemented All Universal Safety Interventions:  [place] to call system. Call bell, personal items and telephone within reach. Instruct patient to call for assistance. Room bathroom lighting operational. Non-slip footwear when patient is off stretcher. Physically safe environment: no spills, clutter or unnecessary equipment. Stretcher in lowest position, wheels locked, appropriate side rails in place.

## 2022-09-09 NOTE — H&P ADULT - NSHPLABSRESULTS_GEN_ALL_CORE
LABS:  cret                        9.2    10.74 )-----------( 79       ( 09 Sep 2022 15:15 )             27.6     09-09    126<L>  |  90<L>  |  35<H>  ----------------------------<  193<H>  4.4   |  23  |  1.72<H>    Ca    9.3      09 Sep 2022 15:15  Mg     1.1     09-09    TPro  8.3<H>  /  Alb  3.4  /  TBili  1.5<H>  /  DBili  x   /  AST  39<H>  /  ALT  18  /  AlkPhos  83  09-09    PT/INR - ( 09 Sep 2022 15:15 )   PT: 19.5 sec;   INR: 1.67          PTT - ( 09 Sep 2022 15:15 )  PTT:30.6 sec

## 2022-09-09 NOTE — ED PROVIDER NOTE - CLINICAL SUMMARY MEDICAL DECISION MAKING FREE TEXT BOX
51y yo Male with PMH of ex-alcoholism (sober for 7 months), cirrhosis, smoking (35-40 pack year history), DM, TENZIN (no longer on CPAP due to insurance issues), asthma, PUD, abdominal hernia (per patient), chronic back pain, Hx of gastric bypass, and Hx of multiple paracentesis  now here for fatigue,malaise and fever.     recent urine culture positive for ESBL   Patient with fever and tachycardia in ED. treated with sepsis protocol   Will admit for sepsis s/p ESBL

## 2022-09-09 NOTE — ED PROVIDER NOTE - OBJECTIVE STATEMENT
51y yo Male with PMH of ex-alcoholism (sober for 7 months), cirrhosis, smoking (35-40 pack year history), DM, TENZIN (no longer on CPAP due to insurance issues), asthma, PUD, abdominal hernia (per patient), chronic back pain, Hx of gastric bypass, and Hx of multiple paracentesis now here with fatigue, chills, and fever stating he has not been feeling well. Patient with recent admission for similar complaints. Recent positive urine culture for ESBL. Patient treated with sepsis criteria

## 2022-09-09 NOTE — H&P ADULT - HISTORY OF PRESENT ILLNESS
50 y/o man with PMH of alcoholism (sober for 7 months), cirrhosis, smoking (35-40 pack year), DM, TENZIN (no longer on CPAP due to insurance issues), asthma, PUD, abdominal hernia (per patient), chronic back pain, Hx of gastric bypass, and Hx of multiple paracentesis to abdomen presenting for rigors and somnolence. He was initially hemodynamically stable but on presentation his blood pressure dropped to 88/47, he was given 2.8L but his blood pressure remained low at 89/50 and he was started on levophed   Of note he usually follows at [facility] where he gets his medication refills and paracentesis every few months. He was admitted on September 4th for generalized weakness, fatigue, decreased appetite, generalized abdominal discomfort, at the time he had some insurance issues which prevented him from getting his med refill and paracentesis for 2 months. It was noted that during transport from [facility] to [facility] he was hypotensive Bp 80/46, was asymptomatic, was given 1L NS which did not improve his Bp. His utox was positive for THC and cocain and was found to have an LINNETTE (Cr 3.5), nephrology and GI consulted and given albumin (100cc) with improvement in Cr (to 1.22 on 9/6), UOP and Bp. CT A/P showed hepatic cirrhosis, small ascites, spenomegaly, anasarca suggesting portal hypertension/volume overload. His US doppler of the abdomen showed normal flow in IVC, portal, hepatic and splenic veins.  There were no concerns for SBP (no WBC count or Fevers), did not receive paracentesis (minimal ascites not amenable to drainage according to IR) and no abx were started. He developped asterixis on 9/6 was started on lactulose and discharged w/ plan to f/u with GI in a week to resume spironalactone which was stopped on D/c. His MELD score on discharge was 16. However, urine culture from 9/4 grew ESBL.     In the ED:  VS:   T 102.5, , Bp 131/85==>88/47==>89/40, spO2 97  Labs: WBC 10.47,      50 y/o man with PMH of alcoholism (sober for 7 months), cirrhosis, smoking (35-40 pack year), DM, TENZIN (no longer on CPAP due to insurance issues), asthma, PUD, abdominal hernia (per patient), chronic back pain, Hx of gastric bypass, and Hx of multiple paracentesis to abdomen presenting for rigors and somnolence. He was initially hemodynamically stable but on presentation his blood pressure dropped to 88/47, he was given 2.8L but his blood pressure remained low at 89/50 and he was started on levophed   Of note he usually follows at [facility] where he gets his medication refills and paracentesis every few months. He was admitted on September 4th for generalized weakness, fatigue, decreased appetite, generalized abdominal discomfort, at the time he had some insurance issues which prevented him from getting his med refill and paracentesis for 2 months. It was noted that during transport from [facility] to [facility] he was hypotensive Bp 80/46, was asymptomatic, was given 1L NS which did not improve his Bp. His utox was positive for THC and cocain and was found to have an LINNETTE (Cr 3.5), nephrology and GI consulted and given albumin (100cc) with improvement in Cr (to 1.22 on 9/6), UOP and Bp. CT A/P showed hepatic cirrhosis, small ascites, spenomegaly, anasarca suggesting portal hypertension/volume overload. His US doppler of the abdomen showed normal flow in IVC, portal, hepatic and splenic veins.  There were no concerns for SBP (no WBC count or Fevers), did not receive paracentesis (minimal ascites not amenable to drainage according to IR) and no abx were started. He developped asterixis on 9/6 was started on lactulose and discharged w/ plan to f/u with GI in a week to resume spironalactone which was stopped on D/c. His MELD score on discharge was 16. However, urine culture from 9/4 grew ESBL.     In the ED:  VS:   T 102.5, , Bp 131/85==>88/47==>89/40, spO2 97  Labs: WBC 10.47,      52 y/o man with PMH of alcoholism (sober for 7 months), cirrhosis, smoking (35-40 pack year), DM, TENZIN (no longer on CPAP due to insurance issues), asthma, PUD, abdominal hernia (per patient), chronic back pain, Hx of gastric bypass, and Hx of multiple paracentesis to abdomen presenting for rigors and somnolence. He was initially hemodynamically stable but on presentation his blood pressure dropped to 88/47, he was given 2.8L but his blood pressure remained low at 89/50 and he was started on levophed   Of note he usually follows at [facility] where he gets his medication refills and paracentesis every few months. He was admitted on September 4th for generalized weakness, fatigue, decreased appetite, generalized abdominal discomfort, at the time he had some insurance issues which prevented him from getting his med refill and paracentesis for 2 months. It was noted that during transport from [facility] to [facility] he was hypotensive Bp 80/46, was asymptomatic, was given 1L NS which did not improve his Bp. His utox was positive for THC and cocain and was found to have an LINNETTE (Cr 3.5), nephrology and GI consulted and given albumin (100cc) with improvement in Cr (to 1.22 on 9/6), UOP and Bp. CT A/P showed hepatic cirrhosis, small ascites, spenomegaly, anasarca suggesting portal hypertension/volume overload. His US doppler of the abdomen showed normal flow in IVC, portal, hepatic and splenic veins.  There were no concerns for SBP (no WBC count or Fevers), did not receive paracentesis (minimal ascites not amenable to drainage according to IR) and no abx were started. He developped asterixis on 9/6 was started on lactulose and discharged w/ plan to f/u with GI in a week to resume spironalactone which was stopped on D/c. His MELD score on discharge was 16. However, urine culture from 9/4 grew ESBL.     In the ED:  VS:   T 102.5, , Bp 131/85==>88/47==>89/40, spO2 97  Labs: WBC 10.47,      52 y/o man with PMH of alcoholism (sober for 7 months), cirrhosis, smoking (35-40 pack year), DM, TENZIN (no longer on CPAP due to insurance issues), asthma, PUD, abdominal hernia (per patient), chronic back pain, Hx of gastric bypass, and Hx of multiple paracentesis to abdomen presenting for rigors and somnolence. He was initially hemodynamically stable but on presentation his blood pressure dropped to 88/47, he was given 2.8L but his blood pressure remained low at 89/50 and he was started on levophed   Of note he usually follows at [facility] where he gets his medication refills and paracentesis every few months. He was admitted on September 4th for generalized weakness, fatigue, decreased appetite, generalized abdominal discomfort, at the time he had some insurance issues which prevented him from getting his med refill and paracentesis for 2 months. It was noted that during transport from [facility] to [facility] he was hypotensive Bp 80/46, was asymptomatic, was given 1L NS which did not improve his Bp. His utox was positive for THC and cocain and was found to have an LINNETTE (Cr 3.5), nephrology and GI consulted and given albumin (100cc) with improvement in Cr (to 1.22 on 9/6), UOP and Bp. CT A/P showed hepatic cirrhosis, small ascites, spenomegaly, anasarca suggesting portal hypertension/volume overload. His US doppler of the abdomen showed normal flow in IVC, portal, hepatic and splenic veins.  There were no concerns for SBP (no WBC count or Fevers), did not receive paracentesis (minimal ascites not amenable to drainage according to IR) and no abx were started. He developped asterixis on 9/6 was started on lactulose and discharged w/ plan to f/u with GI in a week to resume spironalactone which was stopped on D/c. His MELD score on discharge was 16. However, urine culture from 9/4 grew ESBL.     In the ED:  VS:   T 102.5, , Bp 131/85==>88/47==>89/40, spO2 97  Labs: WBC 10.47, Hb 9.2 (baseline), Plts 79, PT 19.5, INR 1.67     50 y/o man with PMH of alcoholism (sober for 7 months), cirrhosis, smoking (35-40 pack year), DM, TENZIN (no longer on CPAP due to insurance issues), asthma, PUD, abdominal hernia (per patient), chronic back pain, Hx of gastric bypass, and Hx of multiple paracentesis to abdomen presenting for rigors and somnolence. He was initially hemodynamically stable but on presentation his blood pressure dropped to 88/47, he was given 2.8L but his blood pressure remained low at 89/50 and he was started on levophed   Of note he usually follows at [facility] where he gets his medication refills and paracentesis every few months. He was admitted on September 4th for generalized weakness, fatigue, decreased appetite, generalized abdominal discomfort, at the time he had some insurance issues which prevented him from getting his med refill and paracentesis for 2 months. It was noted that during transport from [facility] to [facility] he was hypotensive Bp 80/46, was asymptomatic, was given 1L NS which did not improve his Bp. His utox was positive for THC and cocain and was found to have an LINNETTE (Cr 3.5), nephrology and GI consulted and given albumin (100cc) with improvement in Cr (to 1.22 on 9/6), UOP and Bp. CT A/P showed hepatic cirrhosis, small ascites, spenomegaly, anasarca suggesting portal hypertension/volume overload. His US doppler of the abdomen showed normal flow in IVC, portal, hepatic and splenic veins.  There were no concerns for SBP (no WBC count or Fevers), did not receive paracentesis (minimal ascites not amenable to drainage according to IR) and no abx were started. He developped asterixis on 9/6 was started on lactulose and discharged w/ plan to f/u with GI in a week to resume spironalactone which was stopped on D/c. His MELD score on discharge was 16. However, urine culture from 9/4 grew ESBL.     In the ED:  VS:   T 102.5, , Bp 131/85==>88/47==>89/40, spO2 97  Labs: WBC 10.47, Hb 9.2 (baseline), Plts 79, PT 19.5, INR 1.67     52 y/o man with PMH of alcoholism (sober for 7 months), cirrhosis, smoking (35-40 pack year), DM, TENZIN (no longer on CPAP due to insurance issues), asthma, PUD, abdominal hernia (per patient), chronic back pain, Hx of gastric bypass, and Hx of multiple paracentesis to abdomen presenting for rigors and somnolence. He was initially hemodynamically stable but on presentation his blood pressure dropped to 88/47, he was given 2.8L but his blood pressure remained low at 89/50 and he was started on levophed   Of note he usually follows at [facility] where he gets his medication refills and paracentesis every few months. He was admitted on September 4th for generalized weakness, fatigue, decreased appetite, generalized abdominal discomfort, at the time he had some insurance issues which prevented him from getting his med refill and paracentesis for 2 months. It was noted that during transport from [facility] to [facility] he was hypotensive Bp 80/46, was asymptomatic, was given 1L NS which did not improve his Bp. His utox was positive for THC and cocain and was found to have an LINNETTE (Cr 3.5), nephrology and GI consulted and given albumin (100cc) with improvement in Cr (to 1.22 on 9/6), UOP and Bp. CT A/P showed hepatic cirrhosis, small ascites, spenomegaly, anasarca suggesting portal hypertension/volume overload. His US doppler of the abdomen showed normal flow in IVC, portal, hepatic and splenic veins.  There were no concerns for SBP (no WBC count or Fevers), did not receive paracentesis (minimal ascites not amenable to drainage according to IR) and no abx were started. He developped asterixis on 9/6 was started on lactulose and discharged w/ plan to f/u with GI in a week to resume spironalactone which was stopped on D/c. His MELD score on discharge was 16. However, urine culture from 9/4 grew ESBL.     In the ED:  VS:   T 102.5, , Bp 131/85==>88/47==>89/40, spO2 97  Labs: WBC 10.47, Hb 9.2 (baseline), Plts 79, PT 19.5, INR 1.67     50 y/o man with PMH of alcoholism (sober for 7 months), cirrhosis, smoking (35-40 pack year), DM, TENZIN (no longer on CPAP due to insurance issues), asthma, PUD, abdominal hernia (per patient), chronic back pain, Hx of gastric bypass, and Hx of multiple paracentesis to abdomen presenting for rigors and somnolence. He was initially hemodynamically stable but on presentation his blood pressure dropped to 88/47, he was given 2.8L but his blood pressure remained low at 89/50 and he was started on levophed   Of note he usually follows at [facility] where he gets his medication refills and paracentesis every few months. He was admitted on September 4th for generalized weakness, fatigue, decreased appetite, generalized abdominal discomfort, at the time he had some insurance issues which prevented him from getting his med refill and paracentesis for 2 months. It was noted that during transport from [facility] to [facility] he was hypotensive Bp 80/46, was asymptomatic, was given 1L NS which did not improve his Bp. His utox was positive for THC and cocain and was found to have an LINNETTE (Cr 3.5), nephrology and GI consulted and given albumin (100cc) with improvement in Cr (to 1.22 on 9/6), UOP and Bp. CT A/P showed hepatic cirrhosis, small ascites, spenomegaly, anasarca suggesting portal hypertension/volume overload. His US doppler of the abdomen showed normal flow in IVC, portal, hepatic and splenic veins.  There were no concerns for SBP (no WBC count or Fevers), did not receive paracentesis (minimal ascites not amenable to drainage according to IR) and no abx were started. He developped asterixis on 9/6 was started on lactulose and discharged w/ plan to f/u with GI in a week to resume spironalactone which was stopped on D/c. His MELD score on discharge was 16. However, urine culture from 9/4 grew ESBL.     In the ED:  VS:   T 102.5, , Bp 131/85==>88/47==>89/40, spO2 97  Labs: WBC 10.47, Hb 9.2 (baseline), Plts 79, PT 19.5, INR 1.67, Na 126, Cl 90,      52 y/o man with PMH of alcoholism (sober for 7 months), cirrhosis, smoking (35-40 pack year), DM, TENZIN (no longer on CPAP due to insurance issues), asthma, PUD, abdominal hernia (per patient), chronic back pain, Hx of gastric bypass, and Hx of multiple paracentesis to abdomen presenting for rigors and somnolence. He was initially hemodynamically stable but on presentation his blood pressure dropped to 88/47, he was given 2.8L but his blood pressure remained low at 89/50 and he was started on levophed   Of note he usually follows at [facility] where he gets his medication refills and paracentesis every few months. He was admitted on September 4th for generalized weakness, fatigue, decreased appetite, generalized abdominal discomfort, at the time he had some insurance issues which prevented him from getting his med refill and paracentesis for 2 months. It was noted that during transport from [facility] to [facility] he was hypotensive Bp 80/46, was asymptomatic, was given 1L NS which did not improve his Bp. His utox was positive for THC and cocain and was found to have an LINNETTE (Cr 3.5), nephrology and GI consulted and given albumin (100cc) with improvement in Cr (to 1.22 on 9/6), UOP and Bp. CT A/P showed hepatic cirrhosis, small ascites, spenomegaly, anasarca suggesting portal hypertension/volume overload. His US doppler of the abdomen showed normal flow in IVC, portal, hepatic and splenic veins.  There were no concerns for SBP (no WBC count or Fevers), did not receive paracentesis (minimal ascites not amenable to drainage according to IR) and no abx were started. He developped asterixis on 9/6 was started on lactulose and discharged w/ plan to f/u with GI in a week to resume spironalactone which was stopped on D/c. His MELD score on discharge was 16. However, urine culture from 9/4 grew ESBL.     In the ED:  VS:   T 102.5, , Bp 131/85==>88/47==>89/40, spO2 97  Labs: WBC 10.47, Hb 9.2 (baseline), Plts 79, PT 19.5, INR 1.67, Na 126, Cl 90,      50 y/o man with PMH of alcoholism (sober for 7 months), cirrhosis, smoking (35-40 pack year), DM, TENZIN (no longer on CPAP due to insurance issues), asthma, PUD, abdominal hernia (per patient), chronic back pain, Hx of gastric bypass, and Hx of multiple paracentesis to abdomen presenting for rigors and somnolence. He was initially hemodynamically stable but on presentation his blood pressure dropped to 88/47, he was given 2.8L but his blood pressure remained low at 89/50 and he was started on levophed   Of note he usually follows at [facility] where he gets his medication refills and paracentesis every few months. He was admitted on September 4th for generalized weakness, fatigue, decreased appetite, generalized abdominal discomfort, at the time he had some insurance issues which prevented him from getting his med refill and paracentesis for 2 months. It was noted that during transport from [facility] to [facility] he was hypotensive Bp 80/46, was asymptomatic, was given 1L NS which did not improve his Bp. His utox was positive for THC and cocain and was found to have an LINNETTE (Cr 3.5), nephrology and GI consulted and given albumin (100cc) with improvement in Cr (to 1.22 on 9/6), UOP and Bp. CT A/P showed hepatic cirrhosis, small ascites, spenomegaly, anasarca suggesting portal hypertension/volume overload. His US doppler of the abdomen showed normal flow in IVC, portal, hepatic and splenic veins.  There were no concerns for SBP (no WBC count or Fevers), did not receive paracentesis (minimal ascites not amenable to drainage according to IR) and no abx were started. He developped asterixis on 9/6 was started on lactulose and discharged w/ plan to f/u with GI in a week to resume spironalactone which was stopped on D/c. His MELD score on discharge was 16. However, urine culture from 9/4 grew ESBL.     In the ED:  VS:   T 102.5, , Bp 131/85==>88/47==>89/40, spO2 97  Labs: WBC 10.47, Hb 9.2 (baseline), Plts 79, PT 19.5, INR 1.67, Na 126, Cl 90,      50 y/o man with PMH of alcoholism (sober for 7 months), cirrhosis, smoking (35-40 pack year), DM, TENZIN (no longer on CPAP due to insurance issues), asthma, PUD, abdominal hernia (per patient), chronic back pain, Hx of gastric bypass, and Hx of multiple paracentesis to abdomen presenting for rigors and somnolence. He was initially hemodynamically stable but on presentation his blood pressure dropped to 88/47, he was given 2.8L but his blood pressure remained low at 89/50 and he was started on levophed   Of note he usually follows at [facility] where he gets his medication refills and paracentesis every few months. He was admitted on September 4th for generalized weakness, fatigue, decreased appetite, generalized abdominal discomfort, at the time he had some insurance issues which prevented him from getting his med refill and paracentesis for 2 months. It was noted that during transport from [facility] to [facility] he was hypotensive Bp 80/46, was asymptomatic, was given 1L NS which did not improve his Bp. His utox was positive for THC and cocain and was found to have an LINNETTE (Cr 3.5), nephrology and GI consulted and given albumin (100cc) with improvement in Cr (to 1.22 on 9/6), UOP and Bp. CT A/P showed hepatic cirrhosis, small ascites, spenomegaly, anasarca suggesting portal hypertension/volume overload. His US doppler of the abdomen showed normal flow in IVC, portal, hepatic and splenic veins.  There were no concerns for SBP (no WBC count or Fevers), did not receive paracentesis (minimal ascites not amenable to drainage according to IR) and no abx were started. He developped asterixis on 9/6 was started on lactulose and discharged w/ plan to f/u with GI in a week to resume spironalactone which was stopped on D/c. His MELD score on discharge was 16. However, urine culture from 9/4 grew ESBL.     In the ED:  VS:   T 102.5, , Bp 131/85==>88/47==>89/40, spO2 97  Labs: WBC 10.47, Hb 9.2 (baseline), Plts 79, PT 19.5, INR 1.67, Na 126, Cl 90, Cr 1.72 bili 1.5,      52 y/o man with PMH of alcoholism (sober for 7 months), cirrhosis, smoking (35-40 pack year), DM, TENZIN (no longer on CPAP due to insurance issues), asthma, PUD, abdominal hernia (per patient), chronic back pain, Hx of gastric bypass, and Hx of multiple paracentesis to abdomen presenting for rigors and somnolence. He was initially hemodynamically stable but on presentation his blood pressure dropped to 88/47, he was given 2.8L but his blood pressure remained low at 89/50 and he was started on levophed   Of note he usually follows at [facility] where he gets his medication refills and paracentesis every few months. He was admitted on September 4th for generalized weakness, fatigue, decreased appetite, generalized abdominal discomfort, at the time he had some insurance issues which prevented him from getting his med refill and paracentesis for 2 months. It was noted that during transport from [facility] to [facility] he was hypotensive Bp 80/46, was asymptomatic, was given 1L NS which did not improve his Bp. His utox was positive for THC and cocain and was found to have an LINNETTE (Cr 3.5), nephrology and GI consulted and given albumin (100cc) with improvement in Cr (to 1.22 on 9/6), UOP and Bp. CT A/P showed hepatic cirrhosis, small ascites, spenomegaly, anasarca suggesting portal hypertension/volume overload. His US doppler of the abdomen showed normal flow in IVC, portal, hepatic and splenic veins.  There were no concerns for SBP (no WBC count or Fevers), did not receive paracentesis (minimal ascites not amenable to drainage according to IR) and no abx were started. He developped asterixis on 9/6 was started on lactulose and discharged w/ plan to f/u with GI in a week to resume spironalactone which was stopped on D/c. His MELD score on discharge was 16. However, urine culture from 9/4 grew ESBL.     In the ED:  VS:   T 102.5, , Bp 131/85==>88/47==>89/40, spO2 97  Labs: WBC 10.47, Hb 9.2 (baseline), Plts 79, PT 19.5, INR 1.67, Na 126, Cl 90, Cr 1.72 bili 1.5,      50 y/o man with PMH of alcoholism (sober for 7 months), cirrhosis, smoking (35-40 pack year), DM, TENIZN (no longer on CPAP due to insurance issues), asthma, PUD, abdominal hernia (per patient), chronic back pain, Hx of gastric bypass, and Hx of multiple paracentesis to abdomen presenting for rigors and somnolence. He was initially hemodynamically stable but on presentation his blood pressure dropped to 88/47, he was given 2.8L but his blood pressure remained low at 89/50 and he was started on levophed   Of note he usually follows at [facility] where he gets his medication refills and paracentesis every few months. He was admitted on September 4th for generalized weakness, fatigue, decreased appetite, generalized abdominal discomfort, at the time he had some insurance issues which prevented him from getting his med refill and paracentesis for 2 months. It was noted that during transport from [facility] to [facility] he was hypotensive Bp 80/46, was asymptomatic, was given 1L NS which did not improve his Bp. His utox was positive for THC and cocain and was found to have an LINNETTE (Cr 3.5), nephrology and GI consulted and given albumin (100cc) with improvement in Cr (to 1.22 on 9/6), UOP and Bp. CT A/P showed hepatic cirrhosis, small ascites, spenomegaly, anasarca suggesting portal hypertension/volume overload. His US doppler of the abdomen showed normal flow in IVC, portal, hepatic and splenic veins.  There were no concerns for SBP (no WBC count or Fevers), did not receive paracentesis (minimal ascites not amenable to drainage according to IR) and no abx were started. He developped asterixis on 9/6 was started on lactulose and discharged w/ plan to f/u with GI in a week to resume spironalactone which was stopped on D/c. His MELD score on discharge was 16. However, urine culture from 9/4 grew ESBL.     In the ED:  VS:   T 102.5, , Bp 131/85==>88/47==>89/40, spO2 97  Labs: WBC 10.47, Hb 9.2 (baseline), Plts 79, PT 19.5, INR 1.67, Na 126, Cl 90, Cr 1.72 bili 1.5,      52 y/o man with PMH of alcoholism (sober for 7 months), cirrhosis, smoking (35-40 pack year), DM, TENZIN (no longer on CPAP due to insurance issues), asthma, PUD, abdominal hernia (per patient), chronic back pain, Hx of gastric bypass, and Hx of multiple paracentesis to abdomen presenting for rigors and somnolence. He was initially hemodynamically stable but on presentation his blood pressure dropped to 88/47, he was given 2.8L but his blood pressure remained low at 89/50 and he was started on levophed   Of note he usually follows at [facility] where he gets his medication refills and paracentesis every few months. He was admitted on September 4th for generalized weakness, fatigue, decreased appetite, generalized abdominal discomfort, at the time he had some insurance issues which prevented him from getting his med refill and paracentesis for 2 months. It was noted that during transport from [facility] to [facility] he was hypotensive Bp 80/46, was asymptomatic, was given 1L NS which did not improve his Bp. His utox was positive for THC and cocain and was found to have an LINNETTE (Cr 3.5), nephrology and GI consulted and given albumin (100cc) with improvement in Cr (to 1.22 on 9/6), UOP and Bp. CT A/P showed hepatic cirrhosis, small ascites, spenomegaly, anasarca suggesting portal hypertension/volume overload. His US doppler of the abdomen showed normal flow in IVC, portal, hepatic and splenic veins.  There were no concerns for SBP (no WBC count or Fevers), did not receive paracentesis (minimal ascites not amenable to drainage according to IR) and no abx were started. He developped asterixis on 9/6 was started on lactulose and discharged w/ plan to f/u with GI in a week to resume spironalactone which was stopped on D/c. His MELD score on discharge was 23 on dialysis. However, urine culture from 9/4 grew ESBL.     In the ED:  VS:   T 102.5, , Bp 131/85==>88/47==>89/40, spO2 97  Labs: WBC 10.47, Hb 9.2 (baseline), Plts 79, PT 19.5, INR 1.67, Na 126, Cl 90, Cr 1.72 bili 1.5,     UOP 2L   Bp soft but appropriate and on Levo    52 y/o man with PMH of alcoholism (sober for 7 months), cirrhosis, smoking (35-40 pack year), DM, TENZIN (no longer on CPAP due to insurance issues), asthma, PUD, abdominal hernia (per patient), chronic back pain, Hx of gastric bypass, and Hx of multiple paracentesis to abdomen presenting for rigors and somnolence. He was initially hemodynamically stable but on presentation his blood pressure dropped to 88/47, he was given 2.8L but his blood pressure remained low at 89/50 and he was started on levophed   Of note he usually follows at [facility] where he gets his medication refills and paracentesis every few months. He was admitted on September 4th for generalized weakness, fatigue, decreased appetite, generalized abdominal discomfort, at the time he had some insurance issues which prevented him from getting his med refill and paracentesis for 2 months. It was noted that during transport from [facility] to [facility] he was hypotensive Bp 80/46, was asymptomatic, was given 1L NS which did not improve his Bp. His utox was positive for THC and cocain and was found to have an LINNETTE (Cr 3.5), nephrology and GI consulted and given albumin (100cc) with improvement in Cr (to 1.22 on 9/6), UOP and Bp. CT A/P showed hepatic cirrhosis, small ascites, spenomegaly, anasarca suggesting portal hypertension/volume overload. His US doppler of the abdomen showed normal flow in IVC, portal, hepatic and splenic veins.  There were no concerns for SBP (no WBC count or Fevers), did not receive paracentesis (minimal ascites not amenable to drainage according to IR) and no abx were started. He developped asterixis on 9/6 was started on lactulose and discharged w/ plan to f/u with GI in a week to resume spironalactone which was stopped on D/c. His MELD score on discharge was 23 on dialysis. However, urine culture from 9/4 grew ESBL.     In the ED:  VS:   T 102.5, , Bp 131/85==>88/47==>89/40, spO2 97  Labs: WBC 10.47, Hb 9.2 (baseline), Plts 79, PT 19.5, INR 1.67, Na 126, Cl 90, Cr 1.72 bili 1.5,     UOP 2L   Bp soft but appropriate and on Levo    50 y/o man with PMH of alcoholism (sober for 7 months), cirrhosis, smoking (35-40 pack year), DM, TENZIN (no longer on CPAP due to insurance issues), asthma, PUD, abdominal hernia (per patient), chronic back pain, Hx of gastric bypass, and Hx of multiple paracentesis to abdomen presenting for rigors and somnolence. He was initially hemodynamically stable but on presentation his blood pressure dropped to 88/47, he was given 2.8L but his blood pressure remained low at 89/50 and he was started on levophed   Of note he usually follows at [facility] where he gets his medication refills and paracentesis every few months. He was admitted on September 4th for generalized weakness, fatigue, decreased appetite, generalized abdominal discomfort, at the time he had some insurance issues which prevented him from getting his med refill and paracentesis for 2 months. It was noted that during transport from [facility] to [facility] he was hypotensive Bp 80/46, was asymptomatic, was given 1L NS which did not improve his Bp. His utox was positive for THC and cocain and was found to have an LINNETTE (Cr 3.5), nephrology and GI consulted and given albumin (100cc) with improvement in Cr (to 1.22 on 9/6), UOP and Bp. CT A/P showed hepatic cirrhosis, small ascites, spenomegaly, anasarca suggesting portal hypertension/volume overload. His US doppler of the abdomen showed normal flow in IVC, portal, hepatic and splenic veins.  There were no concerns for SBP (no WBC count or Fevers), did not receive paracentesis (minimal ascites not amenable to drainage according to IR) and no abx were started. He developped asterixis on 9/6 was started on lactulose and discharged w/ plan to f/u with GI in a week to resume spironalactone which was stopped on D/c. His MELD score on discharge was 23 on dialysis. However, urine culture from 9/4 grew ESBL.     In the ED:  VS:   T 102.5, , Bp 131/85==>88/47==>89/40, spO2 97  Labs: WBC 10.47, Hb 9.2 (baseline), Plts 79, PT 19.5, INR 1.67, Na 126, Cl 90, Cr 1.72 bili 1.5,     UOP 2L   Bp soft but appropriate and on Levo    52 y/o man with PMH of alcoholism (sober for 7 months), cirrhosis, smoking (35-40 pack year), DM, TENZIN (no longer on CPAP due to insurance issues), asthma, PUD, abdominal hernia (per patient), chronic back pain, Hx of gastric bypass, and Hx of multiple paracentesis to abdomen presenting for rigors, abdominal pain,  and somnolence. He was initially hemodynamically stable but on presentation his blood pressure dropped to 88/47, he was given 2.8L but his blood pressure remained low at 89/50 and he was started on levophed   Of note he usually follows at [facility] where he gets his medication refills and paracentesis every few months. He was admitted on September 4th for generalized weakness, fatigue, decreased appetite, generalized abdominal discomfort, at the time he had some insurance issues which prevented him from getting his med refill and paracentesis for 2 months. It was noted that during transport from [facility] to [facility] he was hypotensive Bp 80/46, was asymptomatic, was given 1L NS which did not improve his Bp. His utox was positive for THC and cocain and was found to have an LINNETTE (Cr 3.5), nephrology and GI consulted and given albumin (100cc) with improvement in Cr (to 1.22 on 9/6), UOP and Bp. CT A/P showed hepatic cirrhosis, small ascites, spenomegaly, anasarca suggesting portal hypertension/volume overload. His US doppler of the abdomen showed normal flow in IVC, portal, hepatic and splenic veins.  There were no concerns for SBP (no WBC count or Fevers), did not receive paracentesis (minimal ascites not amenable to drainage according to IR) and no abx were started. He developped asterixis on 9/6 was started on lactulose and discharged w/ plan to f/u with GI in a week to resume spironalactone which was stopped on D/c. His MELD score on discharge was 23 on dialysis. However, urine culture from 9/4 grew ESBL.     In the ED:  VS:   T 102.5, , Bp 131/85==>88/47==>89/40, spO2 97  Labs: WBC 10.47, Hb 9.2 (baseline), Plts 79, PT 19.5, INR 1.67, Na 126, Cl 90, Cr 1.72 bili 1.5,     UOP 2L   Bp soft but appropriate and on Levo    52 y/o man with PMH of alcoholism (sober for 7 months), cirrhosis, smoking (35-40 pack year), DM, TENZIN (no longer on CPAP due to insurance issues), asthma, PUD, abdominal hernia (per patient), chronic back pain, Hx of gastric bypass, and Hx of multiple paracentesis to abdomen presenting for rigors, abdominal pain,  and somnolence. He was initially hemodynamically stable but on presentation his blood pressure dropped to 88/47, he was given 2.8L but his blood pressure remained low at 89/50 and he was started on levophed   Of note he usually follows at [facility] where he gets his medication refills and paracentesis every few months. He was admitted on September 4th for generalized weakness, fatigue, decreased appetite, generalized abdominal discomfort, at the time he had some insurance issues which prevented him from getting his med refill and paracentesis for 2 months. It was noted that during transport from [facility] to [facility] he was hypotensive Bp 80/46, was asymptomatic, was given 1L NS which did not improve his Bp. His utox was positive for THC and cocain and was found to have an LINNETTE (Cr 3.5), nephrology and GI consulted and given albumin (100cc) with improvement in Cr (to 1.22 on 9/6), UOP and Bp. CT A/P showed hepatic cirrhosis, small ascites, spenomegaly, anasarca suggesting portal hypertension/volume overload. His US doppler of the abdomen showed normal flow in IVC, portal, hepatic and splenic veins.  There were no concerns for SBP (no WBC count or Fevers), did not receive paracentesis (minimal ascites not amenable to drainage according to IR) and no abx were started. He developped asterixis on 9/6 was started on lactulose and discharged w/ plan to f/u with GI in a week to resume spironalactone which was stopped on D/c. His MELD score on discharge was 23 on dialysis. However, urine culture from 9/4 grew ESBL.     In the ED:  VS:   T 102.5, , Bp 131/85==>88/47==>89/40, spO2 97  Labs: WBC 10.47, Hb 9.2 (baseline), Plts 79, PT 19.5, INR 1.67, Na 126, Cl 90, Cr 1.72 bili 1.5,     UOP 2L   Bp soft but appropriate and on Levo    50 y/o man with PMH of alcoholism (sober for 7 months), cirrhosis, smoking (35-40 pack year), DM, TENZIN (no longer on CPAP due to insurance issues), asthma, PUD, abdominal hernia (per patient), chronic back pain, Hx of gastric bypass, and Hx of multiple paracentesis to abdomen presenting for rigors, abdominal pain,  and somnolence. He was initially hemodynamically stable but on presentation his blood pressure dropped to 88/47, he was given 2.8L but his blood pressure remained low at 89/50 and he was started on levophed   Of note he usually follows at [facility] where he gets his medication refills and paracentesis every few months. He was admitted on September 4th for generalized weakness, fatigue, decreased appetite, generalized abdominal discomfort, at the time he had some insurance issues which prevented him from getting his med refill and paracentesis for 2 months. It was noted that during transport from [facility] to [facility] he was hypotensive Bp 80/46, was asymptomatic, was given 1L NS which did not improve his Bp. His utox was positive for THC and cocain and was found to have an LINNETTE (Cr 3.5), nephrology and GI consulted and given albumin (100cc) with improvement in Cr (to 1.22 on 9/6), UOP and Bp. CT A/P showed hepatic cirrhosis, small ascites, spenomegaly, anasarca suggesting portal hypertension/volume overload. His US doppler of the abdomen showed normal flow in IVC, portal, hepatic and splenic veins.  There were no concerns for SBP (no WBC count or Fevers), did not receive paracentesis (minimal ascites not amenable to drainage according to IR) and no abx were started. He developped asterixis on 9/6 was started on lactulose and discharged w/ plan to f/u with GI in a week to resume spironalactone which was stopped on D/c. His MELD score on discharge was 23 on dialysis. However, urine culture from 9/4 grew ESBL.     In the ED:  VS:   T 102.5, , Bp 131/85==>88/47==>89/40, spO2 97  Labs: WBC 10.47, Hb 9.2 (baseline), Plts 79, PT 19.5, INR 1.67, Na 126, Cl 90, Cr 1.72 bili 1.5,     UOP 2L   Bp soft but appropriate and on Levo    52 y/o man with PMH of alcoholism (sober for 7 months), cirrhosis, smoking (35-40 pack year), DM, TENZIN (no longer on CPAP due to insurance issues), asthma, PUD, abdominal hernia (per patient), chronic back pain, Hx of gastric bypass, and Hx of multiple paracentesis to abdomen presenting from his shelter for rigors, abdominal pain, dizziness, nausea, SOB and somnolence. He said this started on 09/07 after being discharged from [facility]. He developed RUQ, LUQ and epigastric, sharp/stabbing, non radiating abdominal pain that he rates 9/10, along with dizziness and difficulty ambulating as he would feel like he is about to "pass out". The patient has lost consciousness a few times in the past while ambulating, but has not lost consciousness leading up to this admission and never sustained trauma to his head. He has been nauseous since discharge and has not eaten for the past few days. He also gets short of breath on excretion and palpitations, but has not had any CP, changes in vision, changes in hearing, headaches, no easy bruising or bleeding, no hematuria or hematochezia, no pain with urination or urgency, but frequency was present, no constipation or diarrhea. He does admit to having lower extremity edema that requires him to elevate his legs when sleeping, lower extremity neuropathic pain for which he takes gabapentin and back pain for which he takes oxycodone.   Of note he usually follows at [facility] where he gets his medication refills and paracentesis every few months. He was admitted on September 4th to [facility] for generalized weakness, fatigue, decreased appetite, generalized abdominal discomfort, at the time he had some insurance issues which prevented him from getting his med refill and paracentesis for 2 months. It was noted that during transport from [facility] to [facility] he was hypotensive Bp 80/46, was asymptomatic, was given 1L NS which did not improve his Bp. His utox was positive for THC and cocaine and was found to have an LINNETTE (Cr 3.5), nephrology and GI consulted and given albumin (100cc) with improvement in Cr (to 1.22 on 9/6), UOP and Bp. CT A/P showed hepatic cirrhosis, small ascites, spenomegaly, anasarca suggesting portal hypertension/volume overload. His US doppler of the abdomen showed normal flow in IVC, portal, hepatic and splenic veins.  There were no concerns for SBP (no WBC count or Fevers), did not receive paracentesis (minimal ascites not amenable to drainage according to IR) and no abx were started. He developed asterixis on 9/6 was started on lactulose and discharged w/ plan to f/u with GI in a week to resume spironolactone which was stopped on D/c. His MELD score on discharge was 23 on dialysis. However, urine culture from 9/4 grew ESBL.     On this visit, he arrived to [facility] from his shelter by ambulance with the aforementioned symptoms. He was initially hemodynamically stable but on presentation his blood pressure dropped to 88/47, he was given 2.8L but his blood pressure remained low at 89/50 and he was started on levophed    In the ED:  VS:   T 102.5, , Bp 131/85==>88/47==>89/40, spO2 97  Labs: WBC 10.47, Hb 9.2 (baseline), Plts 79, PT 19.5, INR 1.67, Na 126, Cl 90, Cr 1.72 bili 1.5,   UA negative nitrites and positive Leuk esterase, Covid negative, CXR wnl  He was given Acetaminophen and ibuprofen, Ceftriaxone, Zosyn, Bactrim  NS 2.8L and started Levophed   He had 2L UOP in [facility]    50 y/o man with PMH of alcoholism (sober for 7 months), cirrhosis, smoking (35-40 pack year), DM, TENZIN (no longer on CPAP due to insurance issues), asthma, PUD, abdominal hernia (per patient), chronic back pain, Hx of gastric bypass, and Hx of multiple paracentesis to abdomen presenting from his shelter for rigors, abdominal pain, dizziness, nausea, SOB and somnolence. He said this started on 09/07 after being discharged from [facility]. He developed RUQ, LUQ and epigastric, sharp/stabbing, non radiating abdominal pain that he rates 9/10, along with dizziness and difficulty ambulating as he would feel like he is about to "pass out". The patient has lost consciousness a few times in the past while ambulating, but has not lost consciousness leading up to this admission and never sustained trauma to his head. He has been nauseous since discharge and has not eaten for the past few days. He also gets short of breath on excretion and palpitations, but has not had any CP, changes in vision, changes in hearing, headaches, no easy bruising or bleeding, no hematuria or hematochezia, no pain with urination or urgency, but frequency was present, no constipation or diarrhea. He does admit to having lower extremity edema that requires him to elevate his legs when sleeping, lower extremity neuropathic pain for which he takes gabapentin and back pain for which he takes oxycodone.   Of note he usually follows at [facility] where he gets his medication refills and paracentesis every few months. He was admitted on September 4th to [facility] for generalized weakness, fatigue, decreased appetite, generalized abdominal discomfort, at the time he had some insurance issues which prevented him from getting his med refill and paracentesis for 2 months. It was noted that during transport from [facility] to [facility] he was hypotensive Bp 80/46, was asymptomatic, was given 1L NS which did not improve his Bp. His utox was positive for THC and cocaine and was found to have an LINNETTE (Cr 3.5), nephrology and GI consulted and given albumin (100cc) with improvement in Cr (to 1.22 on 9/6), UOP and Bp. CT A/P showed hepatic cirrhosis, small ascites, spenomegaly, anasarca suggesting portal hypertension/volume overload. His US doppler of the abdomen showed normal flow in IVC, portal, hepatic and splenic veins.  There were no concerns for SBP (no WBC count or Fevers), did not receive paracentesis (minimal ascites not amenable to drainage according to IR) and no abx were started. He developed asterixis on 9/6 was started on lactulose and discharged w/ plan to f/u with GI in a week to resume spironolactone which was stopped on D/c. His MELD score on discharge was 23 on dialysis. However, urine culture from 9/4 grew ESBL.     On this visit, he arrived to [facility] from his shelter by ambulance with the aforementioned symptoms. He was initially hemodynamically stable but on presentation his blood pressure dropped to 88/47, he was given 2.8L but his blood pressure remained low at 89/50 and he was started on levophed    In the ED:  VS:   T 102.5, , Bp 131/85==>88/47==>89/40, spO2 97  Labs: WBC 10.47, Hb 9.2 (baseline), Plts 79, PT 19.5, INR 1.67, Na 126, Cl 90, Cr 1.72 bili 1.5,   UA negative nitrites and positive Leuk esterase, Covid negative, CXR wnl  He was given Acetaminophen and ibuprofen, Ceftriaxone, Zosyn, Bactrim  NS 2.8L and started Levophed   He had 2L UOP in [facility]    52 y/o man with PMH of alcoholism (sober for 7 months), cirrhosis, smoking (35-40 pack year), DM, TENZIN (no longer on CPAP due to insurance issues), asthma, PUD, abdominal hernia (per patient), chronic back pain, Hx of gastric bypass, and Hx of multiple paracentesis to abdomen presenting from his shelter for rigors, abdominal pain, dizziness, nausea, SOB and somnolence. He said this started on 09/07 after being discharged from [facility]. He developed RUQ, LUQ and epigastric, sharp/stabbing, non radiating abdominal pain that he rates 9/10, along with dizziness and difficulty ambulating as he would feel like he is about to "pass out". The patient has lost consciousness a few times in the past while ambulating, but has not lost consciousness leading up to this admission and never sustained trauma to his head. He has been nauseous since discharge and has not eaten for the past few days. He also gets short of breath on excretion and palpitations, but has not had any CP, changes in vision, changes in hearing, headaches, no easy bruising or bleeding, no hematuria or hematochezia, no pain with urination or urgency, but frequency was present, no constipation or diarrhea. He does admit to having lower extremity edema that requires him to elevate his legs when sleeping, lower extremity neuropathic pain for which he takes gabapentin and back pain for which he takes oxycodone.   Of note he usually follows at [facility] where he gets his medication refills and paracentesis every few months. He was admitted on September 4th to [facility] for generalized weakness, fatigue, decreased appetite, generalized abdominal discomfort, at the time he had some insurance issues which prevented him from getting his med refill and paracentesis for 2 months. It was noted that during transport from [facility] to [facility] he was hypotensive Bp 80/46, was asymptomatic, was given 1L NS which did not improve his Bp. His utox was positive for THC and cocaine and was found to have an LINNETTE (Cr 3.5), nephrology and GI consulted and given albumin (100cc) with improvement in Cr (to 1.22 on 9/6), UOP and Bp. CT A/P showed hepatic cirrhosis, small ascites, spenomegaly, anasarca suggesting portal hypertension/volume overload. His US doppler of the abdomen showed normal flow in IVC, portal, hepatic and splenic veins.  There were no concerns for SBP (no WBC count or Fevers), did not receive paracentesis (minimal ascites not amenable to drainage according to IR) and no abx were started. He developed asterixis on 9/6 was started on lactulose and discharged w/ plan to f/u with GI in a week to resume spironolactone which was stopped on D/c. His MELD score on discharge was 23 on dialysis. However, urine culture from 9/4 grew ESBL.     On this visit, he arrived to [facility] from his shelter by ambulance with the aforementioned symptoms. He was initially hemodynamically stable but on presentation his blood pressure dropped to 88/47, he was given 2.8L but his blood pressure remained low at 89/50 and he was started on levophed    In the ED:  VS:   T 102.5, , Bp 131/85==>88/47==>89/40, spO2 97  Labs: WBC 10.47, Hb 9.2 (baseline), Plts 79, PT 19.5, INR 1.67, Na 126, Cl 90, Cr 1.72 bili 1.5,   UA negative nitrites and positive Leuk esterase, Covid negative, CXR wnl  He was given Acetaminophen and ibuprofen, Ceftriaxone, Zosyn, Bactrim  NS 2.8L and started Levophed   He had 2L UOP in [facility]    50 y/o man with PMH of alcoholism (sober for 7 months), cirrhosis, smoking (35-40 pack year), DM, TENZIN (no longer on CPAP due to insurance issues), asthma, PUD, abdominal hernia (per patient), chronic back pain, Hx of gastric bypass, and Hx of multiple paracentesis to abdomen presenting from his shelter for rigors, abdominal pain, dizziness, nausea, SOB and somnolence. He said this started on 09/07 after being discharged from [facility]. He developed RUQ, LUQ and epigastric, sharp/stabbing, non radiating abdominal pain that he rates 9/10, along with dizziness and difficulty ambulating as he would feel like he is about to "pass out". The patient has lost consciousness a few times in the past while ambulating, but has not lost consciousness leading up to this admission and never sustained trauma to his head. He has been nauseous since discharge and has not eaten for the past few days. He also gets short of breath on excretion and palpitations, but has not had any CP, changes in vision, changes in hearing, headaches, no easy bruising or bleeding, no hematuria or hematochezia, no pain with urination or urgency, but frequency was present, no constipation or diarrhea. He does admit to having lower extremity edema that requires him to elevate his legs when sleeping, lower extremity neuropathic pain for which he takes gabapentin and back pain for which he takes oxycodone.   Of note he usually follows at [facility] where he gets his medication refills and paracentesis every few months. He was admitted on September 4th to [facility] for generalized weakness, fatigue, decreased appetite, generalized abdominal discomfort, at the time he had some insurance issues which prevented him from getting his med refill and paracentesis for 2 months. It was noted that during transport from [facility] to [facility] he was hypotensive Bp 80/46, was asymptomatic, was given 1L NS which did not improve his Bp. His utox was positive for THC and cocaine and was found to have an LINNETTE (Cr 3.5), nephrology and GI consulted and given albumin (100cc) with improvement in Cr (to 1.22 on 9/6), UOP and Bp. CT A/P showed hepatic cirrhosis, small ascites, spenomegaly, anasarca suggesting portal hypertension/volume overload. His US doppler of the abdomen showed normal flow in IVC, portal, hepatic and splenic veins.  There were no concerns for SBP (no WBC count or Fevers), did not receive paracentesis (minimal ascites not amenable to drainage according to IR) and no abx were started. He developed asterixis on 9/6 was started on lactulose and discharged w/ plan to f/u with GI in a week to resume spironolactone which was stopped on D/c. His MELD score on discharge was 23 on dialysis. However, urine culture from 9/4 grew ESBL.     On this visit, he arrived to [facility] from his shelter by ambulance with the aforementioned symptoms. He was initially hemodynamically stable but on presentation his blood pressure dropped to 88/47, he was given 2.8L but his blood pressure remained low at 89/50 and he was started on levophed    In the ED:  VS:   T 102.5, , Bp 131/85==>88/47==>89/40, spO2 97  Labs: WBC 10.47, Hb 9.2 (baseline), Plts 79, PT 19.5, INR 1.67, Na 126, Cl 90, Cr 1.72 bili 1.5,   UA negative nitrites and positive Leuk esterase, Covid negative, CXR wnl  He was given Acetaminophen and ibuprofen, Ceftriaxone, Zosyn, Bactrim  NS 2.8L and started Levophed   He had 2L UOP in [facility]   Blood and urine cultures were collected    50 y/o man with PMH of alcoholism (sober for 7 months), cirrhosis, smoking (35-40 pack year), DM, TENZIN (no longer on CPAP due to insurance issues), asthma, PUD, abdominal hernia (per patient), chronic back pain, Hx of gastric bypass, and Hx of multiple paracentesis to abdomen presenting from his shelter for rigors, abdominal pain, dizziness, nausea, SOB and somnolence. He said this started on 09/07 after being discharged from [facility]. He developed RUQ, LUQ and epigastric, sharp/stabbing, non radiating abdominal pain that he rates 9/10, along with dizziness and difficulty ambulating as he would feel like he is about to "pass out". The patient has lost consciousness a few times in the past while ambulating, but has not lost consciousness leading up to this admission and never sustained trauma to his head. He has been nauseous since discharge and has not eaten for the past few days. He also gets short of breath on excretion and palpitations, but has not had any CP, changes in vision, changes in hearing, headaches, no easy bruising or bleeding, no hematuria or hematochezia, no pain with urination or urgency, but frequency was present, no constipation or diarrhea. He does admit to having lower extremity edema that requires him to elevate his legs when sleeping, lower extremity neuropathic pain for which he takes gabapentin and back pain for which he takes oxycodone.   Of note he usually follows at [facility] where he gets his medication refills and paracentesis every few months. He was admitted on September 4th to [facility] for generalized weakness, fatigue, decreased appetite, generalized abdominal discomfort, at the time he had some insurance issues which prevented him from getting his med refill and paracentesis for 2 months. It was noted that during transport from [facility] to [facility] he was hypotensive Bp 80/46, was asymptomatic, was given 1L NS which did not improve his Bp. His utox was positive for THC and cocaine and was found to have an LINNETTE (Cr 3.5), nephrology and GI consulted and given albumin (100cc) with improvement in Cr (to 1.22 on 9/6), UOP and Bp. CT A/P showed hepatic cirrhosis, small ascites, spenomegaly, anasarca suggesting portal hypertension/volume overload. His US doppler of the abdomen showed normal flow in IVC, portal, hepatic and splenic veins.  There were no concerns for SBP (no WBC count or Fevers), did not receive paracentesis (minimal ascites not amenable to drainage according to IR) and no abx were started. He developed asterixis on 9/6 was started on lactulose and discharged w/ plan to f/u with GI in a week to resume spironolactone which was stopped on D/c. His MELD score on discharge was 23 on dialysis. However, urine culture from 9/4 grew ESBL.     On this visit, he arrived to [facility] from his shelter by ambulance with the aforementioned symptoms. He was initially hemodynamically stable but on presentation his blood pressure dropped to 88/47, he was given 2.8L but his blood pressure remained low at 89/50 and he was started on levophed    In the ED:  VS:   T 102.5, , Bp 131/85==>88/47==>89/40, spO2 97  Labs: WBC 10.47, Hb 9.2 (baseline), Plts 79, PT 19.5, INR 1.67, Na 126, Cl 90, Cr 1.72 bili 1.5,   UA negative nitrites and positive Leuk esterase, Covid negative, CXR wnl  He was given Acetaminophen and ibuprofen, Ceftriaxone, Zosyn, Bactrim  NS 2.8L and started Levophed   He had 2L UOP in [facility]   Blood and urine cultures were collected    50 y/o man with PMH of alcoholism (sober for 7 months), cirrhosis, smoking (35-40 pack year), DM, TENZIN (no longer on CPAP due to insurance issues), asthma, PUD, abdominal hernia (per patient), chronic back pain, Hx of gastric bypass, and Hx of multiple paracentesis to abdomen presenting from his shelter for rigors, abdominal pain, dizziness, nausea, SOB and somnolence. He said this started on 09/07 after being discharged from [facility]. He developed RUQ, LUQ and epigastric, sharp/stabbing, non radiating abdominal pain that he rates 9/10, along with dizziness and difficulty ambulating as he would feel like he is about to "pass out". The patient has lost consciousness a few times in the past while ambulating, but has not lost consciousness leading up to this admission and never sustained trauma to his head. He has been nauseous since discharge and has not eaten for the past few days. He also gets short of breath on excretion and palpitations, but has not had any CP, changes in vision, changes in hearing, headaches, no easy bruising or bleeding, no hematuria or hematochezia, no pain with urination or urgency, but frequency was present, no constipation or diarrhea. He does admit to having lower extremity edema that requires him to elevate his legs when sleeping, lower extremity neuropathic pain for which he takes gabapentin and back pain for which he takes oxycodone.   Of note he usually follows at [facility] where he gets his medication refills and paracentesis every few months. He was admitted on September 4th to [facility] for generalized weakness, fatigue, decreased appetite, generalized abdominal discomfort, at the time he had some insurance issues which prevented him from getting his med refill and paracentesis for 2 months. It was noted that during transport from [facility] to [facility] he was hypotensive Bp 80/46, was asymptomatic, was given 1L NS which did not improve his Bp. His utox was positive for THC and cocaine and was found to have an LINNETTE (Cr 3.5), nephrology and GI consulted and given albumin (100cc) with improvement in Cr (to 1.22 on 9/6), UOP and Bp. CT A/P showed hepatic cirrhosis, small ascites, spenomegaly, anasarca suggesting portal hypertension/volume overload. His US doppler of the abdomen showed normal flow in IVC, portal, hepatic and splenic veins.  There were no concerns for SBP (no WBC count or Fevers), did not receive paracentesis (minimal ascites not amenable to drainage according to IR) and no abx were started. He developed asterixis on 9/6 was started on lactulose and discharged w/ plan to f/u with GI in a week to resume spironolactone which was stopped on D/c. His MELD score on discharge was 23 on dialysis. However, urine culture from 9/4 grew ESBL.     On this visit, he arrived to [facility] from his shelter by ambulance with the aforementioned symptoms. He was initially hemodynamically stable but on presentation his blood pressure dropped to 88/47, he was given 2.8L but his blood pressure remained low at 89/50 and he was started on levophed    In the ED:  VS:   T 102.5, , Bp 131/85==>88/47==>89/40, spO2 97  Labs: WBC 10.47, Hb 9.2 (baseline), Plts 79, PT 19.5, INR 1.67, Na 126, Cl 90, Cr 1.72 bili 1.5,   UA negative nitrites and positive Leuk esterase, Covid negative, CXR wnl  He was given Acetaminophen and ibuprofen, Ceftriaxone, Zosyn, Bactrim  NS 2.8L and started Levophed   He had 2L UOP in [facility]   Blood and urine cultures were collected    50 y/o man with PMH of cirrhosis, DM, TENZIN (no longer on CPAP due to insurance issues), asthma, PUD, abdominal hernia (per patient), chronic back pain, Hx of gastric bypass, and Hx of multiple paracentesis to abdomen presenting from his shelter for rigors, abdominal pain, dizziness, nausea, SOB and somnolence. He said this started on 09/07 after being discharged from [facility]. He developed RUQ, LUQ and epigastric, sharp/stabbing, non radiating abdominal pain that he rates 9/10, along with dizziness and difficulty ambulating as he would feel like he is about to "pass out". The patient has lost consciousness a few times in the past while ambulating, but has not lost consciousness leading up to this admission and never sustained trauma to his head. He has been nauseous since discharge and has not eaten for the past few days. He also gets short of breath on excretion and palpitations, but has not had any CP, changes in vision, changes in hearing, headaches, no easy bruising or bleeding, no hematuria or hematochezia, no pain with urination or urgency, but frequency was present, no constipation or diarrhea. He does admit to having lower extremity edema that requires him to elevate his legs when sleeping, lower extremity neuropathic pain for which he takes gabapentin and back pain for which he takes oxycodone.   Of note he usually follows at [facility] where he gets his medication refills and paracentesis every few months. He was admitted on September 4th to [facility] for generalized weakness, fatigue, decreased appetite, generalized abdominal discomfort, at the time he had some insurance issues which prevented him from getting his med refill and paracentesis for 2 months. It was noted that during transport from [facility] to [facility] he was hypotensive Bp 80/46, was asymptomatic, was given 1L NS which did not improve his Bp. His utox was positive for THC and cocaine and was found to have an LINNETTE (Cr 3.5), nephrology and GI consulted and given albumin (100cc) with improvement in Cr (to 1.22 on 9/6), UOP and Bp. CT A/P showed hepatic cirrhosis, small ascites, spenomegaly, anasarca suggesting portal hypertension/volume overload. His US doppler of the abdomen showed normal flow in IVC, portal, hepatic and splenic veins.  There were no concerns for SBP (no WBC count or Fevers), did not receive paracentesis (minimal ascites not amenable to drainage according to IR) and no abx were started. He developed asterixis on 9/6 was started on lactulose and discharged w/ plan to f/u with GI in a week to resume spironolactone which was stopped on D/c. His MELD score on discharge was 23 on dialysis. However, urine culture from 9/4 grew ESBL.     On this visit, he arrived to [facility] from his shelter by ambulance with the aforementioned symptoms. He was initially hemodynamically stable but on presentation his blood pressure dropped to 88/47, he was given 2.8L but his blood pressure remained low at 89/50 and he was started on levophed    In the ED:  VS:   T 102.5, , Bp 131/85==>88/47==>89/40, spO2 97  Labs: WBC 10.47, Hb 9.2 (baseline), Plts 79, PT 19.5, INR 1.67, Na 126, Cl 90, Cr 1.72 bili 1.5,   UA negative nitrites and positive Leuk esterase, Covid negative, CXR wnl  He was given Acetaminophen and ibuprofen, Ceftriaxone, Zosyn, Bactrim  NS 2.8L and started Levophed   He had 2L UOP in [facility]   Blood and urine cultures were collected    50 y/o man with PMH of cirrhosis, DM, TENZIN (no longer on CPAP due to insurance issues), asthma, PUD, abdominal hernia (per patient), chronic back pain, Hx of gastric bypass, and Hx of multiple paracentesis to abdomen presenting from his shelter for rigors, abdominal pain, dizziness, nausea, SOB and somnolence. He said this started on 09/07 after being discharged from [facility]. He developed RUQ, LUQ and epigastric, sharp/stabbing, non radiating abdominal pain that he rates 9/10, along with dizziness and difficulty ambulating as he would feel like he is about to "pass out". The patient has lost consciousness a few times in the past while ambulating, but has not lost consciousness leading up to this admission and never sustained trauma to his head. He has been nauseous since discharge and has not eaten for the past few days. He also gets short of breath on excretion and palpitations, but has not had any CP, changes in vision, changes in hearing, headaches, no easy bruising or bleeding, no hematuria or hematochezia, no pain with urination or urgency, but frequency was present, no constipation or diarrhea. He does admit to having lower extremity edema that requires him to elevate his legs when sleeping, lower extremity neuropathic pain for which he takes gabapentin and back pain for which he takes oxycodone.   Of note he usually follows at [facility] where he gets his medication refills and paracentesis every few months. He was admitted on September 4th to [facility] for generalized weakness, fatigue, decreased appetite, generalized abdominal discomfort, at the time he had some insurance issues which prevented him from getting his med refill and paracentesis for 2 months. It was noted that during transport from [facility] to [facility] he was hypotensive Bp 80/46, was asymptomatic, was given 1L NS which did not improve his Bp. His utox was positive for THC and cocaine and was found to have an LINNETTE (Cr 3.5), nephrology and GI consulted and given albumin (100cc) with improvement in Cr (to 1.22 on 9/6), UOP and Bp. CT A/P showed hepatic cirrhosis, small ascites, spenomegaly, anasarca suggesting portal hypertension/volume overload. His US doppler of the abdomen showed normal flow in IVC, portal, hepatic and splenic veins.  There were no concerns for SBP (no WBC count or Fevers), did not receive paracentesis (minimal ascites not amenable to drainage according to IR) and no abx were started. He developed asterixis on 9/6 was started on lactulose and discharged w/ plan to f/u with GI in a week to resume spironolactone which was stopped on D/c. His MELD score on discharge was 23 on dialysis. However, urine culture from 9/4 grew ESBL.     On this visit, he arrived to [facility] from his shelter by ambulance with the aforementioned symptoms. He was initially hemodynamically stable but on presentation his blood pressure dropped to 88/47, he was given 2.8L but his blood pressure remained low at 89/50 and he was started on levophed    In the ED:  VS:   T 102.5, , Bp 131/85==>88/47==>89/40, spO2 97  Labs: WBC 10.47, Hb 9.2 (baseline), Plts 79, PT 19.5, INR 1.67, Na 126, Cl 90, Cr 1.72 bili 1.5,   UA negative nitrites and positive Leuk esterase, Covid negative, CXR wnl  He was given Acetaminophen and ibuprofen, Ceftriaxone, Zosyn, Bactrim  NS 2.8L and started Levophed   He had 2L UOP in [facility]   Blood and urine cultures were collected    50 y/o man with PMH of cirrhosis, DM, TENZIN (no longer on CPAP due to insurance issues), asthma, PUD, abdominal hernia (per patient), chronic back pain, Hx of gastric bypass, and Hx of multiple paracentesis to abdomen presenting from his shelter for rigors, abdominal pain, dizziness, nausea, SOB and somnolence. He said this started on 09/07 after being discharged from [facility]. He developed RUQ, LUQ and epigastric, sharp/stabbing, non radiating abdominal pain that he rates 9/10, along with dizziness and difficulty ambulating as he would feel like he is about to "pass out". The patient has lost consciousness a few times in the past while ambulating, but has not lost consciousness leading up to this admission and never sustained trauma to his head. He has been nauseous since discharge and has not eaten for the past few days. He also gets short of breath on excretion and palpitations, but has not had any CP, changes in vision, changes in hearing, headaches, no easy bruising or bleeding, no hematuria or hematochezia, no pain with urination or urgency, but frequency was present, no constipation or diarrhea. He does admit to having lower extremity edema that requires him to elevate his legs when sleeping, lower extremity neuropathic pain for which he takes gabapentin and back pain for which he takes oxycodone.   Of note he usually follows at [facility] where he gets his medication refills and paracentesis every few months. He was admitted on September 4th to [facility] for generalized weakness, fatigue, decreased appetite, generalized abdominal discomfort, at the time he had some insurance issues which prevented him from getting his med refill and paracentesis for 2 months. It was noted that during transport from [facility] to [facility] he was hypotensive Bp 80/46, was asymptomatic, was given 1L NS which did not improve his Bp. His utox was positive for THC and cocaine and was found to have an LINNETTE (Cr 3.5), nephrology and GI consulted and given albumin (100cc) with improvement in Cr (to 1.22 on 9/6), UOP and Bp. CT A/P showed hepatic cirrhosis, small ascites, spenomegaly, anasarca suggesting portal hypertension/volume overload. His US doppler of the abdomen showed normal flow in IVC, portal, hepatic and splenic veins.  There were no concerns for SBP (no WBC count or Fevers), did not receive paracentesis (minimal ascites not amenable to drainage according to IR) and no abx were started. He developed asterixis on 9/6 was started on lactulose and discharged w/ plan to f/u with GI in a week to resume spironolactone which was stopped on D/c. His MELD score on discharge was 23 on dialysis. However, urine culture from 9/4 grew ESBL.     On this visit, he arrived to [facility] from his shelter by ambulance with the aforementioned symptoms. He was initially hemodynamically stable but on presentation his blood pressure dropped to 88/47, he was given 2.8L but his blood pressure remained low at 89/50 and he was started on levophed    In the ED:  VS:   T 102.5, , Bp 131/85==>88/47==>89/40, spO2 97  Labs: WBC 10.47, Hb 9.2 (baseline), Plts 79, PT 19.5, INR 1.67, Na 126, Cl 90, Cr 1.72 bili 1.5,   UA negative nitrites and positive Leuk esterase, Covid negative, CXR wnl  He was given Acetaminophen and ibuprofen, Ceftriaxone, Zosyn, Bactrim  NS 2.8L and started Levophed   He had 2L UOP in [facility]   Blood and urine cultures were collected

## 2022-09-10 DIAGNOSIS — K74.60 UNSPECIFIED CIRRHOSIS OF LIVER: ICD-10-CM

## 2022-09-10 DIAGNOSIS — N39.0 URINARY TRACT INFECTION, SITE NOT SPECIFIED: ICD-10-CM

## 2022-09-10 DIAGNOSIS — A41.9 SEPSIS, UNSPECIFIED ORGANISM: ICD-10-CM

## 2022-09-10 DIAGNOSIS — G47.33 OBSTRUCTIVE SLEEP APNEA (ADULT) (PEDIATRIC): ICD-10-CM

## 2022-09-10 DIAGNOSIS — D64.9 ANEMIA, UNSPECIFIED: ICD-10-CM

## 2022-09-10 DIAGNOSIS — G93.41 METABOLIC ENCEPHALOPATHY: ICD-10-CM

## 2022-09-10 DIAGNOSIS — Z29.9 ENCOUNTER FOR PROPHYLACTIC MEASURES, UNSPECIFIED: ICD-10-CM

## 2022-09-10 DIAGNOSIS — K27.9 PEPTIC ULCER, SITE UNSPECIFIED, UNSPECIFIED AS ACUTE OR CHRONIC, WITHOUT HEMORRHAGE OR PERFORATION: ICD-10-CM

## 2022-09-10 DIAGNOSIS — E11.9 TYPE 2 DIABETES MELLITUS WITHOUT COMPLICATIONS: ICD-10-CM

## 2022-09-10 LAB
ALBUMIN SERPL ELPH-MCNC: 3.4 G/DL — SIGNIFICANT CHANGE UP (ref 3.3–5)
ALP SERPL-CCNC: 93 U/L — SIGNIFICANT CHANGE UP (ref 40–120)
ALT FLD-CCNC: 11 U/L — SIGNIFICANT CHANGE UP (ref 10–45)
ANION GAP SERPL CALC-SCNC: 12 MMOL/L — SIGNIFICANT CHANGE UP (ref 5–17)
AST SERPL-CCNC: 21 U/L — SIGNIFICANT CHANGE UP (ref 10–40)
BASOPHILS # BLD AUTO: 0.01 K/UL — SIGNIFICANT CHANGE UP (ref 0–0.2)
BASOPHILS NFR BLD AUTO: 0.1 % — SIGNIFICANT CHANGE UP (ref 0–2)
BILIRUB SERPL-MCNC: 1 MG/DL — SIGNIFICANT CHANGE UP (ref 0.2–1.2)
BUN SERPL-MCNC: 34 MG/DL — HIGH (ref 7–23)
CALCIUM SERPL-MCNC: 8.7 MG/DL — SIGNIFICANT CHANGE UP (ref 8.4–10.5)
CHLORIDE SERPL-SCNC: 96 MMOL/L — SIGNIFICANT CHANGE UP (ref 96–108)
CO2 SERPL-SCNC: 22 MMOL/L — SIGNIFICANT CHANGE UP (ref 22–31)
CREAT SERPL-MCNC: 1.42 MG/DL — HIGH (ref 0.5–1.3)
E COLI DNA BLD POS QL NAA+NON-PROBE: SIGNIFICANT CHANGE UP
EGFR: 59 ML/MIN/1.73M2 — LOW
EOSINOPHIL # BLD AUTO: 0.01 K/UL — SIGNIFICANT CHANGE UP (ref 0–0.5)
EOSINOPHIL NFR BLD AUTO: 0.1 % — SIGNIFICANT CHANGE UP (ref 0–6)
GLUCOSE BLDC GLUCOMTR-MCNC: 154 MG/DL — HIGH (ref 70–99)
GLUCOSE BLDC GLUCOMTR-MCNC: 164 MG/DL — HIGH (ref 70–99)
GLUCOSE BLDC GLUCOMTR-MCNC: 165 MG/DL — HIGH (ref 70–99)
GLUCOSE BLDC GLUCOMTR-MCNC: 181 MG/DL — HIGH (ref 70–99)
GLUCOSE SERPL-MCNC: 153 MG/DL — HIGH (ref 70–99)
GRAM STN FLD: SIGNIFICANT CHANGE UP
HCT VFR BLD CALC: 25.2 % — LOW (ref 39–50)
HGB BLD-MCNC: 8.4 G/DL — LOW (ref 13–17)
IMM GRANULOCYTES NFR BLD AUTO: 0.5 % — SIGNIFICANT CHANGE UP (ref 0–1.5)
LACTATE SERPL-SCNC: 1.3 MMOL/L — SIGNIFICANT CHANGE UP (ref 0.5–2)
LYMPHOCYTES # BLD AUTO: 0.37 K/UL — LOW (ref 1–3.3)
LYMPHOCYTES # BLD AUTO: 4.5 % — LOW (ref 13–44)
MAGNESIUM SERPL-MCNC: 1.8 MG/DL — SIGNIFICANT CHANGE UP (ref 1.6–2.6)
MCHC RBC-ENTMCNC: 28 PG — SIGNIFICANT CHANGE UP (ref 27–34)
MCHC RBC-ENTMCNC: 33.3 GM/DL — SIGNIFICANT CHANGE UP (ref 32–36)
MCV RBC AUTO: 84 FL — SIGNIFICANT CHANGE UP (ref 80–100)
METHOD TYPE: SIGNIFICANT CHANGE UP
MONOCYTES # BLD AUTO: 0.51 K/UL — SIGNIFICANT CHANGE UP (ref 0–0.9)
MONOCYTES NFR BLD AUTO: 6.3 % — SIGNIFICANT CHANGE UP (ref 2–14)
NEUTROPHILS # BLD AUTO: 7.22 K/UL — SIGNIFICANT CHANGE UP (ref 1.8–7.4)
NEUTROPHILS NFR BLD AUTO: 88.5 % — HIGH (ref 43–77)
NRBC # BLD: 0 /100 WBCS — SIGNIFICANT CHANGE UP (ref 0–0)
PHOSPHATE SERPL-MCNC: 3.3 MG/DL — SIGNIFICANT CHANGE UP (ref 2.5–4.5)
PLATELET # BLD AUTO: 74 K/UL — LOW (ref 150–400)
POTASSIUM SERPL-MCNC: 3.6 MMOL/L — SIGNIFICANT CHANGE UP (ref 3.5–5.3)
POTASSIUM SERPL-SCNC: 3.6 MMOL/L — SIGNIFICANT CHANGE UP (ref 3.5–5.3)
PROT SERPL-MCNC: 6.9 G/DL — SIGNIFICANT CHANGE UP (ref 6–8.3)
RBC # BLD: 3 M/UL — LOW (ref 4.2–5.8)
RBC # FLD: 15.1 % — HIGH (ref 10.3–14.5)
SODIUM SERPL-SCNC: 130 MMOL/L — LOW (ref 135–145)
SPECIMEN SOURCE: SIGNIFICANT CHANGE UP
WBC # BLD: 8.16 K/UL — SIGNIFICANT CHANGE UP (ref 3.8–10.5)
WBC # FLD AUTO: 8.16 K/UL — SIGNIFICANT CHANGE UP (ref 3.8–10.5)

## 2022-09-10 PROCEDURE — 76604 US EXAM CHEST: CPT | Mod: 26,GC

## 2022-09-10 PROCEDURE — 76705 ECHO EXAM OF ABDOMEN: CPT | Mod: 26,GC

## 2022-09-10 PROCEDURE — 99223 1ST HOSP IP/OBS HIGH 75: CPT | Mod: GC

## 2022-09-10 PROCEDURE — 93308 TTE F-UP OR LMTD: CPT | Mod: 26,GC

## 2022-09-10 PROCEDURE — 99233 SBSQ HOSP IP/OBS HIGH 50: CPT | Mod: GC

## 2022-09-10 RX ORDER — DEXTROSE 50 % IN WATER 50 %
15 SYRINGE (ML) INTRAVENOUS ONCE
Refills: 0 | Status: DISCONTINUED | OUTPATIENT
Start: 2022-09-10 | End: 2022-09-20

## 2022-09-10 RX ORDER — HEPARIN SODIUM 5000 [USP'U]/ML
5000 INJECTION INTRAVENOUS; SUBCUTANEOUS EVERY 8 HOURS
Refills: 0 | Status: DISCONTINUED | OUTPATIENT
Start: 2022-09-10 | End: 2022-09-10

## 2022-09-10 RX ORDER — POTASSIUM CHLORIDE 20 MEQ
40 PACKET (EA) ORAL ONCE
Refills: 0 | Status: COMPLETED | OUTPATIENT
Start: 2022-09-10 | End: 2022-09-10

## 2022-09-10 RX ORDER — ONDANSETRON 8 MG/1
4 TABLET, FILM COATED ORAL ONCE
Refills: 0 | Status: COMPLETED | OUTPATIENT
Start: 2022-09-10 | End: 2022-09-10

## 2022-09-10 RX ORDER — MULTIVIT-MIN/FERROUS GLUCONATE 9 MG/15 ML
1 LIQUID (ML) ORAL DAILY
Refills: 0 | Status: DISCONTINUED | OUTPATIENT
Start: 2022-09-10 | End: 2022-09-20

## 2022-09-10 RX ORDER — ACETAMINOPHEN 500 MG
650 TABLET ORAL ONCE
Refills: 0 | Status: COMPLETED | OUTPATIENT
Start: 2022-09-10 | End: 2022-09-10

## 2022-09-10 RX ORDER — ERTAPENEM SODIUM 1 G/1
1000 INJECTION, POWDER, LYOPHILIZED, FOR SOLUTION INTRAMUSCULAR; INTRAVENOUS EVERY 24 HOURS
Refills: 0 | Status: DISCONTINUED | OUTPATIENT
Start: 2022-09-10 | End: 2022-09-12

## 2022-09-10 RX ORDER — SODIUM CHLORIDE 9 MG/ML
1000 INJECTION, SOLUTION INTRAVENOUS
Refills: 0 | Status: DISCONTINUED | OUTPATIENT
Start: 2022-09-10 | End: 2022-09-20

## 2022-09-10 RX ORDER — INSULIN LISPRO 100/ML
VIAL (ML) SUBCUTANEOUS
Refills: 0 | Status: DISCONTINUED | OUTPATIENT
Start: 2022-09-10 | End: 2022-09-20

## 2022-09-10 RX ORDER — MIDODRINE HYDROCHLORIDE 2.5 MG/1
10 TABLET ORAL ONCE
Refills: 0 | Status: COMPLETED | OUTPATIENT
Start: 2022-09-10 | End: 2022-09-10

## 2022-09-10 RX ORDER — MIDODRINE HYDROCHLORIDE 2.5 MG/1
15 TABLET ORAL THREE TIMES A DAY
Refills: 0 | Status: DISCONTINUED | OUTPATIENT
Start: 2022-09-10 | End: 2022-09-12

## 2022-09-10 RX ORDER — OXYCODONE HYDROCHLORIDE 5 MG/1
10 TABLET ORAL THREE TIMES A DAY
Refills: 0 | Status: DISCONTINUED | OUTPATIENT
Start: 2022-09-10 | End: 2022-09-17

## 2022-09-10 RX ORDER — SODIUM CHLORIDE 9 MG/ML
1000 INJECTION, SOLUTION INTRAVENOUS ONCE
Refills: 0 | Status: DISCONTINUED | OUTPATIENT
Start: 2022-09-10 | End: 2022-09-10

## 2022-09-10 RX ORDER — THIAMINE MONONITRATE (VIT B1) 100 MG
100 TABLET ORAL DAILY
Refills: 0 | Status: DISCONTINUED | OUTPATIENT
Start: 2022-09-10 | End: 2022-09-20

## 2022-09-10 RX ORDER — DEXTROSE 50 % IN WATER 50 %
25 SYRINGE (ML) INTRAVENOUS ONCE
Refills: 0 | Status: DISCONTINUED | OUTPATIENT
Start: 2022-09-10 | End: 2022-09-20

## 2022-09-10 RX ORDER — PIPERACILLIN AND TAZOBACTAM 4; .5 G/20ML; G/20ML
3.38 INJECTION, POWDER, LYOPHILIZED, FOR SOLUTION INTRAVENOUS EVERY 6 HOURS
Refills: 0 | Status: DISCONTINUED | OUTPATIENT
Start: 2022-09-10 | End: 2022-09-10

## 2022-09-10 RX ORDER — ONDANSETRON 8 MG/1
4 TABLET, FILM COATED ORAL ONCE
Refills: 0 | Status: DISCONTINUED | OUTPATIENT
Start: 2022-09-10 | End: 2022-09-10

## 2022-09-10 RX ORDER — NICOTINE POLACRILEX 2 MG
1 GUM BUCCAL DAILY
Refills: 0 | Status: DISCONTINUED | OUTPATIENT
Start: 2022-09-10 | End: 2022-09-20

## 2022-09-10 RX ORDER — MIDODRINE HYDROCHLORIDE 2.5 MG/1
5 TABLET ORAL THREE TIMES A DAY
Refills: 0 | Status: DISCONTINUED | OUTPATIENT
Start: 2022-09-10 | End: 2022-09-10

## 2022-09-10 RX ORDER — MIDODRINE HYDROCHLORIDE 2.5 MG/1
15 TABLET ORAL THREE TIMES A DAY
Refills: 0 | Status: DISCONTINUED | OUTPATIENT
Start: 2022-09-10 | End: 2022-09-10

## 2022-09-10 RX ORDER — CHLORHEXIDINE GLUCONATE 213 G/1000ML
1 SOLUTION TOPICAL
Refills: 0 | Status: DISCONTINUED | OUTPATIENT
Start: 2022-09-10 | End: 2022-09-12

## 2022-09-10 RX ORDER — LACTULOSE 10 G/15ML
20 SOLUTION ORAL EVERY 12 HOURS
Refills: 0 | Status: DISCONTINUED | OUTPATIENT
Start: 2022-09-10 | End: 2022-09-14

## 2022-09-10 RX ORDER — DEXTROSE 50 % IN WATER 50 %
12.5 SYRINGE (ML) INTRAVENOUS ONCE
Refills: 0 | Status: DISCONTINUED | OUTPATIENT
Start: 2022-09-10 | End: 2022-09-20

## 2022-09-10 RX ORDER — FOLIC ACID 0.8 MG
1 TABLET ORAL DAILY
Refills: 0 | Status: DISCONTINUED | OUTPATIENT
Start: 2022-09-10 | End: 2022-09-20

## 2022-09-10 RX ORDER — PANTOPRAZOLE SODIUM 20 MG/1
40 TABLET, DELAYED RELEASE ORAL
Refills: 0 | Status: DISCONTINUED | OUTPATIENT
Start: 2022-09-10 | End: 2022-09-20

## 2022-09-10 RX ORDER — GLUCAGON INJECTION, SOLUTION 0.5 MG/.1ML
1 INJECTION, SOLUTION SUBCUTANEOUS ONCE
Refills: 0 | Status: DISCONTINUED | OUTPATIENT
Start: 2022-09-10 | End: 2022-09-20

## 2022-09-10 RX ORDER — MAGNESIUM SULFATE 500 MG/ML
2 VIAL (ML) INJECTION ONCE
Refills: 0 | Status: COMPLETED | OUTPATIENT
Start: 2022-09-10 | End: 2022-09-10

## 2022-09-10 RX ORDER — ONDANSETRON 8 MG/1
4 TABLET, FILM COATED ORAL ONCE
Refills: 0 | Status: COMPLETED | OUTPATIENT
Start: 2022-09-10 | End: 2022-09-11

## 2022-09-10 RX ADMIN — Medication 25 GRAM(S): at 06:55

## 2022-09-10 RX ADMIN — Medication 2: at 07:00

## 2022-09-10 RX ADMIN — PANTOPRAZOLE SODIUM 40 MILLIGRAM(S): 20 TABLET, DELAYED RELEASE ORAL at 06:55

## 2022-09-10 RX ADMIN — OXYCODONE HYDROCHLORIDE 10 MILLIGRAM(S): 5 TABLET ORAL at 13:55

## 2022-09-10 RX ADMIN — Medication 650 MILLIGRAM(S): at 04:59

## 2022-09-10 RX ADMIN — ERTAPENEM SODIUM 120 MILLIGRAM(S): 1 INJECTION, POWDER, LYOPHILIZED, FOR SOLUTION INTRAMUSCULAR; INTRAVENOUS at 18:23

## 2022-09-10 RX ADMIN — Medication 650 MILLIGRAM(S): at 04:29

## 2022-09-10 RX ADMIN — PIPERACILLIN AND TAZOBACTAM 200 GRAM(S): 4; .5 INJECTION, POWDER, LYOPHILIZED, FOR SOLUTION INTRAVENOUS at 11:38

## 2022-09-10 RX ADMIN — Medication 2: at 22:52

## 2022-09-10 RX ADMIN — OXYCODONE HYDROCHLORIDE 10 MILLIGRAM(S): 5 TABLET ORAL at 22:51

## 2022-09-10 RX ADMIN — MIDODRINE HYDROCHLORIDE 5 MILLIGRAM(S): 2.5 TABLET ORAL at 10:24

## 2022-09-10 RX ADMIN — ONDANSETRON 4 MILLIGRAM(S): 8 TABLET, FILM COATED ORAL at 04:34

## 2022-09-10 RX ADMIN — OXYCODONE HYDROCHLORIDE 10 MILLIGRAM(S): 5 TABLET ORAL at 14:55

## 2022-09-10 RX ADMIN — Medication 1 PATCH: at 17:17

## 2022-09-10 RX ADMIN — Medication 2: at 11:34

## 2022-09-10 RX ADMIN — CHLORHEXIDINE GLUCONATE 1 APPLICATION(S): 213 SOLUTION TOPICAL at 06:56

## 2022-09-10 RX ADMIN — MIDODRINE HYDROCHLORIDE 5 MILLIGRAM(S): 2.5 TABLET ORAL at 11:37

## 2022-09-10 RX ADMIN — OXYCODONE HYDROCHLORIDE 10 MILLIGRAM(S): 5 TABLET ORAL at 02:21

## 2022-09-10 RX ADMIN — LACTULOSE 20 GRAM(S): 10 SOLUTION ORAL at 17:17

## 2022-09-10 RX ADMIN — Medication 2: at 15:42

## 2022-09-10 RX ADMIN — Medication 40 MILLIEQUIVALENT(S): at 06:56

## 2022-09-10 RX ADMIN — OXYCODONE HYDROCHLORIDE 10 MILLIGRAM(S): 5 TABLET ORAL at 07:05

## 2022-09-10 RX ADMIN — Medication 1 PATCH: at 18:23

## 2022-09-10 RX ADMIN — PIPERACILLIN AND TAZOBACTAM 200 GRAM(S): 4; .5 INJECTION, POWDER, LYOPHILIZED, FOR SOLUTION INTRAVENOUS at 08:05

## 2022-09-10 RX ADMIN — MIDODRINE HYDROCHLORIDE 15 MILLIGRAM(S): 2.5 TABLET ORAL at 22:51

## 2022-09-10 RX ADMIN — Medication 100 MILLIGRAM(S): at 11:36

## 2022-09-10 RX ADMIN — MIDODRINE HYDROCHLORIDE 10 MILLIGRAM(S): 2.5 TABLET ORAL at 13:55

## 2022-09-10 RX ADMIN — OXYCODONE HYDROCHLORIDE 10 MILLIGRAM(S): 5 TABLET ORAL at 23:50

## 2022-09-10 RX ADMIN — Medication 1 MILLIGRAM(S): at 11:37

## 2022-09-10 RX ADMIN — OXYCODONE HYDROCHLORIDE 10 MILLIGRAM(S): 5 TABLET ORAL at 06:55

## 2022-09-10 RX ADMIN — Medication 1 TABLET(S): at 11:38

## 2022-09-10 RX ADMIN — PIPERACILLIN AND TAZOBACTAM 200 GRAM(S): 4; .5 INJECTION, POWDER, LYOPHILIZED, FOR SOLUTION INTRAVENOUS at 02:21

## 2022-09-10 RX ADMIN — OXYCODONE HYDROCHLORIDE 10 MILLIGRAM(S): 5 TABLET ORAL at 02:45

## 2022-09-10 NOTE — PROGRESS NOTE ADULT - ASSESSMENT
50 y/o man with PMH significant for cirrhosis, DM with previously untreated ESBL UTI, presenting with rigors, AMS, fever and hemodynamic instability with elevated WBC count likely due to multiorgan failure 2/2 UTI and bacteremia.  52 y/o man with PMH significant for cirrhosis, DM with previously untreated ESBL UTI, presenting with rigors, AMS, fever and hemodynamic instability with elevated WBC count likely due to multiorgan failure 2/2 UTI and bacteremia.

## 2022-09-10 NOTE — PROCEDURE NOTE - NSUSCPTCODES_ED_ALL
33248 US Chest (PTX, Pleural Effussion/CHF vs COPD) 12543 US Chest (PTX, Pleural Effussion/CHF vs COPD) 89138 US Chest (PTX, Pleural Effussion/CHF vs COPD)

## 2022-09-10 NOTE — PROGRESS NOTE ADULT - ATTENDING COMMENTS
52 yo M w/ cirrhosis 2/2 ETOH, DM, recently admitted and discharged w/ + ESBL UTI (pt unable to be reached) now presents w/ septic shock 2/2 E coli bacteremia likely from UTI. Sensitive to ertapenem. Weaned off levophed. Bedside POCUS with A line pattern B/L lungs, trace ascites without significant pocket for drainage, cardiac exam grossly normal w/ normal respiratory variation of IVC, good UOP, lactate cleared. Relative low BP may also be related to cirrhosis and vasodilatory state, start midodrine 10mg TID and wean as infection improves. Encephalopathy has improved, at baseline MS, c/w lactulose q8. LINNETTE improving, likely ATN 2/2 sepsis and hypotension. Continue to monitor. Stable for transfer to [facility]. 50 yo M w/ cirrhosis 2/2 ETOH, DM, recently admitted and discharged w/ + ESBL UTI (pt unable to be reached) now presents w/ septic shock 2/2 E coli bacteremia likely from UTI. Sensitive to ertapenem. Weaned off levophed. Bedside POCUS with A line pattern B/L lungs, trace ascites without significant pocket for drainage, cardiac exam grossly normal w/ normal respiratory variation of IVC, good UOP, lactate cleared. Relative low BP may also be related to cirrhosis and vasodilatory state, start midodrine 10mg TID and wean as infection improves. Encephalopathy has improved, at baseline MS, c/w lactulose q8. LINNETTE improving, likely ATN 2/2 sepsis and hypotension. Continue to monitor. Stable for transfer to [facility]. 50 yo M w/ cirrhosis 2/2 ETOH, DM, recently admitted and discharged w/ + ESBL UTI (pt unable to be reached) now presents w/ septic shock 2/2 E coli bacteremia likely from UTI. Sensitive to ertapenem. Weaned off levophed. Bedside POCUS with A line pattern B/L lungs, trace ascites without significant pocket for drainage, cardiac exam grossly normal w/ normal respiratory variation of IVC, good UOP, lactate cleared. Relative low BP may also be related to cirrhosis and vasodilatory state, start midodrine 10mg TID and wean as infection improves. Encephalopathy has improved, at baseline MS, c/w lactulose q8. LINNETTE improving, likely ATN 2/2 sepsis and hypotension. Continue to monitor. Stable for transfer to [facility].

## 2022-09-10 NOTE — PROCEDURE NOTE - NSUS ED ADDITIONAL DETAIL1 FT
Distended urinary bladder. No hydro.
normal biventricular function
Mild abdominal ascites, simple without septations,
A line pattern b/l. Normal pleura. No effusions or consolidations.

## 2022-09-10 NOTE — PATIENT PROFILE ADULT - VISION (WITH CORRECTIVE LENSES IF THE PATIENT USUALLY WEARS THEM):
Wears Progressive lenses for reading and distance./Partially impaired: cannot see medication labels or newsprint, but can see obstacles in path, and the surrounding layout; can count fingers at arm's length

## 2022-09-10 NOTE — PATIENT PROFILE ADULT - NSPROGENOTHERPROVIDER_GEN_A_NUR
[facility] agency [facility] agency [facility] agency

## 2022-09-10 NOTE — PROGRESS NOTE ADULT - PROBLEM SELECTOR PLAN 7
A1C @6 on Sep 5th   Pt not on any meds at home  ISS A1C @6 on Sep 5th   Pt not on any meds at home  Insulin sliding scale.

## 2022-09-10 NOTE — PROGRESS NOTE ADULT - PROBLEM SELECTOR PLAN 1
Multifactorial 2/2 cirrhosis/ammonia, septic shock, alcohol withdrawal and malnourished (hypomagnesemia, hypophosphatemia). Blood alcohol level <3.  - F/u Ammonia level   - Replete electrolytes   - CIWA protocol (Ativan for CIWA >8)  - Start multivitamin, thiamine and folate Multifactorial 2/2 cirrhosis/ammonia, septic shock, alcohol withdrawal and malnourished (hypomagnesemia, hypophosphatemia). Blood alcohol level <3.    - F/u Ammonia level   - Replete electrolytes   - CIWA protocol (Ativan for CIWA >8)  - Start multivitamin, thiamine and folate

## 2022-09-10 NOTE — PATIENT PROFILE ADULT - FALL HARM RISK - HARM RISK INTERVENTIONS
Assistance with ambulation/Assistance OOB with selected safe patient handling equipment/Communicate Risk of Fall with Harm to all staff/Reinforce activity limits and safety measures with patient and family/Review medications for side effects contributing to fall risk/Sit up slowly, dangle for a short time, stand at bedside before walking/Tailored Fall Risk Interventions/Toileting schedule using arm’s reach rule for commode and bathroom/Visual Cue: Yellow wristband and red socks/Bed in lowest position, wheels locked, appropriate side rails in place/Call bell, personal items and telephone in reach/Instruct patient to call for assistance before getting out of bed or chair/Non-slip footwear when patient is out of bed/[place] to call system/Physically safe environment - no spills, clutter or unnecessary equipment/Purposeful Proactive Rounding/Room/bathroom lighting operational, light cord in reach Assistance with ambulation/Assistance OOB with selected safe patient handling equipment/Communicate Risk of Fall with Harm to all staff/Reinforce activity limits and safety measures with patient and family/Review medications for side effects contributing to fall risk/Sit up slowly, dangle for a short time, stand at bedside before walking/Tailored Fall Risk Interventions/Toileting schedule using arm’s reach rule for commode and bathroom/Visual Cue: Yellow wristband and red socks/Bed in lowest position, wheels locked, appropriate side rails in place/Call bell, personal items and telephone in reach/Instruct patient to call for assistance before getting out of bed or chair/Non-slip footwear when patient is out of bed/[place] to call system/Physically safe environment - no spills, clutter or unnecessary equipment/Purposeful Proactive Rounding/Room/bathroom lighting operational, light cord in reach Assistance with ambulation/Assistance OOB with selected safe patient handling equipment/Communicate Risk of Fall with Harm to all staff/Reinforce activity limits and safety measures with patient and family/Review medications for side effects contributing to fall risk/Sit up slowly, dangle for a short time, stand at bedside before walking/Tailored Fall Risk Interventions/Toileting schedule using arm’s reach rule for commode and bathroom/Visual Cue: Yellow wristband and red socks/Bed in lowest position, wheels locked, appropriate side rails in place/Call bell, personal items and telephone in reach/Instruct patient to call for assistance before getting out of bed or chair/Non-slip footwear when patient is out of bed/[place] to call system/Physically safe environment - no spills, clutter or unnecessary equipment/Purposeful Proactive Rounding/Room/bathroom lighting operational, light cord in reach

## 2022-09-10 NOTE — PROGRESS NOTE ADULT - PROBLEM SELECTOR PLAN 2
Likely source UTI (ESBL)  Patient meeting sepsis criteria (tachycardic, febrile)  He has low Bp, WBC of 10.74, lactate 2.5, elevated Cr (1.72), and elevated bili (1.5) and AST  Improving: Good UOP, Cr improved, bili improved, mental status is better, lactate cleared, euvolemic with good perfusion. Weaned off of levo.  Blood cultures positive for gram negative rods in all 4 bottles.  - F/u urine cultures  - f/u blood susceptibilities  - C/w Zosyn   - Patient tolerating PO no need for fluids at this time Likely source UTI (ESBL)  Patient meeting sepsis criteria (tachycardic, febrile)  He has low Bp, WBC of 10.74, lactate 2.5, elevated Cr (1.72), and elevated bili (1.5) and AST  Improving: Good UOP, Cr improved, bili improved, mental status is better, lactate cleared, euvolemic with good perfusion. Weaned off of levo.  Blood cultures positive for gram negative rods in all 4 bottles.  - F/u urine cultures  - f/u blood susceptibilities  - d/c zosyn start ertapenem per ID 1g IV q24h  - Patient tolerating PO no need for fluids at this time

## 2022-09-10 NOTE — PROGRESS NOTE ADULT - PROBLEM SELECTOR PLAN 3
- F/u urine and blood cultures   - C/w Zosyn - F/u urine and blood cultures   - ertapenem 1g IV q 24h

## 2022-09-10 NOTE — PROGRESS NOTE ADULT - SUBJECTIVE AND OBJECTIVE BOX
TRANSFER FROM MICU TO [facility]:    HOSPITAL COURSE:  52 y/o man with PMH significant for cirrhosis, DM with previously untreated ESBL UTI, presenting with rigors, AMS, fever and hemodynamic instability. Presented to [facility] [place], found to have T 102.5, , BP 88/47->89/40, SpO2 97, WBC 10.47, Hb 9.2 (baseline), Plt 79, Cr 1.72, lactate 2.5. UA negative nitrites and positive leuk esterase. Met sepsis criteria (tachycardia, febrile) In the ED, he was given acetaminophen and ibuprofen, ceftriaxone, zosyn, bactrim, NS 2.8L and started levophed. On arrival to the MICU, patient was stable and weaned off of levophed, continued on zosyn. Sepsis improved, good UOP, Cr 1.42, mental status improved, lactate cleared. On room air, tolerating PO intake. Added midrodine 5mg TID. Blood cultures positive for gram negative rods, susceptibilities to follow. Stable for transfer from MICU to [facility].    INTERVAL HPI/OVERNIGHT EVENTS:  Overnight given tylenol for fever and ondansetron for nausea. Patient was seen and examined at bedside. Feeling better and able to ambulate. Urinating frequently. ROS otherwise negative.    VITAL SIGNS:  T(F): 96.8 (09-10-22 @ 10:38)  HR: 95 (09-10-22 @ 11:00)  BP: 96/56 (09-10-22 @ 11:00)  RR: 15 (09-10-22 @ 11:00)  SpO2: 96% (09-10-22 @ 11:00)  Wt(kg): --      22 @ 07:01  -  09-10-22 @ 07:00  --------------------------------------------------------  IN: 2400 mL / OUT: 530 mL / NET: 1870 mL    09-10-22 @ 07:01  -  09-10-22 @ 11:38  --------------------------------------------------------  IN: 0 mL / OUT: 300 mL / NET: -300 mL        PHYSICAL EXAM:    Constitutional: resting comfortably in bed; NAD  HEENT: NC/AT, PER, anicteric sclera, no nasal discharge; MMM; neck supple  Respiratory: CTA B/L; no W/R/R, no retractions  Cardiac: +S1/S2; RRR; no M/R/G  Gastrointestinal: soft, NT/ND; no rebound or guarding  Extremities: WWP, no clubbing or cyanosis; no peripheral edema  Musculoskeletal: NROM x4; no joint swelling, tenderness or erythema  Vascular: 2+ radial, DP/PT pulses B/L  Dermatologic: skin warm, dry and intact; no rashes, wounds, or scars  Neurologic: AAOx3; CNII-XII grossly intact; no focal deficits    MEDICATIONS  (STANDING):  chlorhexidine 2% Cloths 1 Application(s) Topical <User Schedule>  dextrose 5%. 1000 milliLiter(s) (50 mL/Hr) IV Continuous <Continuous>  dextrose 5%. 1000 milliLiter(s) (100 mL/Hr) IV Continuous <Continuous>  dextrose 50% Injectable 25 Gram(s) IV Push once  dextrose 50% Injectable 12.5 Gram(s) IV Push once  dextrose 50% Injectable 25 Gram(s) IV Push once  folic acid 1 milliGRAM(s) Oral daily  glucagon  Injectable 1 milliGRAM(s) IntraMuscular once  insulin lispro (ADMELOG) corrective regimen sliding scale   SubCutaneous Before meals and at bedtime  lactulose Syrup 20 Gram(s) Oral every 12 hours  midodrine. 5 milliGRAM(s) Oral three times a day  multivitamin/minerals 1 Tablet(s) Oral daily  oxyCODONE    IR 10 milliGRAM(s) Oral three times a day  pantoprazole    Tablet 40 milliGRAM(s) Oral before breakfast  piperacillin/tazobactam IVPB.. 3.375 Gram(s) IV Intermittent every 6 hours  thiamine 100 milliGRAM(s) Oral daily    MEDICATIONS  (PRN):  dextrose Oral Gel 15 Gram(s) Oral once PRN Blood Glucose LESS THAN 70 milliGRAM(s)/deciliter      Allergies    No Known Allergies    Intolerances        LABS:                        8.4    8.16  )-----------( 74       ( 10 Sep 2022 04:04 )             25.2     09-10    130<L>  |  96  |  34<H>  ----------------------------<  153<H>  3.6   |  22  |  1.42<H>    Ca    8.7      10 Sep 2022 04:04  Phos  3.3     10  Mg     1.8     09-10    TPro  6.9  /  Alb  3.4  /  TBili  1.0  /  DBili  x   /  AST  21  /  ALT  11  /  AlkPhos  93  09-10    PT/INR - ( 09 Sep 2022 15:15 )   PT: 19.5 sec;   INR: 1.67          PTT - ( 09 Sep 2022 15:15 )  PTT:30.6 sec  Urinalysis Basic - ( 09 Sep 2022 15:15 )    Color: Yellow / Appearance: Clear / S.010 / pH: x  Gluc: x / Ketone: NEGATIVE  / Bili: NEGATIVE / Urobili: 1.0 E.U./dL   Blood: x / Protein: Trace mg/dL / Nitrite: NEGATIVE   Leuk Esterase: Small / RBC: < 5 /HPF / WBC Many /HPF   Sq Epi: x / Non Sq Epi: 5-10 /HPF / Bacteria: Present /HPF        RADIOLOGY & ADDITIONAL TESTS:  Reviewed TRANSFER FROM MICU TO [facility]:    HOSPITAL COURSE:  50 y/o man with PMH significant for cirrhosis, DM with previously untreated ESBL UTI, presenting with rigors, AMS, fever and hemodynamic instability. Presented to [facility], found to have T 102.5, , BP 88/47->89/40, SpO2 97, WBC 10.47, Hb 9.2 (baseline), Plt 79, Cr 1.72, lactate 2.5. UA negative nitrites and positive leuk esterase. Met sepsis criteria (tachycardia, febrile) In the ED, he was given acetaminophen and ibuprofen, ceftriaxone, zosyn, bactrim, NS 2.8L and started levophed. On arrival to the MICU, patient was stable and weaned off of levophed, continued on zosyn. Sepsis improved, good UOP, Cr 1.42, mental status improved, lactate cleared. On room air, tolerating PO intake. Added midrodine 5mg TID. Blood cultures positive for gram negative rods, susceptibilities to follow. Stable for transfer from MICU to [facility].    INTERVAL HPI/OVERNIGHT EVENTS:  Overnight given tylenol for fever and ondansetron for nausea. Patient was seen and examined at bedside. Feeling better and able to ambulate. Urinating frequently. ROS otherwise negative.    VITAL SIGNS:  T(F): 96.8 (09-10-22 @ 10:38)  HR: 95 (09-10-22 @ 11:00)  BP: 96/56 (09-10-22 @ 11:00)  RR: 15 (09-10-22 @ 11:00)  SpO2: 96% (09-10-22 @ 11:00)  Wt(kg): --      22 @ 07:01  -  09-10-22 @ 07:00  --------------------------------------------------------  IN: 2400 mL / OUT: 530 mL / NET: 1870 mL    09-10-22 @ 07:01  -  09-10-22 @ 11:38  --------------------------------------------------------  IN: 0 mL / OUT: 300 mL / NET: -300 mL        PHYSICAL EXAM:    Constitutional: resting comfortably in bed; NAD  HEENT: NC/AT, PER, anicteric sclera, no nasal discharge; MMM; neck supple  Respiratory: CTA B/L; no W/R/R, no retractions  Cardiac: +S1/S2; RRR; no M/R/G  Gastrointestinal: soft, NT/ND; no rebound or guarding  Extremities: WWP, no clubbing or cyanosis; no peripheral edema  Musculoskeletal: NROM x4; no joint swelling, tenderness or erythema  Vascular: 2+ radial, DP/PT pulses B/L  Dermatologic: skin warm, dry and intact; no rashes, wounds, or scars  Neurologic: AAOx3; CNII-XII grossly intact; no focal deficits    MEDICATIONS  (STANDING):  chlorhexidine 2% Cloths 1 Application(s) Topical <User Schedule>  dextrose 5%. 1000 milliLiter(s) (50 mL/Hr) IV Continuous <Continuous>  dextrose 5%. 1000 milliLiter(s) (100 mL/Hr) IV Continuous <Continuous>  dextrose 50% Injectable 25 Gram(s) IV Push once  dextrose 50% Injectable 12.5 Gram(s) IV Push once  dextrose 50% Injectable 25 Gram(s) IV Push once  folic acid 1 milliGRAM(s) Oral daily  glucagon  Injectable 1 milliGRAM(s) IntraMuscular once  insulin lispro (ADMELOG) corrective regimen sliding scale   SubCutaneous Before meals and at bedtime  lactulose Syrup 20 Gram(s) Oral every 12 hours  midodrine. 5 milliGRAM(s) Oral three times a day  multivitamin/minerals 1 Tablet(s) Oral daily  oxyCODONE    IR 10 milliGRAM(s) Oral three times a day  pantoprazole    Tablet 40 milliGRAM(s) Oral before breakfast  piperacillin/tazobactam IVPB.. 3.375 Gram(s) IV Intermittent every 6 hours  thiamine 100 milliGRAM(s) Oral daily    MEDICATIONS  (PRN):  dextrose Oral Gel 15 Gram(s) Oral once PRN Blood Glucose LESS THAN 70 milliGRAM(s)/deciliter      Allergies    No Known Allergies    Intolerances        LABS:                        8.4    8.16  )-----------( 74       ( 10 Sep 2022 04:04 )             25.2     09-10    130<L>  |  96  |  34<H>  ----------------------------<  153<H>  3.6   |  22  |  1.42<H>    Ca    8.7      10 Sep 2022 04:04  Phos  3.3     10  Mg     1.8     09-10    TPro  6.9  /  Alb  3.4  /  TBili  1.0  /  DBili  x   /  AST  21  /  ALT  11  /  AlkPhos  93  09-10    PT/INR - ( 09 Sep 2022 15:15 )   PT: 19.5 sec;   INR: 1.67          PTT - ( 09 Sep 2022 15:15 )  PTT:30.6 sec  Urinalysis Basic - ( 09 Sep 2022 15:15 )    Color: Yellow / Appearance: Clear / S.010 / pH: x  Gluc: x / Ketone: NEGATIVE  / Bili: NEGATIVE / Urobili: 1.0 E.U./dL   Blood: x / Protein: Trace mg/dL / Nitrite: NEGATIVE   Leuk Esterase: Small / RBC: < 5 /HPF / WBC Many /HPF   Sq Epi: x / Non Sq Epi: 5-10 /HPF / Bacteria: Present /HPF        RADIOLOGY & ADDITIONAL TESTS:  Reviewed TRANSFER FROM MICU TO [facility]:    HOSPITAL COURSE:  52 y/o man with PMH significant for cirrhosis, DM with previously untreated ESBL UTI, presenting with rigors, AMS, fever and hemodynamic instability. Presented to [facility], found to have T 102.5, , BP 88/47->89/40, SpO2 97, WBC 10.47, Hb 9.2 (baseline), Plt 79, Cr 1.72, lactate 2.5. UA negative nitrites and positive leuk esterase. Met sepsis criteria (tachycardia, febrile) In the ED, he was given acetaminophen and ibuprofen, ceftriaxone, zosyn, bactrim, NS 2.8L and started levophed. On arrival to the MICU, patient was stable and weaned off of levophed, continued on zosyn. Sepsis improved, good UOP, Cr 1.42, mental status improved, lactate cleared. On room air, tolerating PO intake. Added midrodine 5mg TID. Blood cultures positive for gram negative rods, susceptibilities to follow. Stable for transfer from MICU to [facility].    INTERVAL HPI/OVERNIGHT EVENTS:  Overnight given tylenol for fever and ondansetron for nausea. Patient was seen and examined at bedside. Feeling better and able to ambulate. Urinating frequently. ROS otherwise negative.    VITAL SIGNS:  T(F): 96.8 (09-10-22 @ 10:38)  HR: 95 (09-10-22 @ 11:00)  BP: 96/56 (09-10-22 @ 11:00)  RR: 15 (09-10-22 @ 11:00)  SpO2: 96% (09-10-22 @ 11:00)  Wt(kg): --      22 @ 07:01  -  09-10-22 @ 07:00  --------------------------------------------------------  IN: 2400 mL / OUT: 530 mL / NET: 1870 mL    09-10-22 @ 07:01  -  09-10-22 @ 11:38  --------------------------------------------------------  IN: 0 mL / OUT: 300 mL / NET: -300 mL        PHYSICAL EXAM:    Constitutional: resting comfortably in bed; NAD  HEENT: NC/AT, PER, anicteric sclera, no nasal discharge; MMM; neck supple  Respiratory: CTA B/L; no W/R/R, no retractions  Cardiac: +S1/S2; RRR; no M/R/G  Gastrointestinal: soft, NT/ND; no rebound or guarding  Extremities: WWP, no clubbing or cyanosis; no peripheral edema  Musculoskeletal: NROM x4; no joint swelling, tenderness or erythema  Vascular: 2+ radial, DP/PT pulses B/L  Dermatologic: skin warm, dry and intact; no rashes, wounds, or scars  Neurologic: AAOx3; CNII-XII grossly intact; no focal deficits    MEDICATIONS  (STANDING):  chlorhexidine 2% Cloths 1 Application(s) Topical <User Schedule>  dextrose 5%. 1000 milliLiter(s) (50 mL/Hr) IV Continuous <Continuous>  dextrose 5%. 1000 milliLiter(s) (100 mL/Hr) IV Continuous <Continuous>  dextrose 50% Injectable 25 Gram(s) IV Push once  dextrose 50% Injectable 12.5 Gram(s) IV Push once  dextrose 50% Injectable 25 Gram(s) IV Push once  folic acid 1 milliGRAM(s) Oral daily  glucagon  Injectable 1 milliGRAM(s) IntraMuscular once  insulin lispro (ADMELOG) corrective regimen sliding scale   SubCutaneous Before meals and at bedtime  lactulose Syrup 20 Gram(s) Oral every 12 hours  midodrine. 5 milliGRAM(s) Oral three times a day  multivitamin/minerals 1 Tablet(s) Oral daily  oxyCODONE    IR 10 milliGRAM(s) Oral three times a day  pantoprazole    Tablet 40 milliGRAM(s) Oral before breakfast  piperacillin/tazobactam IVPB.. 3.375 Gram(s) IV Intermittent every 6 hours  thiamine 100 milliGRAM(s) Oral daily    MEDICATIONS  (PRN):  dextrose Oral Gel 15 Gram(s) Oral once PRN Blood Glucose LESS THAN 70 milliGRAM(s)/deciliter      Allergies    No Known Allergies    Intolerances        LABS:                        8.4    8.16  )-----------( 74       ( 10 Sep 2022 04:04 )             25.2     09-10    130<L>  |  96  |  34<H>  ----------------------------<  153<H>  3.6   |  22  |  1.42<H>    Ca    8.7      10 Sep 2022 04:04  Phos  3.3     10  Mg     1.8     09-10    TPro  6.9  /  Alb  3.4  /  TBili  1.0  /  DBili  x   /  AST  21  /  ALT  11  /  AlkPhos  93  09-10    PT/INR - ( 09 Sep 2022 15:15 )   PT: 19.5 sec;   INR: 1.67          PTT - ( 09 Sep 2022 15:15 )  PTT:30.6 sec  Urinalysis Basic - ( 09 Sep 2022 15:15 )    Color: Yellow / Appearance: Clear / S.010 / pH: x  Gluc: x / Ketone: NEGATIVE  / Bili: NEGATIVE / Urobili: 1.0 E.U./dL   Blood: x / Protein: Trace mg/dL / Nitrite: NEGATIVE   Leuk Esterase: Small / RBC: < 5 /HPF / WBC Many /HPF   Sq Epi: x / Non Sq Epi: 5-10 /HPF / Bacteria: Present /HPF        RADIOLOGY & ADDITIONAL TESTS:  Reviewed TRANSFER FROM MICU TO [facility]:    HOSPITAL COURSE:  52 y/o man with PMH significant for cirrhosis, DM with previously untreated ESBL UTI, presenting with rigors, AMS, fever and hemodynamic instability. Presented to [facility], found to have T 102.5, , BP 88/47->89/40, SpO2 97, WBC 10.47, Hb 9.2 (baseline), Plt 79, Cr 1.72, lactate 2.5. UA negative nitrites and positive leuk esterase. Previously admitted to [facility] on 22, urine grew ESBL, untreated. During this admission met sepsis criteria (tachycardia, febrile) In the ED, he was given acetaminophen and ibuprofen, ceftriaxone, zosyn, bactrim, NS 2.8L and started levophed. On arrival to the MICU, patient was stable and weaned off of levophed, continued on zosyn. Sepsis improved, good UOP, Cr 1.42, mental status improved, lactate cleared. On room air, tolerating PO intake. Added midrodine 5mg TID. Blood cultures positive for gram negative rods, susceptibilities to follow. Stable for transfer from MICU to [facility].    INTERVAL HPI/OVERNIGHT EVENTS:  Overnight given tylenol for fever and ondansetron for nausea. Patient was seen and examined at bedside. Feeling better and able to ambulate. Urinating frequently. ROS otherwise negative.    VITAL SIGNS:  T(F): 96.8 (09-10-22 @ 10:38)  HR: 95 (09-10-22 @ 11:00)  BP: 96/56 (09-10-22 @ 11:00)  RR: 15 (09-10-22 @ 11:00)  SpO2: 96% (09-10-22 @ 11:00)  Wt(kg): --      22 @ 07:01  -  09-10-22 @ 07:00  --------------------------------------------------------  IN: 2400 mL / OUT: 530 mL / NET: 1870 mL    09-10-22 @ 07:01  -  09-10-22 @ 11:38  --------------------------------------------------------  IN: 0 mL / OUT: 300 mL / NET: -300 mL        PHYSICAL EXAM:    Constitutional: resting comfortably in bed; NAD  HEENT: NC/AT, PER, anicteric sclera, no nasal discharge; MMM; neck supple  Respiratory: CTA B/L; no W/R/R, no retractions  Cardiac: +S1/S2; RRR; no M/R/G  Gastrointestinal: soft, NT/ND; no rebound or guarding  Extremities: WWP, no clubbing or cyanosis; no peripheral edema  Musculoskeletal: NROM x4; no joint swelling, tenderness or erythema  Vascular: 2+ radial, DP/PT pulses B/L  Dermatologic: skin warm, dry and intact; no rashes, wounds, or scars  Neurologic: AAOx3; CNII-XII grossly intact; no focal deficits    MEDICATIONS  (STANDING):  chlorhexidine 2% Cloths 1 Application(s) Topical <User Schedule>  dextrose 5%. 1000 milliLiter(s) (50 mL/Hr) IV Continuous <Continuous>  dextrose 5%. 1000 milliLiter(s) (100 mL/Hr) IV Continuous <Continuous>  dextrose 50% Injectable 25 Gram(s) IV Push once  dextrose 50% Injectable 12.5 Gram(s) IV Push once  dextrose 50% Injectable 25 Gram(s) IV Push once  folic acid 1 milliGRAM(s) Oral daily  glucagon  Injectable 1 milliGRAM(s) IntraMuscular once  insulin lispro (ADMELOG) corrective regimen sliding scale   SubCutaneous Before meals and at bedtime  lactulose Syrup 20 Gram(s) Oral every 12 hours  midodrine. 5 milliGRAM(s) Oral three times a day  multivitamin/minerals 1 Tablet(s) Oral daily  oxyCODONE    IR 10 milliGRAM(s) Oral three times a day  pantoprazole    Tablet 40 milliGRAM(s) Oral before breakfast  piperacillin/tazobactam IVPB.. 3.375 Gram(s) IV Intermittent every 6 hours  thiamine 100 milliGRAM(s) Oral daily    MEDICATIONS  (PRN):  dextrose Oral Gel 15 Gram(s) Oral once PRN Blood Glucose LESS THAN 70 milliGRAM(s)/deciliter      Allergies    No Known Allergies    Intolerances        LABS:                        8.4    8.16  )-----------( 74       ( 10 Sep 2022 04:04 )             25.2     09-10    130<L>  |  96  |  34<H>  ----------------------------<  153<H>  3.6   |  22  |  1.42<H>    Ca    8.7      10 Sep 2022 04:04  Phos  3.3     09-10  Mg     1.8     -10    TPro  6.9  /  Alb  3.4  /  TBili  1.0  /  DBili  x   /  AST  21  /  ALT  11  /  AlkPhos  93  09-10    PT/INR - ( 09 Sep 2022 15:15 )   PT: 19.5 sec;   INR: 1.67          PTT - ( 09 Sep 2022 15:15 )  PTT:30.6 sec  Urinalysis Basic - ( 09 Sep 2022 15:15 )    Color: Yellow / Appearance: Clear / S.010 / pH: x  Gluc: x / Ketone: NEGATIVE  / Bili: NEGATIVE / Urobili: 1.0 E.U./dL   Blood: x / Protein: Trace mg/dL / Nitrite: NEGATIVE   Leuk Esterase: Small / RBC: < 5 /HPF / WBC Many /HPF   Sq Epi: x / Non Sq Epi: 5-10 /HPF / Bacteria: Present /HPF        RADIOLOGY & ADDITIONAL TESTS:  Reviewed TRANSFER FROM MICU TO [facility]:    HOSPITAL COURSE:  52 y/o man with PMH significant for cirrhosis, DM with previously untreated ESBL UTI, presenting with rigors, AMS, fever and hemodynamic instability. Presented to [facility], found to have T 102.5, , BP 88/47->89/40, SpO2 97, WBC 10.47, Hb 9.2 (baseline), Plt 79, Cr 1.72, lactate 2.5. UA negative nitrites and positive leuk esterase. Previously admitted to [facility] on 22, urine grew ESBL, untreated. During this admission met sepsis criteria (tachycardia, febrile) In the ED, he was given acetaminophen and ibuprofen, ceftriaxone, zosyn, bactrim, NS 2.8L and started levophed. On arrival to the MICU, patient was stable and weaned off of levophed, continued on zosyn. Sepsis improved, good UOP, Cr 1.42, mental status improved, lactate cleared. On room air, tolerating PO intake. Added midrodine 5mg TID. Blood cultures positive for gram negative rods, susceptibilities to follow. Stable for transfer from MICU to [facility].    INTERVAL HPI/OVERNIGHT EVENTS:  Overnight given tylenol for fever and ondansetron for nausea. Patient was seen and examined at bedside. Feeling better and able to ambulate. Urinating frequently. ROS otherwise negative.    VITAL SIGNS:  T(F): 96.8 (09-10-22 @ 10:38)  HR: 95 (09-10-22 @ 11:00)  BP: 96/56 (09-10-22 @ 11:00)  RR: 15 (09-10-22 @ 11:00)  SpO2: 96% (09-10-22 @ 11:00)  Wt(kg): --      22 @ 07:01  -  09-10-22 @ 07:00  --------------------------------------------------------  IN: 2400 mL / OUT: 530 mL / NET: 1870 mL    09-10-22 @ 07:01  -  09-10-22 @ 11:38  --------------------------------------------------------  IN: 0 mL / OUT: 300 mL / NET: -300 mL        PHYSICAL EXAM:    Constitutional: resting comfortably in bed; NAD  HEENT: NC/AT, PER, anicteric sclera, no nasal discharge; MMM; neck supple  Respiratory: CTA B/L; no W/R/R, no retractions  Cardiac: +S1/S2; RRR; no M/R/G  Gastrointestinal: soft, NT/ND; no rebound or guarding  Extremities: WWP, no clubbing or cyanosis; no peripheral edema  Musculoskeletal: NROM x4; no joint swelling, tenderness or erythema  Vascular: 2+ radial, DP/PT pulses B/L  Dermatologic: skin warm, dry and intact; no rashes, wounds, or scars  Neurologic: AAOx3; CNII-XII grossly intact; no focal deficits    MEDICATIONS  (STANDING):  chlorhexidine 2% Cloths 1 Application(s) Topical <User Schedule>  dextrose 5%. 1000 milliLiter(s) (50 mL/Hr) IV Continuous <Continuous>  dextrose 5%. 1000 milliLiter(s) (100 mL/Hr) IV Continuous <Continuous>  dextrose 50% Injectable 25 Gram(s) IV Push once  dextrose 50% Injectable 12.5 Gram(s) IV Push once  dextrose 50% Injectable 25 Gram(s) IV Push once  folic acid 1 milliGRAM(s) Oral daily  glucagon  Injectable 1 milliGRAM(s) IntraMuscular once  insulin lispro (ADMELOG) corrective regimen sliding scale   SubCutaneous Before meals and at bedtime  lactulose Syrup 20 Gram(s) Oral every 12 hours  midodrine. 5 milliGRAM(s) Oral three times a day  multivitamin/minerals 1 Tablet(s) Oral daily  oxyCODONE    IR 10 milliGRAM(s) Oral three times a day  pantoprazole    Tablet 40 milliGRAM(s) Oral before breakfast  piperacillin/tazobactam IVPB.. 3.375 Gram(s) IV Intermittent every 6 hours  thiamine 100 milliGRAM(s) Oral daily    MEDICATIONS  (PRN):  dextrose Oral Gel 15 Gram(s) Oral once PRN Blood Glucose LESS THAN 70 milliGRAM(s)/deciliter      Allergies    No Known Allergies    Intolerances        LABS:                        8.4    8.16  )-----------( 74       ( 10 Sep 2022 04:04 )             25.2     09-10    130<L>  |  96  |  34<H>  ----------------------------<  153<H>  3.6   |  22  |  1.42<H>    Ca    8.7      10 Sep 2022 04:04  Phos  3.3     09-10  Mg     1.8     -10    TPro  6.9  /  Alb  3.4  /  TBili  1.0  /  DBili  x   /  AST  21  /  ALT  11  /  AlkPhos  93  09-10    PT/INR - ( 09 Sep 2022 15:15 )   PT: 19.5 sec;   INR: 1.67          PTT - ( 09 Sep 2022 15:15 )  PTT:30.6 sec  Urinalysis Basic - ( 09 Sep 2022 15:15 )    Color: Yellow / Appearance: Clear / S.010 / pH: x  Gluc: x / Ketone: NEGATIVE  / Bili: NEGATIVE / Urobili: 1.0 E.U./dL   Blood: x / Protein: Trace mg/dL / Nitrite: NEGATIVE   Leuk Esterase: Small / RBC: < 5 /HPF / WBC Many /HPF   Sq Epi: x / Non Sq Epi: 5-10 /HPF / Bacteria: Present /HPF        RADIOLOGY & ADDITIONAL TESTS:  Reviewed TRANSFER FROM MICU TO [facility]:    HOSPITAL COURSE:  52 y/o man with PMH significant for cirrhosis, DM with previously untreated ESBL UTI, presenting with rigors, AMS, fever and hemodynamic instability. Presented to [facility], found to have T 102.5, , BP 88/47->89/40, SpO2 97, WBC 10.47, Hb 9.2 (baseline), Plt 79, Cr 1.72, lactate 2.5. UA negative nitrites and positive leuk esterase. Previously admitted to [facility] on 22, urine grew ESBL, untreated. During this admission met sepsis criteria (tachycardia, febrile) In the ED, he was given acetaminophen and ibuprofen, ceftriaxone, zosyn, bactrim, NS 2.8L and started levophed. On arrival to the MICU, patient was stable and weaned off of levophed, continued on zosyn. Sepsis improved, good UOP, Cr 1.42, mental status improved, lactate cleared. On room air, tolerating PO intake. Added midrodine 5mg TID. Blood cultures positive for gram negative rods, susceptibilities to follow. Stable for transfer from MICU to [facility].    INTERVAL HPI/OVERNIGHT EVENTS:  Overnight given tylenol for fever and ondansetron for nausea. Patient was seen and examined at bedside. Feeling better and able to ambulate. Urinating frequently. ROS otherwise negative.    VITAL SIGNS:  T(F): 96.8 (09-10-22 @ 10:38)  HR: 95 (09-10-22 @ 11:00)  BP: 96/56 (09-10-22 @ 11:00)  RR: 15 (09-10-22 @ 11:00)  SpO2: 96% (09-10-22 @ 11:00)  Wt(kg): --      22 @ 07:01  -  09-10-22 @ 07:00  --------------------------------------------------------  IN: 2400 mL / OUT: 530 mL / NET: 1870 mL    09-10-22 @ 07:01  -  09-10-22 @ 11:38  --------------------------------------------------------  IN: 0 mL / OUT: 300 mL / NET: -300 mL        PHYSICAL EXAM:    Constitutional: resting comfortably in bed; NAD  HEENT: NC/AT, PER, anicteric sclera, no nasal discharge; MMM; neck supple  Respiratory: CTA B/L; no W/R/R, no retractions  Cardiac: +S1/S2; RRR; no M/R/G  Gastrointestinal: soft, NT/ND; no rebound or guarding  Extremities: WWP, no clubbing or cyanosis; no peripheral edema  Musculoskeletal: NROM x4; no joint swelling, tenderness or erythema  Vascular: 2+ radial, DP/PT pulses B/L  Dermatologic: skin warm, dry and intact; no rashes, wounds, or scars  Neurologic: AAOx3; CNII-XII grossly intact; no focal deficits    MEDICATIONS  (STANDING):  chlorhexidine 2% Cloths 1 Application(s) Topical <User Schedule>  dextrose 5%. 1000 milliLiter(s) (50 mL/Hr) IV Continuous <Continuous>  dextrose 5%. 1000 milliLiter(s) (100 mL/Hr) IV Continuous <Continuous>  dextrose 50% Injectable 25 Gram(s) IV Push once  dextrose 50% Injectable 12.5 Gram(s) IV Push once  dextrose 50% Injectable 25 Gram(s) IV Push once  folic acid 1 milliGRAM(s) Oral daily  glucagon  Injectable 1 milliGRAM(s) IntraMuscular once  insulin lispro (ADMELOG) corrective regimen sliding scale   SubCutaneous Before meals and at bedtime  lactulose Syrup 20 Gram(s) Oral every 12 hours  midodrine. 5 milliGRAM(s) Oral three times a day  multivitamin/minerals 1 Tablet(s) Oral daily  oxyCODONE    IR 10 milliGRAM(s) Oral three times a day  pantoprazole    Tablet 40 milliGRAM(s) Oral before breakfast  piperacillin/tazobactam IVPB.. 3.375 Gram(s) IV Intermittent every 6 hours  thiamine 100 milliGRAM(s) Oral daily    MEDICATIONS  (PRN):  dextrose Oral Gel 15 Gram(s) Oral once PRN Blood Glucose LESS THAN 70 milliGRAM(s)/deciliter      Allergies    No Known Allergies    Intolerances        LABS:                        8.4    8.16  )-----------( 74       ( 10 Sep 2022 04:04 )             25.2     09-10    130<L>  |  96  |  34<H>  ----------------------------<  153<H>  3.6   |  22  |  1.42<H>    Ca    8.7      10 Sep 2022 04:04  Phos  3.3     09-10  Mg     1.8     -10    TPro  6.9  /  Alb  3.4  /  TBili  1.0  /  DBili  x   /  AST  21  /  ALT  11  /  AlkPhos  93  09-10    PT/INR - ( 09 Sep 2022 15:15 )   PT: 19.5 sec;   INR: 1.67          PTT - ( 09 Sep 2022 15:15 )  PTT:30.6 sec  Urinalysis Basic - ( 09 Sep 2022 15:15 )    Color: Yellow / Appearance: Clear / S.010 / pH: x  Gluc: x / Ketone: NEGATIVE  / Bili: NEGATIVE / Urobili: 1.0 E.U./dL   Blood: x / Protein: Trace mg/dL / Nitrite: NEGATIVE   Leuk Esterase: Small / RBC: < 5 /HPF / WBC Many /HPF   Sq Epi: x / Non Sq Epi: 5-10 /HPF / Bacteria: Present /HPF        RADIOLOGY & ADDITIONAL TESTS:  Reviewed

## 2022-09-10 NOTE — PROGRESS NOTE ADULT - ATTENDING COMMENTS
52 y/o male w/ hx of cirrhosis, alcohol abuse, DM who was recently admitted to [facility] for ESBL UTI who is readmitted to [facility] for septic shock due to ESBL E. Coli bacteremia from complicated UTI. Patient was initially being treated with zosyn but escalated to ertapenem given sensitivities. He has been weaned from levophed and transitioned to midodrine 15 mg q 8. Patient appears to be warm and well perfused with adequate mentation, good urine output, normal lactate and no evidence of new end organ damage. Transferred to [facility] for ongoing tx of compliated UTI, ESBL bacteremia, ATN 2/2 septic shock.     He will require PICC and dispo planning as he improves. From shelter so will require social work input. PT mercyal to assist with dispo planning. 52 y/o male w/ hx of cirrhosis, alcohol abuse, DM who was recently admitted to [facility] for ESBL UTI who is readmitted to [facility] MICU for septic shock due to ESBL E. Coli bacteremia from complicated UTI. Patient was initially being treated with zosyn but escalated to ertapenem given sensitivities. He has been weaned from levophed and transitioned to midodrine 15 mg q 8. Patient appears to be warm and well perfused with adequate mentation, good urine output, normal lactate and no evidence of new end organ damage. Transferred to [facility] for ongoing tx of compliated UTI, ESBL bacteremia, ATN 2/2 septic shock.     He will require PICC and dispo planning as he improves. From shelter so will require social work input. PT mercyal to assist with dispo planning. 52 y/o male w/ hx of cirrhosis, alcohol abuse, DM who was recently admitted to [facility] for ESBL UTI who is readmitted to [facility]U for septic shock due to ESBL E. Coli bacteremia from complicated UTI. Patient was initially being treated with zosyn but escalated to ertapenem given sensitivities. He has been weaned from levophed and transitioned to midodrine 15 mg q 8. Patient appears to be warm and well perfused with adequate mentation, good urine output, normal lactate and no evidence of new end organ damage. Transferred to [facility] for ongoing tx of compliated UTI, ESBL bacteremia, ATN 2/2 septic shock.     He will require PICC and dispo planning as he improves. From shelter so will require social work input. PT mercyal to assist with dispo planning.

## 2022-09-10 NOTE — PROCEDURE NOTE - NSUSCPTCODES_ED_ALL
[id number] Abdominal Ultrasound (GB/FAST) [id number] Abdominal Ultrasound (GB/FAST) [id number] Abdominal Ultrasound (GB/FAST)

## 2022-09-10 NOTE — CHART NOTE - NSCHARTNOTEFT_GEN_A_CORE
Infectious Diseases Anti-infective Approval Note    Medication: ertapenem  Dose*: 1g  Route: IV  Frequency: q24h  Duration**: 7d    *Dose may be adjusted as needed for alterations in renal function.    **Reflects duration of approval and not necessarily duration of therapy     THIS IS NOT AN INFECTIOUS DISEASES CONSULTATION

## 2022-09-10 NOTE — PROGRESS NOTE ADULT - ASSESSMENT
52 y/o man with PMH significant for cirrhosis, DM with previously untreated ESBL UTI, presenting with rigors, AMS, fever and hemodynamic instability with elevated WBC count likely due to multiorgan failure 2/2 UTI and bacteremia.     Neuro   #Metabolic encephalopathy   Multifactorial 2/2 cirrhosis/ammonia, septic shock, alcohol withdrawal and malnourished (hypomagnesemia, hypophosphatemia)   - F/u Alcohol level   - F/u Ammonia level   - Replete electrolytes   - CIWA protocol (Ativan for CIWA >8)  - Start multivitamin, thiamine and folate    Pulmonary  Maintain SpO2>94%   #TENZIN  - CPAP qhs    CVS   #Septic shock with multiorgan failure   likely source UTI (ESBL)   Patient meeting sepsis criteria (tachycardic, febrile)  He has low Bp, WBC of 10.74, lactate 2.5, elevated Cr (1.72), and elevated bili (1.5) and AST  Improving: Good UOP, Cr improved, bili improved, mental status is better, lactate cleared, euvolemic with good perfusion   - F/u urine and blood cultures   - C/w Zosyn   - Wean Levo as tolerated, MAP >65  - Patient tolerating PO no need for fluids at this time    GI   #PUD   - Start pantoprazole   - regular diet     #Cirrhosis   - C/w lactulose q8  - Abdominal US     Endo   #DM  A1C @6 on Sep 5th   Pt not on any meds at home  ISS     Renal  #LINNETTE   Cr elevated to 1.72 and improving to 1.42 after fluid resuscitation and levo  - F/u urine lytes   - Trend Cr     ID  #bacteremia  Blood cultures positive for gram neg rods. Susceptibilities to follow.  - f/u susceptibilities  - continue zosyn    #ESBL UTI  - F/u urine and blood cultures   - C/w Zosyn     Heme   #Anemia   Hx of alcoholism, cirrhosis, malnourished   - F/u Iron studies   - c/w multivitamin, folic acid     F: tolerating PO, ondansetron for Nausea (PRN) QTc was 450   E: Replete PRN   DVT propylaxis: SCDs  GI prophylaxis: Pantoprazole      50 y/o man with PMH significant for cirrhosis, DM with previously untreated ESBL UTI, presenting with rigors, AMS, fever and hemodynamic instability with elevated WBC count likely due to multiorgan failure 2/2 UTI and bacteremia.     Neuro   #Metabolic encephalopathy   Multifactorial 2/2 cirrhosis/ammonia, septic shock, alcohol withdrawal and malnourished (hypomagnesemia, hypophosphatemia)   - F/u Alcohol level   - F/u Ammonia level   - Replete electrolytes   - CIWA protocol (Ativan for CIWA >8)  - Start multivitamin, thiamine and folate    Pulmonary  Maintain SpO2>94%   #TENZIN  - CPAP qhs    CVS   #Septic shock with multiorgan failure   likely source UTI (ESBL)   Patient meeting sepsis criteria (tachycardic, febrile)  He has low Bp, WBC of 10.74, lactate 2.5, elevated Cr (1.72), and elevated bili (1.5) and AST  Improving: Good UOP, Cr improved, bili improved, mental status is better, lactate cleared, euvolemic with good perfusion   - F/u urine and blood cultures   - C/w Zosyn   - Wean Levo as tolerated, MAP >65  - Patient tolerating PO no need for fluids at this time    GI   #PUD   - Start pantoprazole   - regular diet     #Cirrhosis   - C/w lactulose q8  - Abdominal US     Endo   #DM  A1C @6 on Sep 5th   Pt not on any meds at home  ISS     Renal  #LINNETTE   Cr elevated to 1.72 and improving to 1.42 after fluid resuscitation and levo  - F/u urine lytes   - Trend Cr     ID  #bacteremia  Blood cultures positive for gram neg rods. Susceptibilities to follow.  - f/u susceptibilities  - continue zosyn    #ESBL UTI  - F/u urine and blood cultures   - C/w Zosyn     Heme   #Anemia   Hx of alcoholism, cirrhosis, malnourished   - F/u Iron studies   - c/w multivitamin, folic acid     F: tolerating PO, ondansetron for Nausea (PRN) QTc was 450   E: Replete PRN   DVT propylaxis: SCDs  GI prophylaxis: Pantoprazole      52 y/o man with PMH significant for cirrhosis, DM with previously untreated ESBL UTI, presenting with rigors, AMS, fever and hemodynamic instability with elevated WBC count likely due to multiorgan failure 2/2 UTI and bacteremia.     Neuro   #Metabolic encephalopathy   Multifactorial 2/2 cirrhosis/ammonia, septic shock, alcohol withdrawal and malnourished (hypomagnesemia, hypophosphatemia). Blood alcohol level <3.  - F/u Ammonia level   - Replete electrolytes   - CIWA protocol (Ativan for CIWA >8)  - Start multivitamin, thiamine and folate    Pulmonary  Maintain SpO2>94%   #TENZIN  - CPAP qhs    CVS   #Septic shock with multiorgan failure   Likely source UTI (ESBL)  Patient meeting sepsis criteria (tachycardic, febrile)  He has low Bp, WBC of 10.74, lactate 2.5, elevated Cr (1.72), and elevated bili (1.5) and AST  Improving: Good UOP, Cr improved, bili improved, mental status is better, lactate cleared, euvolemic with good perfusion. Weaned off of levo.  Blood cultures positive for gram negative rods in all 4 bottles.  - F/u urine cultures  - f/u blood susceptibilities  - C/w Zosyn   - Patient tolerating PO no need for fluids at this time    GI   #PUD   - Start pantoprazole   - regular diet     #Cirrhosis   - C/w lactulose q8  - Abdominal US     Endo   #DM  A1C @6 on Sep 5th   Pt not on any meds at home  ISS     Renal  #LINNETTE   Cr elevated to 1.72 and improving to 1.42 after fluid resuscitation and levo  - F/u urine lytes   - Trend Cr     ID  #bacteremia  Blood cultures positive for gram neg rods. Susceptibilities to follow.  - f/u susceptibilities  - continue zosyn    #ESBL UTI  - F/u urine and blood cultures   - C/w Zosyn     Heme   #Anemia   Hx of alcoholism, cirrhosis, malnourished   - F/u Iron studies   - c/w multivitamin, folic acid     F: tolerating PO, ondansetron for Nausea (PRN) QTc was 450   E: Replete PRN   DVT propylaxis: SCDs  GI prophylaxis: Pantoprazole      50 y/o man with PMH significant for cirrhosis, DM with previously untreated ESBL UTI, presenting with rigors, AMS, fever and hemodynamic instability with elevated WBC count likely due to multiorgan failure 2/2 UTI and bacteremia.     Neuro   #Metabolic encephalopathy   Multifactorial 2/2 cirrhosis/ammonia, septic shock, alcohol withdrawal and malnourished (hypomagnesemia, hypophosphatemia). Blood alcohol level <3.  - F/u Ammonia level   - Replete electrolytes   - CIWA protocol (Ativan for CIWA >8)  - Start multivitamin, thiamine and folate    Pulmonary  Maintain SpO2>94%   #TENZIN  - CPAP qhs    CVS   #Septic shock with multiorgan failure   Likely source UTI (ESBL)  Patient meeting sepsis criteria (tachycardic, febrile)  He has low Bp, WBC of 10.74, lactate 2.5, elevated Cr (1.72), and elevated bili (1.5) and AST  Improving: Good UOP, Cr improved, bili improved, mental status is better, lactate cleared, euvolemic with good perfusion. Weaned off of levo.  Blood cultures positive for gram negative rods in all 4 bottles.  - F/u urine cultures  - f/u blood susceptibilities  - C/w Zosyn   - Patient tolerating PO no need for fluids at this time    GI   #PUD   - Start pantoprazole   - regular diet     #Cirrhosis   - C/w lactulose q8  - Abdominal US     Endo   #DM  A1C @6 on Sep 5th   Pt not on any meds at home  ISS     Renal  #LINNETTE   Cr elevated to 1.72 and improving to 1.42 after fluid resuscitation and levo  - F/u urine lytes   - Trend Cr     ID  #bacteremia  Blood cultures positive for gram neg rods. Susceptibilities to follow.  - f/u susceptibilities  - continue zosyn    #ESBL UTI  - F/u urine and blood cultures   - C/w Zosyn     Heme   #Anemia   Hx of alcoholism, cirrhosis, malnourished   - F/u Iron studies   - c/w multivitamin, folic acid     F: tolerating PO, ondansetron for Nausea (PRN) QTc was 450   E: Replete PRN   DVT propylaxis: SCDs  GI prophylaxis: Pantoprazole

## 2022-09-10 NOTE — PROGRESS NOTE ADULT - SUBJECTIVE AND OBJECTIVE BOX
***Transfer acceptance from  to ***    HOSPITAL COURSE:  50 y/o man with PMH significant for cirrhosis, DM with previously untreated ESBL UTI, presenting with rigors, AMS, fever and hemodynamic instability. Presented to [facility], found to have T 102.5, , BP 88/47->89/40, SpO2 97, WBC 10.47, Hb 9.2 (baseline), Plt 79, Cr 1.72, lactate 2.5. UA negative nitrites and positive leuk esterase. Previously admitted to [facility] on 22, urine grew ESBL, untreated. During this admission met sepsis criteria (tachycardia, febrile) In the ED, he was given acetaminophen and ibuprofen, ceftriaxone, zosyn, bactrim, NS 2.8L and started levophed. On arrival to the MICU, patient was stable and weaned off of levophed, continued on zosyn. Sepsis improved, good UOP, Cr 1.42, mental status improved, lactate cleared. On room air, tolerating PO intake. Added midrodine 5mg TID. Blood cultures positive for gram negative rods, susceptibilities to follow. Stable for transfer from MICU to [facility].      SUBJECTIVE:  Patient seen and examined at bedside, NAD, wanted to urinate during the conversation.      Vital Signs Last 12 Hrs  T(F): 99.5 (09-10-22 @ 14:00), Max: 100.1 (09-10-22 @ 04:00)  HR: 95 (09-10-22 @ 14:00) (90 - 129)  BP: 100/58 (09-10-22 @ 15:00) (85/52 - 118/61)  BP(mean): 76 (09-10-22 @ 15:00) (63 - 81)  RR: 15 (09-10-22 @ 14:00) (10 - 21)  SpO2: 88% (09-10-22 @ 15:00) (88% - 100%)  I&O's Summary    09 Sep 2022 07:01  -  10 Sep 2022 07:00  --------------------------------------------------------  IN: 2400 mL / OUT: 530 mL / NET: 1870 mL    10 Sep 2022 07:01  -  10 Sep 2022 15:08  --------------------------------------------------------  IN: 0 mL / OUT: 300 mL / NET: -300 mL        PHYSICAL EXAM:  Constitutional: resting comfortably in bed; NAD  HEENT: NC/AT, PER, anicteric sclera, no nasal discharge; MMM; neck supple  Respiratory: CTA B/L; no W/R/R, no retractions  Cardiac: +S1/S2; RRR; no M/R/G  Gastrointestinal: soft, NT/ND; no rebound or guarding  Extremities: WWP, no clubbing or cyanosis; no peripheral edema  Musculoskeletal: NROM x4; no joint swelling, tenderness or erythema  Vascular: 2+ radial, DP/PT pulses B/L  Dermatologic: skin warm, dry and intact; no rashes, wounds, or scars  Neurologic: AAOx3; CNII-XII grossly intact; no focal deficits          LABS:                        8.4    8.16  )-----------( 74       ( 10 Sep 2022 04:04 )             25.2     09-10    130<L>  |  96  |  34<H>  ----------------------------<  153<H>  3.6   |  22  |  1.42<H>    Ca    8.7      10 Sep 2022 04:04  Phos  3.3     09-10  Mg     1.8     09-10    TPro  6.9  /  Alb  3.4  /  TBili  1.0  /  DBili  x   /  AST  21  /  ALT  11  /  AlkPhos  93  09-10    PT/INR - ( 09 Sep 2022 15:15 )   PT: 19.5 sec;   INR: 1.67          PTT - ( 09 Sep 2022 15:15 )  PTT:30.6 sec  Urinalysis Basic - ( 09 Sep 2022 15:15 )    Color: Yellow / Appearance: Clear / S.010 / pH: x  Gluc: x / Ketone: NEGATIVE  / Bili: NEGATIVE / Urobili: 1.0 E.U./dL   Blood: x / Protein: Trace mg/dL / Nitrite: NEGATIVE   Leuk Esterase: Small / RBC: < 5 /HPF / WBC Many /HPF   Sq Epi: x / Non Sq Epi: 5-10 /HPF / Bacteria: Present /HPF          RADIOLOGY & ADDITIONAL TESTS:    MEDICATIONS  (STANDING):  chlorhexidine 2% Cloths 1 Application(s) Topical <User Schedule>  dextrose 5%. 1000 milliLiter(s) (50 mL/Hr) IV Continuous <Continuous>  dextrose 5%. 1000 milliLiter(s) (100 mL/Hr) IV Continuous <Continuous>  dextrose 50% Injectable 25 Gram(s) IV Push once  dextrose 50% Injectable 12.5 Gram(s) IV Push once  dextrose 50% Injectable 25 Gram(s) IV Push once  ertapenem  IVPB 1000 milliGRAM(s) IV Intermittent every 24 hours  folic acid 1 milliGRAM(s) Oral daily  glucagon  Injectable 1 milliGRAM(s) IntraMuscular once  insulin lispro (ADMELOG) corrective regimen sliding scale   SubCutaneous Before meals and at bedtime  lactulose Syrup 20 Gram(s) Oral every 12 hours  midodrine. 15 milliGRAM(s) Oral three times a day  multivitamin/minerals 1 Tablet(s) Oral daily  nicotine -  14 mG/24Hr(s) Patch 1 Patch Transdermal daily  oxyCODONE    IR 10 milliGRAM(s) Oral three times a day  pantoprazole    Tablet 40 milliGRAM(s) Oral before breakfast  thiamine 100 milliGRAM(s) Oral daily    MEDICATIONS  (PRN):  dextrose Oral Gel 15 Gram(s) Oral once PRN Blood Glucose LESS THAN 70 milliGRAM(s)/deciliter     ***Transfer acceptance from  to ***    HOSPITAL COURSE:  50 y/o man with PMH significant for cirrhosis, DM with previously untreated ESBL UTI, presenting with rigors, AMS, fever and hemodynamic instability. Presented to [facility], found to have T 102.5, , BP 88/47->89/40, SpO2 97, WBC 10.47, Hb 9.2 (baseline), Plt 79, Cr 1.72, lactate 2.5. UA negative nitrites and positive leuk esterase. Previously admitted to [facility] on 22, urine grew ESBL, untreated. During this admission met sepsis criteria (tachycardia, febrile) In the ED, he was given acetaminophen and ibuprofen, ceftriaxone, zosyn, bactrim, NS 2.8L and started levophed. On arrival to the MICU, patient was stable and weaned off of levophed, continued on zosyn. Sepsis improved, good UOP, Cr 1.42, mental status improved, lactate cleared. On room air, tolerating PO intake. Added midrodine 5mg TID. Blood cultures positive for gram negative rods, susceptibilities to follow. Stable for transfer from MICU to [facility].      SUBJECTIVE:  Patient seen and examined at bedside, NAD, wanted to urinate during the conversation.      Vital Signs Last 12 Hrs  T(F): 99.5 (09-10-22 @ 14:00), Max: 100.1 (09-10-22 @ 04:00)  HR: 95 (09-10-22 @ 14:00) (90 - 129)  BP: 100/58 (09-10-22 @ 15:00) (85/52 - 118/61)  BP(mean): 76 (09-10-22 @ 15:00) (63 - 81)  RR: 15 (09-10-22 @ 14:00) (10 - 21)  SpO2: 88% (09-10-22 @ 15:00) (88% - 100%)  I&O's Summary    09 Sep 2022 07:01  -  10 Sep 2022 07:00  --------------------------------------------------------  IN: 2400 mL / OUT: 530 mL / NET: 1870 mL    10 Sep 2022 07:01  -  10 Sep 2022 15:08  --------------------------------------------------------  IN: 0 mL / OUT: 300 mL / NET: -300 mL        PHYSICAL EXAM:  Constitutional: resting comfortably in bed; NAD  HEENT: NC/AT, PER, anicteric sclera, no nasal discharge; MMM; neck supple  Respiratory: CTA B/L; no W/R/R, no retractions  Cardiac: +S1/S2; RRR; no M/R/G  Gastrointestinal: soft, NT/ND; no rebound or guarding  Extremities: WWP, no clubbing or cyanosis; no peripheral edema  Musculoskeletal: NROM x4; no joint swelling, tenderness or erythema  Vascular: 2+ radial, DP/PT pulses B/L  Dermatologic: skin warm, dry and intact; no rashes, wounds, or scars  Neurologic: AAOx3; CNII-XII grossly intact; no focal deficits          LABS:                        8.4    8.16  )-----------( 74       ( 10 Sep 2022 04:04 )             25.2     09-10    130<L>  |  96  |  34<H>  ----------------------------<  153<H>  3.6   |  22  |  1.42<H>    Ca    8.7      10 Sep 2022 04:04  Phos  3.3     09-10  Mg     1.8     09-10    TPro  6.9  /  Alb  3.4  /  TBili  1.0  /  DBili  x   /  AST  21  /  ALT  11  /  AlkPhos  93  09-10    PT/INR - ( 09 Sep 2022 15:15 )   PT: 19.5 sec;   INR: 1.67          PTT - ( 09 Sep 2022 15:15 )  PTT:30.6 sec  Urinalysis Basic - ( 09 Sep 2022 15:15 )    Color: Yellow / Appearance: Clear / S.010 / pH: x  Gluc: x / Ketone: NEGATIVE  / Bili: NEGATIVE / Urobili: 1.0 E.U./dL   Blood: x / Protein: Trace mg/dL / Nitrite: NEGATIVE   Leuk Esterase: Small / RBC: < 5 /HPF / WBC Many /HPF   Sq Epi: x / Non Sq Epi: 5-10 /HPF / Bacteria: Present /HPF          RADIOLOGY & ADDITIONAL TESTS:    MEDICATIONS  (STANDING):  chlorhexidine 2% Cloths 1 Application(s) Topical <User Schedule>  dextrose 5%. 1000 milliLiter(s) (50 mL/Hr) IV Continuous <Continuous>  dextrose 5%. 1000 milliLiter(s) (100 mL/Hr) IV Continuous <Continuous>  dextrose 50% Injectable 25 Gram(s) IV Push once  dextrose 50% Injectable 12.5 Gram(s) IV Push once  dextrose 50% Injectable 25 Gram(s) IV Push once  ertapenem  IVPB 1000 milliGRAM(s) IV Intermittent every 24 hours  folic acid 1 milliGRAM(s) Oral daily  glucagon  Injectable 1 milliGRAM(s) IntraMuscular once  insulin lispro (ADMELOG) corrective regimen sliding scale   SubCutaneous Before meals and at bedtime  lactulose Syrup 20 Gram(s) Oral every 12 hours  midodrine. 15 milliGRAM(s) Oral three times a day  multivitamin/minerals 1 Tablet(s) Oral daily  nicotine -  14 mG/24Hr(s) Patch 1 Patch Transdermal daily  oxyCODONE    IR 10 milliGRAM(s) Oral three times a day  pantoprazole    Tablet 40 milliGRAM(s) Oral before breakfast  thiamine 100 milliGRAM(s) Oral daily    MEDICATIONS  (PRN):  dextrose Oral Gel 15 Gram(s) Oral once PRN Blood Glucose LESS THAN 70 milliGRAM(s)/deciliter     ***Transfer acceptance from  to ***    HOSPITAL COURSE:  50 y/o man with PMH significant for cirrhosis, DM with previously untreated ESBL UTI, presenting with rigors, AMS, fever and hemodynamic instability. Presented to [facility], found to have T 102.5, , BP 88/47->89/40, SpO2 97, WBC 10.47, Hb 9.2 (baseline), Plt 79, Cr 1.72, lactate 2.5. UA negative nitrites and positive leuk esterase. Previously admitted to [facility] on 22, urine grew ESBL, untreated. During this admission met sepsis criteria (tachycardia, febrile) In the ED, he was given acetaminophen and ibuprofen, ceftriaxone, zosyn, bactrim, NS 2.8L and started levophed. On arrival to the MICU, patient was stable and weaned off of levophed, continued on zosyn. Sepsis improved, good UOP, Cr 1.42, mental status improved, lactate cleared. On room air, tolerating PO intake. Added midrodine 5mg TID. Blood cultures positive for gram negative rods, susceptibilities to follow. Stable for transfer from MICU to [facility].      SUBJECTIVE:  Patient seen and examined at bedside, NAD, wanted to urinate during the conversation.      Vital Signs Last 12 Hrs  T(F): 99.5 (09-10-22 @ 14:00), Max: 100.1 (09-10-22 @ 04:00)  HR: 95 (09-10-22 @ 14:00) (90 - 129)  BP: 100/58 (09-10-22 @ 15:00) (85/52 - 118/61)  BP(mean): 76 (09-10-22 @ 15:00) (63 - 81)  RR: 15 (09-10-22 @ 14:00) (10 - 21)  SpO2: 88% (09-10-22 @ 15:00) (88% - 100%)  I&O's Summary    09 Sep 2022 07:01  -  10 Sep 2022 07:00  --------------------------------------------------------  IN: 2400 mL / OUT: 530 mL / NET: 1870 mL    10 Sep 2022 07:01  -  10 Sep 2022 15:08  --------------------------------------------------------  IN: 0 mL / OUT: 300 mL / NET: -300 mL        PHYSICAL EXAM:  Constitutional: resting comfortably in bed; NAD  HEENT: NC/AT, PER, anicteric sclera, no nasal discharge; MMM; neck supple  Respiratory: CTA B/L; no W/R/R, no retractions  Cardiac: +S1/S2; RRR; no M/R/G  Gastrointestinal: soft, NT/ND; no rebound or guarding  Extremities: WWP, no clubbing or cyanosis; no peripheral edema  Musculoskeletal: NROM x4; no joint swelling, tenderness or erythema  Vascular: 2+ radial, DP/PT pulses B/L  Dermatologic: skin warm, dry and intact; no rashes, wounds, or scars  Neurologic: AAOx3; CNII-XII grossly intact; no focal deficits          LABS:                        8.4    8.16  )-----------( 74       ( 10 Sep 2022 04:04 )             25.2     09-10    130<L>  |  96  |  34<H>  ----------------------------<  153<H>  3.6   |  22  |  1.42<H>    Ca    8.7      10 Sep 2022 04:04  Phos  3.3     09-10  Mg     1.8     09-10    TPro  6.9  /  Alb  3.4  /  TBili  1.0  /  DBili  x   /  AST  21  /  ALT  11  /  AlkPhos  93  09-10    PT/INR - ( 09 Sep 2022 15:15 )   PT: 19.5 sec;   INR: 1.67          PTT - ( 09 Sep 2022 15:15 )  PTT:30.6 sec  Urinalysis Basic - ( 09 Sep 2022 15:15 )    Color: Yellow / Appearance: Clear / S.010 / pH: x  Gluc: x / Ketone: NEGATIVE  / Bili: NEGATIVE / Urobili: 1.0 E.U./dL   Blood: x / Protein: Trace mg/dL / Nitrite: NEGATIVE   Leuk Esterase: Small / RBC: < 5 /HPF / WBC Many /HPF   Sq Epi: x / Non Sq Epi: 5-10 /HPF / Bacteria: Present /HPF          RADIOLOGY & ADDITIONAL TESTS:    MEDICATIONS  (STANDING):  chlorhexidine 2% Cloths 1 Application(s) Topical <User Schedule>  dextrose 5%. 1000 milliLiter(s) (50 mL/Hr) IV Continuous <Continuous>  dextrose 5%. 1000 milliLiter(s) (100 mL/Hr) IV Continuous <Continuous>  dextrose 50% Injectable 25 Gram(s) IV Push once  dextrose 50% Injectable 12.5 Gram(s) IV Push once  dextrose 50% Injectable 25 Gram(s) IV Push once  ertapenem  IVPB 1000 milliGRAM(s) IV Intermittent every 24 hours  folic acid 1 milliGRAM(s) Oral daily  glucagon  Injectable 1 milliGRAM(s) IntraMuscular once  insulin lispro (ADMELOG) corrective regimen sliding scale   SubCutaneous Before meals and at bedtime  lactulose Syrup 20 Gram(s) Oral every 12 hours  midodrine. 15 milliGRAM(s) Oral three times a day  multivitamin/minerals 1 Tablet(s) Oral daily  nicotine -  14 mG/24Hr(s) Patch 1 Patch Transdermal daily  oxyCODONE    IR 10 milliGRAM(s) Oral three times a day  pantoprazole    Tablet 40 milliGRAM(s) Oral before breakfast  thiamine 100 milliGRAM(s) Oral daily    MEDICATIONS  (PRN):  dextrose Oral Gel 15 Gram(s) Oral once PRN Blood Glucose LESS THAN 70 milliGRAM(s)/deciliter

## 2022-09-10 NOTE — PROCEDURE NOTE - NSUSCPTCODES_ED_ALL
[id number] Retroperitioneal Abdominal (Aorta/Renal) [id number] Retroperitioneal Abdominal (Aorta/Renal) [id number] Retroperitioneal Abdominal (Aorta/Renal)

## 2022-09-10 NOTE — PROCEDURE NOTE - NSPROCNAME_GEN_A_CORE
Point of Care Ultrasound Renal
Point of Care Ultrasound RUQ
Point of Care Ultrasound Cardiac
Point of Care Ultrasound Lung

## 2022-09-11 LAB
BILIRUB SERPL-MCNC: 1 MG/DL — SIGNIFICANT CHANGE UP (ref 0.2–1.2)
CREAT SERPL-MCNC: 1.22 MG/DL — SIGNIFICANT CHANGE UP (ref 0.5–1.3)
CULTURE RESULTS: SIGNIFICANT CHANGE UP
EGFR: 71 ML/MIN/1.73M2 — SIGNIFICANT CHANGE UP
GLUCOSE BLDC GLUCOMTR-MCNC: 139 MG/DL — HIGH (ref 70–99)
GLUCOSE BLDC GLUCOMTR-MCNC: 156 MG/DL — HIGH (ref 70–99)
GLUCOSE BLDC GLUCOMTR-MCNC: 199 MG/DL — HIGH (ref 70–99)
INR BLD: 1.41 — HIGH (ref 0.88–1.16)
MELD SCORE WITH DIALYSIS: 29 POINTS — SIGNIFICANT CHANGE UP
MELD SCORE WITHOUT DIALYSIS: 22 POINTS — SIGNIFICANT CHANGE UP
PROTHROM AB SERPL-ACNC: 16.8 SEC — HIGH (ref 10.5–13.4)
SODIUM SERPL-SCNC: 126 MMOL/L — LOW (ref 135–145)
SPECIMEN SOURCE: SIGNIFICANT CHANGE UP

## 2022-09-11 PROCEDURE — 99232 SBSQ HOSP IP/OBS MODERATE 35: CPT

## 2022-09-11 RX ADMIN — MIDODRINE HYDROCHLORIDE 15 MILLIGRAM(S): 2.5 TABLET ORAL at 11:34

## 2022-09-11 RX ADMIN — Medication 1 PATCH: at 11:33

## 2022-09-11 RX ADMIN — Medication 1 PATCH: at 16:36

## 2022-09-11 RX ADMIN — LACTULOSE 20 GRAM(S): 10 SOLUTION ORAL at 18:01

## 2022-09-11 RX ADMIN — LACTULOSE 20 GRAM(S): 10 SOLUTION ORAL at 06:22

## 2022-09-11 RX ADMIN — MIDODRINE HYDROCHLORIDE 15 MILLIGRAM(S): 2.5 TABLET ORAL at 06:23

## 2022-09-11 RX ADMIN — Medication 1 PATCH: at 07:19

## 2022-09-11 RX ADMIN — OXYCODONE HYDROCHLORIDE 10 MILLIGRAM(S): 5 TABLET ORAL at 14:48

## 2022-09-11 RX ADMIN — OXYCODONE HYDROCHLORIDE 10 MILLIGRAM(S): 5 TABLET ORAL at 23:00

## 2022-09-11 RX ADMIN — Medication 100 MILLIGRAM(S): at 11:34

## 2022-09-11 RX ADMIN — OXYCODONE HYDROCHLORIDE 10 MILLIGRAM(S): 5 TABLET ORAL at 22:03

## 2022-09-11 RX ADMIN — Medication 2: at 13:14

## 2022-09-11 RX ADMIN — ONDANSETRON 4 MILLIGRAM(S): 8 TABLET, FILM COATED ORAL at 06:24

## 2022-09-11 RX ADMIN — PANTOPRAZOLE SODIUM 40 MILLIGRAM(S): 20 TABLET, DELAYED RELEASE ORAL at 06:24

## 2022-09-11 RX ADMIN — MIDODRINE HYDROCHLORIDE 15 MILLIGRAM(S): 2.5 TABLET ORAL at 18:00

## 2022-09-11 RX ADMIN — Medication 1 TABLET(S): at 11:34

## 2022-09-11 RX ADMIN — Medication 1 MILLIGRAM(S): at 11:34

## 2022-09-11 RX ADMIN — Medication 1 PATCH: at 18:34

## 2022-09-11 RX ADMIN — ERTAPENEM SODIUM 120 MILLIGRAM(S): 1 INJECTION, POWDER, LYOPHILIZED, FOR SOLUTION INTRAMUSCULAR; INTRAVENOUS at 13:40

## 2022-09-11 RX ADMIN — OXYCODONE HYDROCHLORIDE 10 MILLIGRAM(S): 5 TABLET ORAL at 06:23

## 2022-09-11 RX ADMIN — Medication 2: at 23:11

## 2022-09-11 RX ADMIN — OXYCODONE HYDROCHLORIDE 10 MILLIGRAM(S): 5 TABLET ORAL at 13:40

## 2022-09-11 RX ADMIN — OXYCODONE HYDROCHLORIDE 10 MILLIGRAM(S): 5 TABLET ORAL at 07:20

## 2022-09-11 NOTE — PROGRESS NOTE ADULT - PROBLEM SELECTOR PLAN 4
- C/w lactulose q8  - Abdominal US  - holding diuretics given sepsis and midodrine, cautious resumption likely tomorrow

## 2022-09-11 NOTE — PROVIDER CONTACT NOTE (OTHER) - SITUATION
Provider made aware that patient is complaining of nausea and is tachycardiac. Provider also made aware that patient's PO intact remain poor, but that patient does not want food options that are given

## 2022-09-11 NOTE — DIETITIAN INITIAL EVALUATION ADULT - PERTINENT MEDS FT
MEDICATIONS  (STANDING):  chlorhexidine 2% Cloths 1 Application(s) Topical <User Schedule>  dextrose 5%. 1000 milliLiter(s) (50 mL/Hr) IV Continuous <Continuous>  dextrose 5%. 1000 milliLiter(s) (100 mL/Hr) IV Continuous <Continuous>  dextrose 50% Injectable 25 Gram(s) IV Push once  dextrose 50% Injectable 12.5 Gram(s) IV Push once  dextrose 50% Injectable 25 Gram(s) IV Push once  ertapenem  IVPB 1000 milliGRAM(s) IV Intermittent every 24 hours  folic acid 1 milliGRAM(s) Oral daily  glucagon  Injectable 1 milliGRAM(s) IntraMuscular once  insulin lispro (ADMELOG) corrective regimen sliding scale   SubCutaneous Before meals and at bedtime  lactulose Syrup 20 Gram(s) Oral every 12 hours  midodrine. 15 milliGRAM(s) Oral three times a day  multivitamin/minerals 1 Tablet(s) Oral daily  nicotine -  14 mG/24Hr(s) Patch 1 Patch Transdermal daily  oxyCODONE    IR 10 milliGRAM(s) Oral three times a day  pantoprazole    Tablet 40 milliGRAM(s) Oral before breakfast  thiamine 100 milliGRAM(s) Oral daily    MEDICATIONS  (PRN):  dextrose Oral Gel 15 Gram(s) Oral once PRN Blood Glucose LESS THAN 70 milliGRAM(s)/deciliter

## 2022-09-11 NOTE — DIETITIAN INITIAL EVALUATION ADULT - PERTINENT LABORATORY DATA
09-11    126<L>  |  x   |  x   ----------------------------<  x   x    |  x   |  1.22    Ca    8.7      10 Sep 2022 04:04  Phos  3.3     09-10  Mg     1.8     09-10    TPro  x   /  Alb  x   /  TBili  1.0  /  DBili  x   /  AST  x   /  ALT  x   /  AlkPhos  x   09-11  POCT Blood Glucose.: 199 mg/dL (09-11-22 @ 12:37)  A1C with Estimated Average Glucose Result: 6.0 % (09-05-22 @ 06:03)

## 2022-09-11 NOTE — DIETITIAN INITIAL EVALUATION ADULT - OTHER CALCULATIONS
5'2'' IBW 118pounds +-10%  [id number] BMI36.6, %[id number]   IBW used to calculate energy needs due to pt's current body weight exceeding 120% of IBW  Adjust for age, Diallo-HX, cirrhosis, malnutrition; fluids per team based on sodium  5'2'' IBW 118pounds +-10%  [id number] BMI36.6, %[id number]   IBW used to calculate energy needs due to pt's current body weight exceeding 120% of IBW  Adjust for age, Diallo-HX, cirrhosis, malnutrition; fluids per team based on sodium  5'2'' IBW 118pounds +-10%  [id number] BMI36.6, %[id number]   IBW used to calculate energy needs due to pt's current body weight exceeding 120% of IBW  Adjust for age, Diallo-HX, cirrhosis, malnutrition; fluids per team based on sodium

## 2022-09-11 NOTE — DIETITIAN NUTRITION RISK NOTIFICATION - TREATMENT: THE FOLLOWING DIET HAS BEEN RECOMMENDED
PLAN/INTERVENTION:  - Obtain Current Weight	  - Obtain Weekly Weight	  - monitor BMP, CBC, glucose, lytes, trend renal indices, LFts, POCT, b12  - Continue Current Nutrition Care Plan  - Oral Nutrition Supplements: Ensure MAX prot x3/day 150kcal/30gm prot/2gm fiber/1gm sugar per 1 can    Additional Recommendations  1. Monitor PO intake/appetite, GI distress (Please optimize Med use for d/c), diet tolerance, labs, weights.  2. Honor pt food preferences as able.  3. MVI.  4. RD to remain available for additional nutrition interventions as needed.

## 2022-09-11 NOTE — PROGRESS NOTE ADULT - SUBJECTIVE AND OBJECTIVE BOX
INTERVAL HPI/OVERNIGHT EVENTS: AMY o/n. This AM patient reporting nausea, given zofran. Seen and examined at bedside, feels overall largely improved. Feels nausea and abdominal cramping due to lack of bowel movements but feels urge to go. Otherwise is tolerating PO but does not have significant appetite. Reports pain with his poor dentition. Expressing concerns about potentially returning to shelter. ROS otherwise negative.     VITAL SIGNS:  T(F): 99 (22 @ 12:38)  HR: 100 (22 @ 12:38)  BP: 111/69 (22 @ 12:38)  RR: 16 (22 @ 12:38)  SpO2: 100% (22 @ 12:38)  Wt(kg): --    PHYSICAL EXAM:      Constitutional: NAD, sitting at bedside   HEENT: PERRLA, EOMI, Normal Hearing, MMM  Neck: No LAD, No JVD  Chest: gynecomastia   Back: Normal spine flexure, No CVA tenderness  Respiratory: CTAB  Cardiovascular: S1 and S2, RRR, no M/G/R  Gastrointestinal: BS+, soft, mild distention. nontender. hernia - reducible   Extremities: No peripheral edema  Vascular: 2+ peripheral pulses  Neurological: A/O x 3, no focal deficits  Psychiatric: Normal mood, normal affect  Musculoskeletal: 5/5 strength b/l upper and lower extremities  Skin: No rashes        MEDICATIONS  (STANDING):  chlorhexidine 2% Cloths 1 Application(s) Topical <User Schedule>  dextrose 5%. 1000 milliLiter(s) (100 mL/Hr) IV Continuous <Continuous>  dextrose 5%. 1000 milliLiter(s) (50 mL/Hr) IV Continuous <Continuous>  dextrose 50% Injectable 25 Gram(s) IV Push once  dextrose 50% Injectable 12.5 Gram(s) IV Push once  dextrose 50% Injectable 25 Gram(s) IV Push once  ertapenem  IVPB 1000 milliGRAM(s) IV Intermittent every 24 hours  folic acid 1 milliGRAM(s) Oral daily  glucagon  Injectable 1 milliGRAM(s) IntraMuscular once  insulin lispro (ADMELOG) corrective regimen sliding scale   SubCutaneous Before meals and at bedtime  lactulose Syrup 20 Gram(s) Oral every 12 hours  midodrine. 15 milliGRAM(s) Oral three times a day  multivitamin/minerals 1 Tablet(s) Oral daily  nicotine -  14 mG/24Hr(s) Patch 1 Patch Transdermal daily  oxyCODONE    IR 10 milliGRAM(s) Oral three times a day  pantoprazole    Tablet 40 milliGRAM(s) Oral before breakfast  thiamine 100 milliGRAM(s) Oral daily    MEDICATIONS  (PRN):  dextrose Oral Gel 15 Gram(s) Oral once PRN Blood Glucose LESS THAN 70 milliGRAM(s)/deciliter      Allergies    No Known Allergies    Intolerances        LABS:                        8.4    8.16  )-----------( 74       ( 10 Sep 2022 04:04 )             25.2     09-11    126<L>  |  x   |  x   ----------------------------<  x   x    |  x   |  1.22    Ca    8.7      10 Sep 2022 04:04  Phos  3.3     09-10  Mg     1.8     09-10    TPro  x   /  Alb  x   /  TBili  1.0  /  DBili  x   /  AST  x   /  ALT  x   /  AlkPhos  x   -11    PT/INR - ( 11 Sep 2022 08:52 )   PT: 16.8 sec;   INR: 1.41          PTT - ( 09 Sep 2022 15:15 )  PTT:30.6 sec  Urinalysis Basic - ( 09 Sep 2022 15:15 )    Color: Yellow / Appearance: Clear / S.010 / pH: x  Gluc: x / Ketone: NEGATIVE  / Bili: NEGATIVE / Urobili: 1.0 E.U./dL   Blood: x / Protein: Trace mg/dL / Nitrite: NEGATIVE   Leuk Esterase: Small / RBC: < 5 /HPF / WBC Many /HPF   Sq Epi: x / Non Sq Epi: 5-10 /HPF / Bacteria: Present /HPF        RADIOLOGY & ADDITIONAL TESTS:

## 2022-09-11 NOTE — DIETITIAN INITIAL EVALUATION ADULT - PERSON TAUGHT/METHOD
Attempted to provide diet education, pt having a hard time staying focused: Encouraged PO intake during meals & reviewed importance. Spoke about prot foods and tips for GI distress./verbal instruction/patient instructed

## 2022-09-11 NOTE — DIETITIAN INITIAL EVALUATION ADULT - OTHER INFO
52 y/o man with PMH significant for cirrhosis, DM with previously untreated ESBL UTI, presenting with rigors, AMS/Metabolic encephalopathy, fever and hemodynamic instability with elevated WBC count likely due to multiorgan failure 2/2 UTI. Pt hypotensive to 88/47 for which he received 2.8l IVF o/a. Stable for transfer from MICU to [facility]. Now on7UR. CIWA 2. No pain.     Pt seen this afternoon on 7UR with RN at bedside. Lunch just arriving during RD visit. Pt with poor dentition and needs softer foods. Pt reports ongoing issues with PO intake PTA for sometime however etiology of issues is unclear. Pt reports he cannot eat at shelter for various reasons: they do not let him eat the way he wants/needs, he does not like the food, the food makes him sick/nauseous (per H&P no antiemetic order per OP med list), only can eat late, needs a mini fridge which the shelter will not give him unless he has a letter. While reason for decreased PO is unclear, assume pt is not consistently meeting ~75% EER. Pt reports he can only eat well during hospital admissions, of note pt is ordered for protonix which may provide some GI relief during meals and thus promote PO intake. Pt wants to eat better however seems to be unwilling to accept RD suggestions. [facility] admit 9/4/2022 wt of 190 pounds noted, now admitted at 199 pounds which would suggest wt up. Pt reports wt loss in setting of [place] SX in 2005 in which he reports to have weigh 500 pounds. Pt eating lunch thus NFPE deferred however per visual is noted with wasting/loss that is mild/moderate in nature to triceps, temples, shoulders, clavicles. Malnutrition note placed today. No pain. Trever 21, No edema or pressure ulcers.   Labs:  POCT 199 139 154, Na 126, glucose 153, BUN 34, Cr 1.22, LFTs WDL, Lactate WDL-elevated o/a, Mg Phos K WDL.   Please see below for nutritions recommendations.  52 y/o man with PMH significant for cirrhosis, DM with previously untreated ESBL UTI, presenting with rigors, AMS/Metabolic encephalopathy, fever and hemodynamic instability with elevated WBC count likely due to multiorgan failure 2/2 UTI. Pt hypotensive to 88/47 for which he received 2.8l IVF o/a. Stable for transfer from MICU to [facility]. Now on7UR. CIWA 2. No pain.     Pt seen this afternoon on 7UR with RN at bedside. Lunch just arriving during RD visit. Pt with poor dentition and needs softer foods. Pt reports ongoing issues with PO intake PTA for sometime however etiology of issues is unclear. Pt reports he cannot eat at shelter for various reasons: they do not let him eat the way he wants/needs, he does not like the food, the food makes him sick/nauseous (per H&P no antiemetic order per OP med list), only can eat late, needs a mini fridge which the shelter will not give him unless he has a letter. While reason for decreased PO is unclear, assume pt is not consistently meeting ~75% EER. Pt reports he can only eat well during hospital admissions, of note pt is ordered for protonix which may provide some GI relief during meals and thus promote PO intake. Pt wants to eat better however seems to be unwilling to accept RD suggestions. [facility] admit 9/4/2022 wt of 190 pounds noted, now admitted at 199 pounds which would suggest wt up. Pt reports wt loss in setting of [place] SX in 2005 in which he reports to have weigh 500 pounds. Pt eating lunch thus NFPE deferred however per visual is noted with wasting/loss that is mild/moderate in nature to triceps, temples, shoulders, clavicles. Malnutrition note placed today. No pain. Trever 21, No edema or pressure ulcers.   Labs:  POCT 199 139 154, Na 126, glucose 153, BUN 34, Cr 1.22, LFTs WDL, Lactate WDL-elevated o/a, Mg Phos K WDL.   Please see below for nutritions recommendations.  52 y/o man with PMH significant for cirrhosis, DM with previously untreated ESBL UTI, presenting with rigors, AMS/Metabolic encephalopathy, fever and hemodynamic instability with elevated WBC count likely due to multiorgan failure 2/2 UTI. Pt hypotensive to 88/47 for which he received 2.8l IVF o/a. Stable for transfer from MICU to [facility]. Now on7UR. CIWA 2. No pain.     Pt seen this afternoon on 7UR with RN at bedside. Lunch just arriving during RD visit. Pt with poor dentition and needs softer foods. Pt reports ongoing issues with PO intake PTA for sometime however etiology of issues is unclear. Pt reports he cannot eat at shelter for various reasons: they do not let him eat the way he wants/needs, he does not like the food, the food makes him sick/nauseous (per H&P no antiemetic order per OP med list), only can eat late, needs a mini fridge which the shelter will not give him unless he has a letter. While reason for decreased PO is unclear, assume pt is not consistently meeting ~75% EER. Pt reports he can only eat well during hospital admissions, of note pt is ordered for protonix which may provide some GI relief during meals and thus promote PO intake. Pt wants to eat better however seems to be unwilling to accept RD suggestions. [facility] admit 9/4/2022 wt of 190 pounds noted, now admitted at 199 pounds which would suggest wt up. Pt reports wt loss in setting of [place] SX in 2005 in which he reports to have weigh 500 pounds. Pt eating lunch thus NFPE deferred however per visual is noted with wasting/loss that is mild/moderate in nature to triceps, temples, shoulders, clavicles. Malnutrition note placed today. No pain. Trever 21, No edema or pressure ulcers.   Labs:  POCT 199 139 154, Na 126, glucose 153, BUN 34, Cr 1.22, LFTs WDL, Lactate WDL-elevated o/a, Mg Phos K WDL.   Please see below for nutritions recommendations.

## 2022-09-11 NOTE — DIETITIAN INITIAL EVALUATION ADULT - ADD RECOMMEND
1. Monitor PO intake/appetite, GI distress (Please optimize Med use for d/c), diet tolerance, labs, weights.  2. Honor pt food preferences as able.  3. MVI.  4. RD to remain available for additional nutrition interventions as needed.

## 2022-09-11 NOTE — PROGRESS NOTE ADULT - PROBLEM SELECTOR PLAN 2
resolved. due to ESBL E coli bacteremia from complicated UTI   - c/w ertapenem  - will require midline/PICC for abx administration   - sw/IR consults

## 2022-09-12 ENCOUNTER — TRANSCRIPTION ENCOUNTER (OUTPATIENT)
Age: 52
End: 2022-09-12

## 2022-09-12 DIAGNOSIS — R78.81 BACTEREMIA: ICD-10-CM

## 2022-09-12 LAB
-  AMIKACIN: SIGNIFICANT CHANGE UP
-  AMPICILLIN/SULBACTAM: SIGNIFICANT CHANGE UP
-  AMPICILLIN: SIGNIFICANT CHANGE UP
-  AZTREONAM: SIGNIFICANT CHANGE UP
-  CEFAZOLIN: SIGNIFICANT CHANGE UP
-  CEFEPIME: SIGNIFICANT CHANGE UP
-  CEFOXITIN: SIGNIFICANT CHANGE UP
-  CEFTRIAXONE: SIGNIFICANT CHANGE UP
-  CIPROFLOXACIN: SIGNIFICANT CHANGE UP
-  ERTAPENEM: SIGNIFICANT CHANGE UP
-  GENTAMICIN: SIGNIFICANT CHANGE UP
-  IMIPENEM: SIGNIFICANT CHANGE UP
-  LEVOFLOXACIN: SIGNIFICANT CHANGE UP
-  MEROPENEM: SIGNIFICANT CHANGE UP
-  PIPERACILLIN/TAZOBACTAM: SIGNIFICANT CHANGE UP
-  TOBRAMYCIN: SIGNIFICANT CHANGE UP
-  TRIMETHOPRIM/SULFAMETHOXAZOLE: SIGNIFICANT CHANGE UP
ANION GAP SERPL CALC-SCNC: 10 MMOL/L — SIGNIFICANT CHANGE UP (ref 5–17)
ANISOCYTOSIS BLD QL: SLIGHT — SIGNIFICANT CHANGE UP
BASOPHILS # BLD AUTO: 0.06 K/UL — SIGNIFICANT CHANGE UP (ref 0–0.2)
BASOPHILS NFR BLD AUTO: 0.9 % — SIGNIFICANT CHANGE UP (ref 0–2)
BUN SERPL-MCNC: 24 MG/DL — HIGH (ref 7–23)
BURR CELLS BLD QL SMEAR: PRESENT — SIGNIFICANT CHANGE UP
CALCIUM SERPL-MCNC: 9.7 MG/DL — SIGNIFICANT CHANGE UP (ref 8.4–10.5)
CHLORIDE SERPL-SCNC: 94 MMOL/L — LOW (ref 96–108)
CO2 SERPL-SCNC: 26 MMOL/L — SIGNIFICANT CHANGE UP (ref 22–31)
CREAT SERPL-MCNC: 0.93 MG/DL — SIGNIFICANT CHANGE UP (ref 0.5–1.3)
CULTURE RESULTS: SIGNIFICANT CHANGE UP
EGFR: 98 ML/MIN/1.73M2 — SIGNIFICANT CHANGE UP
EOSINOPHIL # BLD AUTO: 0.05 K/UL — SIGNIFICANT CHANGE UP (ref 0–0.5)
EOSINOPHIL NFR BLD AUTO: 0.8 % — SIGNIFICANT CHANGE UP (ref 0–6)
GIANT PLATELETS BLD QL SMEAR: PRESENT — SIGNIFICANT CHANGE UP
GLUCOSE BLDC GLUCOMTR-MCNC: 147 MG/DL — HIGH (ref 70–99)
GLUCOSE BLDC GLUCOMTR-MCNC: 172 MG/DL — HIGH (ref 70–99)
GLUCOSE BLDC GLUCOMTR-MCNC: 189 MG/DL — HIGH (ref 70–99)
GLUCOSE BLDC GLUCOMTR-MCNC: 192 MG/DL — HIGH (ref 70–99)
GLUCOSE BLDC GLUCOMTR-MCNC: 229 MG/DL — HIGH (ref 70–99)
GLUCOSE SERPL-MCNC: 173 MG/DL — HIGH (ref 70–99)
HCT VFR BLD CALC: 28.4 % — LOW (ref 39–50)
HGB BLD-MCNC: 9.2 G/DL — LOW (ref 13–17)
HYPOCHROMIA BLD QL: SLIGHT — SIGNIFICANT CHANGE UP
LYMPHOCYTES # BLD AUTO: 0.94 K/UL — LOW (ref 1–3.3)
LYMPHOCYTES # BLD AUTO: 14.8 % — SIGNIFICANT CHANGE UP (ref 13–44)
MACROCYTES BLD QL: SLIGHT — SIGNIFICANT CHANGE UP
MAGNESIUM SERPL-MCNC: 1.9 MG/DL — SIGNIFICANT CHANGE UP (ref 1.6–2.6)
MANUAL SMEAR VERIFICATION: SIGNIFICANT CHANGE UP
MCHC RBC-ENTMCNC: 27.5 PG — SIGNIFICANT CHANGE UP (ref 27–34)
MCHC RBC-ENTMCNC: 32.4 GM/DL — SIGNIFICANT CHANGE UP (ref 32–36)
MCV RBC AUTO: 85 FL — SIGNIFICANT CHANGE UP (ref 80–100)
METHOD TYPE: SIGNIFICANT CHANGE UP
MICROCYTES BLD QL: SLIGHT — SIGNIFICANT CHANGE UP
MONOCYTES # BLD AUTO: 0.55 K/UL — SIGNIFICANT CHANGE UP (ref 0–0.9)
MONOCYTES NFR BLD AUTO: 8.7 % — SIGNIFICANT CHANGE UP (ref 2–14)
NEUTROPHILS # BLD AUTO: 4.69 K/UL — SIGNIFICANT CHANGE UP (ref 1.8–7.4)
NEUTROPHILS NFR BLD AUTO: 73.9 % — SIGNIFICANT CHANGE UP (ref 43–77)
ORGANISM # SPEC MICROSCOPIC CNT: SIGNIFICANT CHANGE UP
OVALOCYTES BLD QL SMEAR: SLIGHT — SIGNIFICANT CHANGE UP
PHOSPHATE SERPL-MCNC: 2.6 MG/DL — SIGNIFICANT CHANGE UP (ref 2.5–4.5)
PLAT MORPH BLD: ABNORMAL
PLATELET # BLD AUTO: 111 K/UL — LOW (ref 150–400)
POLYCHROMASIA BLD QL SMEAR: SLIGHT — SIGNIFICANT CHANGE UP
POTASSIUM SERPL-MCNC: 4.1 MMOL/L — SIGNIFICANT CHANGE UP (ref 3.5–5.3)
POTASSIUM SERPL-SCNC: 4.1 MMOL/L — SIGNIFICANT CHANGE UP (ref 3.5–5.3)
RBC # BLD: 3.34 M/UL — LOW (ref 4.2–5.8)
RBC # FLD: 15.1 % — HIGH (ref 10.3–14.5)
RBC BLD AUTO: ABNORMAL
SODIUM SERPL-SCNC: 130 MMOL/L — LOW (ref 135–145)
SPECIMEN SOURCE: SIGNIFICANT CHANGE UP
SPHEROCYTES BLD QL SMEAR: SLIGHT — SIGNIFICANT CHANGE UP
VARIANT LYMPHS # BLD: 0.9 % — SIGNIFICANT CHANGE UP (ref 0–6)
WBC # BLD: 6.35 K/UL — SIGNIFICANT CHANGE UP (ref 3.8–10.5)
WBC # FLD AUTO: 6.35 K/UL — SIGNIFICANT CHANGE UP (ref 3.8–10.5)

## 2022-09-12 PROCEDURE — 99233 SBSQ HOSP IP/OBS HIGH 50: CPT | Mod: GC

## 2022-09-12 PROCEDURE — 99222 1ST HOSP IP/OBS MODERATE 55: CPT

## 2022-09-12 RX ORDER — ENOXAPARIN SODIUM 100 MG/ML
40 INJECTION SUBCUTANEOUS EVERY 24 HOURS
Refills: 0 | Status: DISCONTINUED | OUTPATIENT
Start: 2022-09-12 | End: 2022-09-12

## 2022-09-12 RX ORDER — IOHEXOL 300 MG/ML
30 INJECTION, SOLUTION INTRAVENOUS ONCE
Refills: 0 | Status: DISCONTINUED | OUTPATIENT
Start: 2022-09-12 | End: 2022-09-12

## 2022-09-12 RX ORDER — DIATRIZOATE MEGLUMINE 180 MG/ML
30 INJECTION, SOLUTION INTRAVESICAL ONCE
Refills: 0 | Status: COMPLETED | OUTPATIENT
Start: 2022-09-12 | End: 2022-09-13

## 2022-09-12 RX ORDER — CEFTRIAXONE 500 MG/1
2000 INJECTION, POWDER, FOR SOLUTION INTRAMUSCULAR; INTRAVENOUS EVERY 24 HOURS
Refills: 0 | Status: DISCONTINUED | OUTPATIENT
Start: 2022-09-12 | End: 2022-09-17

## 2022-09-12 RX ORDER — HEPARIN SODIUM 5000 [USP'U]/ML
7500 INJECTION INTRAVENOUS; SUBCUTANEOUS EVERY 8 HOURS
Refills: 0 | Status: DISCONTINUED | OUTPATIENT
Start: 2022-09-12 | End: 2022-09-13

## 2022-09-12 RX ORDER — MIDODRINE HYDROCHLORIDE 2.5 MG/1
10 TABLET ORAL EVERY 8 HOURS
Refills: 0 | Status: DISCONTINUED | OUTPATIENT
Start: 2022-09-12 | End: 2022-09-13

## 2022-09-12 RX ADMIN — CEFTRIAXONE 100 MILLIGRAM(S): 500 INJECTION, POWDER, FOR SOLUTION INTRAMUSCULAR; INTRAVENOUS at 18:24

## 2022-09-12 RX ADMIN — Medication 1 PATCH: at 11:45

## 2022-09-12 RX ADMIN — Medication 1 PATCH: at 16:53

## 2022-09-12 RX ADMIN — ERTAPENEM SODIUM 120 MILLIGRAM(S): 1 INJECTION, POWDER, LYOPHILIZED, FOR SOLUTION INTRAMUSCULAR; INTRAVENOUS at 14:06

## 2022-09-12 RX ADMIN — LACTULOSE 20 GRAM(S): 10 SOLUTION ORAL at 17:15

## 2022-09-12 RX ADMIN — MIDODRINE HYDROCHLORIDE 15 MILLIGRAM(S): 2.5 TABLET ORAL at 09:24

## 2022-09-12 RX ADMIN — OXYCODONE HYDROCHLORIDE 10 MILLIGRAM(S): 5 TABLET ORAL at 14:07

## 2022-09-12 RX ADMIN — Medication 2: at 22:09

## 2022-09-12 RX ADMIN — HEPARIN SODIUM 7500 UNIT(S): 5000 INJECTION INTRAVENOUS; SUBCUTANEOUS at 22:09

## 2022-09-12 RX ADMIN — Medication 100 MILLIGRAM(S): at 11:45

## 2022-09-12 RX ADMIN — Medication 2: at 10:01

## 2022-09-12 RX ADMIN — MIDODRINE HYDROCHLORIDE 10 MILLIGRAM(S): 2.5 TABLET ORAL at 17:15

## 2022-09-12 RX ADMIN — Medication 1 PATCH: at 17:48

## 2022-09-12 RX ADMIN — OXYCODONE HYDROCHLORIDE 10 MILLIGRAM(S): 5 TABLET ORAL at 15:28

## 2022-09-12 RX ADMIN — Medication 1 TABLET(S): at 11:45

## 2022-09-12 RX ADMIN — PANTOPRAZOLE SODIUM 40 MILLIGRAM(S): 20 TABLET, DELAYED RELEASE ORAL at 06:16

## 2022-09-12 RX ADMIN — OXYCODONE HYDROCHLORIDE 10 MILLIGRAM(S): 5 TABLET ORAL at 07:16

## 2022-09-12 RX ADMIN — Medication 4: at 13:04

## 2022-09-12 RX ADMIN — Medication 1 PATCH: at 07:59

## 2022-09-12 RX ADMIN — LACTULOSE 20 GRAM(S): 10 SOLUTION ORAL at 06:15

## 2022-09-12 RX ADMIN — MIDODRINE HYDROCHLORIDE 15 MILLIGRAM(S): 2.5 TABLET ORAL at 01:14

## 2022-09-12 RX ADMIN — OXYCODONE HYDROCHLORIDE 10 MILLIGRAM(S): 5 TABLET ORAL at 22:09

## 2022-09-12 RX ADMIN — HEPARIN SODIUM 7500 UNIT(S): 5000 INJECTION INTRAVENOUS; SUBCUTANEOUS at 14:07

## 2022-09-12 RX ADMIN — Medication 1 MILLIGRAM(S): at 11:45

## 2022-09-12 RX ADMIN — OXYCODONE HYDROCHLORIDE 10 MILLIGRAM(S): 5 TABLET ORAL at 06:16

## 2022-09-12 NOTE — PROGRESS NOTE ADULT - PROBLEM SELECTOR PLAN 3
RESOLVED.   Likely source UTI (ESBL)  Patient meeting sepsis criteria (tachycardic, febrile)  He has low Bp, WBC of 10.74, lactate 2.5, elevated Cr (1.72), and elevated bili (1.5) and AST  Improving: Good UOP, Cr improved, bili improved, mental status is better, lactate cleared, euvolemic with good perfusion. Weaned off of levo.  Blood cultures positive for gram negative rods in all 4 bottles.

## 2022-09-12 NOTE — DISCHARGE NOTE PROVIDER - ATTENDING DISCHARGE PHYSICAL EXAMINATION:
I have read and agree with the resident Discharge Note above.  Patient seen and discussed with resident team on the day of discharge.     Briefly, 52 y/o man with PMH significant for cirrhosis, DM who presented with septic shock and found to have E coli bacteremia with likely source SBP    #Ecoli bacteremia  - previous admit with UA + for ESBL Ecoli, was not treated at that time as patient asymptomatic  - Blood cultures are E coli but NOT ESBL  - s/p paracentesis with likely SBP, did not technically meet criteria but patient was on abx for 5 days prior to tap  - Collateral obtained from [place] regarding prior paracenteses in 7/22 and 4/22 without SBP  - s/p CTX, will dc on lifelong SBP ppx with Cipro.  Discussed with patient.  Cipro preferred to Bactrim as patient with frequent AKIs.  QTc 431 on 9/9    #septic shock  - patient presented with sepsis due to E coli infection requiring levophed now improving  - Abx as above  - Weaned off midodrine  - hold all antihypertensives    #Cirrhosis  - due to EtOH abuse, no appreciable ascites on exam although limited due to habitus with excess skin due to patient's weight loss  - patient previously on Lasix and Spironolactone, now held due to hypotension  - c/w outpatient follow up, wants to establish with Kristan.  Appointments scheduled  - c/w lactulose titrated to BMs  - SBP tx and ppx as above    #LINNETTE  Resolved  - was likely pre renal due to sepsis    #Anemia  - Likely due to chronic inflammation, mild KELLY    #Thrombocytopenia  - stable, no signs of bleeding    #DM - A1c 6, not on home meds. Diet controlled  #HTN - hold all BP meds  #Tobacco abuse      Attending exam on day of discharge:   Gen: NAD, lying comfortable in bed   HEENT: NCAT, MMM, poor dentition  Neck: supple  CV: RRR, no m/g/r appreciated  Pulm: CTA B, no w/r/r, normal work of breathing  Abd: +BS, soft, NT, mildly distended.  Loose hanging skin from weight loss  : deferred  Skin: no rashes, ulcers, jaundice; intact, warm and dry  Ext: WWP, no c/c/e  MSK: 5/5 strength, normal range of motion, no swollen or erythematous joints  Neuro: AOx3, no change from baseline  Psych: pleasant, conversant and appropriate    I was physically present for the evaluation and management services provided. I agree with the included history, physical, and plan which I reviewed and edited where appropriate. I spent > 30 minutes with the patient and the patient's family on direct patient care and discharge planning with more than 50% of the visit spent on counseling and/or coordination of care.     Erlin Salcedo  [phone] I have read and agree with the resident Discharge Note above.  Patient seen and discussed with resident team on the day of discharge.     Briefly, 52 y/o man with PMH significant for cirrhosis, DM who presented with septic shock and found to have E coli bacteremia with likely source SBP    #Ecoli bacteremia  - previous admit with UA + for ESBL Ecoli, was not treated at that time as patient asymptomatic  - Blood cultures are E coli but NOT ESBL  - s/p paracentesis with likely SBP, did not technically meet criteria but patient was on abx for 5 days prior to tap  - Collateral obtained from [place] regarding prior paracenteses in 7/22 and 4/22 without SBP  - s/p CTX, will dc on lifelong SBP ppx with Cipro.  Discussed with patient.  Cipro preferred to Bactrim as patient with frequent AKIs.  QTc 431 on 9/9    #septic shock  - patient presented with sepsis due to E coli infection requiring levophed now improving  - Abx as above  - Weaned off midodrine  - hold all antihypertensives    #Cirrhosis  - due to EtOH abuse, no appreciable ascites on exam although limited due to habitus with excess skin due to patient's weight loss  - patient previously on Lasix and Spironolactone, now held due to hypotension  - c/w outpatient follow up, wants to establish with Kristan.  Appointments scheduled  - c/w lactulose titrated to BMs  - SBP tx and ppx as above    #LINNETTE  Resolved  - was likely pre renal due to sepsis    #Anemia  - Likely due to chronic inflammation, mild KELLY    #Thrombocytopenia  - stable, no signs of bleeding    #DM - A1c 6, not on home meds. Diet controlled  #HTN - hold all BP meds  #Tobacco abuse      Attending exam on day of discharge:   Gen: NAD, lying comfortable in bed   HEENT: NCAT, MMM, poor dentition  Neck: supple  CV: RRR, no m/g/r appreciated  Pulm: CTA B, no w/r/r, normal work of breathing  Abd: +BS, soft, NT, mildly distended.  Loose hanging skin from weight loss  : deferred  Skin: no rashes, ulcers, jaundice; intact, warm and dry  Ext: WWP, no c/c/e  MSK: 5/5 strength, normal range of motion, no swollen or erythematous joints  Neuro: AOx3, no change from baseline  Psych: pleasant, conversant and appropriate    I was physically present for the evaluation and management services provided. I agree with the included history, physical, and plan which I reviewed and edited where appropriate. I spent > 30 minutes with the patient and the patient's family on direct patient care and discharge planning with more than 50% of the visit spent on counseling and/or coordination of care.     Erlin Salcedo  [phone] I have read and agree with the resident Discharge Note above.  Patient seen and discussed with resident team on the day of discharge.     Briefly, 50 y/o man with PMH significant for cirrhosis, DM who presented with septic shock and found to have E coli bacteremia with likely source SBP    #Ecoli bacteremia  - previous admit with UA + for ESBL Ecoli, was not treated at that time as patient asymptomatic  - Blood cultures are E coli but NOT ESBL  - s/p paracentesis with likely SBP, did not technically meet criteria but patient was on abx for 5 days prior to tap  - Collateral obtained from [place] regarding prior paracenteses in 7/22 and 4/22 without SBP  - s/p CTX, will dc on lifelong SBP ppx with Cipro.  Discussed with patient.  Cipro preferred to Bactrim as patient with frequent AKIs.  QTc 431 on 9/9    #septic shock  - patient presented with sepsis due to E coli infection requiring levophed now improving  - Abx as above  - Weaned off midodrine  - hold all antihypertensives    #Cirrhosis  - due to EtOH abuse, no appreciable ascites on exam although limited due to habitus with excess skin due to patient's weight loss  - patient previously on Lasix and Spironolactone, now held due to hypotension  - c/w outpatient follow up, wants to establish with Kristan.  Appointments scheduled  - c/w lactulose titrated to BMs  - SBP tx and ppx as above    #LINNETTE  Resolved  - was likely pre renal due to sepsis    #Anemia  - Likely due to chronic inflammation, mild KELLY    #Thrombocytopenia  - stable, no signs of bleeding    #DM - A1c 6, not on home meds. Diet controlled  #HTN - hold all BP meds  #Tobacco abuse      Attending exam on day of discharge:   Gen: NAD, lying comfortable in bed   HEENT: NCAT, MMM, poor dentition  Neck: supple  CV: RRR, no m/g/r appreciated  Pulm: CTA B, no w/r/r, normal work of breathing  Abd: +BS, soft, NT, mildly distended.  Loose hanging skin from weight loss  : deferred  Skin: no rashes, ulcers, jaundice; intact, warm and dry  Ext: WWP, no c/c/e  MSK: 5/5 strength, normal range of motion, no swollen or erythematous joints  Neuro: AOx3, no change from baseline  Psych: pleasant, conversant and appropriate    I was physically present for the evaluation and management services provided. I agree with the included history, physical, and plan which I reviewed and edited where appropriate. I spent > 30 minutes with the patient and the patient's family on direct patient care and discharge planning with more than 50% of the visit spent on counseling and/or coordination of care.     Erlin Salcedo  [phone] I have read and agree with the resident Discharge Note above.  Patient seen and discussed with resident team on the day of discharge.     Briefly, 50 y/o man with PMH significant for cirrhosis, DM who presented with septic shock and found to have E coli bacteremia with likely source SBP    #Ecoli bacteremia  - previous admit with UA + for ESBL Ecoli, was not treated at that time as patient asymptomatic  - Blood cultures are E coli but NOT ESBL  - s/p paracentesis with likely SBP, did not technically meet criteria but patient was on abx for 5 days prior to tap  - Collateral obtained from [place] regarding prior paracenteses in 7/22 and 4/22 without SBP  - s/p CTX, will dc on lifelong SBP ppx with Cipro.  Discussed with patient.  Cipro preferred to Bactrim as patient with frequent AKIs.  QTc 431 on 9/9    #septic shock  - patient presented with sepsis due to E coli infection requiring levophed now improving  - Abx as above  - Weaned off midodrine  - hold all antihypertensives    #Cirrhosis  - due to EtOH abuse, no appreciable ascites on exam although limited due to habitus with excess skin due to patient's weight loss  - patient previously on Lasix and Spironolactone, now held due to hypotension  - c/w outpatient follow up, wants to establish with Kristan.  Appointments scheduled  - c/w lactulose titrated to BMs  - SBP tx and ppx as above    #LINNETTE  Resolved  - was likely pre renal due to sepsis    #Anemia  - Likely due to chronic inflammation, mild KELLY    #Thrombocytopenia  - stable, no signs of bleeding    #DM - A1c 6, not on home meds. Diet controlled  #HTN - hold all BP meds  #Tobacco abuse - nicotine replacement  #chronic knee pain - maintained on Oxy 10 TID PRN which will require outpatient taper, patient aware and agreeable      Attending exam on day of discharge:   Gen: NAD, lying comfortable in bed   HEENT: NCAT, MMM, poor dentition  Neck: supple  CV: RRR, no m/g/r appreciated  Pulm: CTA B, no w/r/r, normal work of breathing  Abd: +BS, soft, NT, mildly distended.  Loose hanging skin from weight loss  : deferred  Skin: no rashes, ulcers, jaundice; intact, warm and dry  Ext: WWP, no c/c/e  MSK: 5/5 strength, normal range of motion, no swollen or erythematous joints  Neuro: AOx3, no change from baseline  Psych: pleasant, conversant and appropriate    I was physically present for the evaluation and management services provided. I agree with the included history, physical, and plan which I reviewed and edited where appropriate. I spent > 30 minutes with the patient and the patient's family on direct patient care and discharge planning with more than 50% of the visit spent on counseling and/or coordination of care.     Erlin Salcedo  [phone] I have read and agree with the resident Discharge Note above.  Patient seen and discussed with resident team on the day of discharge.     Briefly, 50 y/o man with PMH significant for cirrhosis, DM who presented with septic shock and found to have E coli bacteremia with likely source SBP    #Ecoli bacteremia  - previous admit with UA + for ESBL Ecoli, was not treated at that time as patient asymptomatic  - Blood cultures are E coli but NOT ESBL  - s/p paracentesis with likely SBP, did not technically meet criteria but patient was on abx for 5 days prior to tap  - Collateral obtained from [place] regarding prior paracenteses in 7/22 and 4/22 without SBP  - s/p CTX, will dc on lifelong SBP ppx with Cipro.  Discussed with patient.  Cipro preferred to Bactrim as patient with frequent AKIs.  QTc 431 on 9/9    #septic shock  - patient presented with sepsis due to E coli infection requiring levophed now improving  - Abx as above  - Weaned off midodrine  - hold all antihypertensives    #Cirrhosis  - due to EtOH abuse, no appreciable ascites on exam although limited due to habitus with excess skin due to patient's weight loss  - patient previously on Lasix and Spironolactone, now held due to hypotension  - c/w outpatient follow up, wants to establish with Kristan.  Appointments scheduled  - c/w lactulose titrated to BMs  - SBP tx and ppx as above    #LINNETTE  Resolved  - was likely pre renal due to sepsis    #Anemia  - Likely due to chronic inflammation, mild KELLY    #Thrombocytopenia  - stable, no signs of bleeding    #DM - A1c 6, not on home meds. Diet controlled  #HTN - hold all BP meds  #Tobacco abuse - nicotine replacement  #chronic knee pain - maintained on Oxy 10 TID PRN which will require outpatient taper, patient aware and agreeable      Attending exam on day of discharge:   Gen: NAD, lying comfortable in bed   HEENT: NCAT, MMM, poor dentition  Neck: supple  CV: RRR, no m/g/r appreciated  Pulm: CTA B, no w/r/r, normal work of breathing  Abd: +BS, soft, NT, mildly distended.  Loose hanging skin from weight loss  : deferred  Skin: no rashes, ulcers, jaundice; intact, warm and dry  Ext: WWP, no c/c/e  MSK: 5/5 strength, normal range of motion, no swollen or erythematous joints  Neuro: AOx3, no change from baseline  Psych: pleasant, conversant and appropriate    I was physically present for the evaluation and management services provided. I agree with the included history, physical, and plan which I reviewed and edited where appropriate. I spent > 30 minutes with the patient and the patient's family on direct patient care and discharge planning with more than 50% of the visit spent on counseling and/or coordination of care.     Erlin Salcedo  [phone] I have read and agree with the resident Discharge Note above.  Patient seen and discussed with resident team on the day of discharge.     Briefly, 50 y/o man with PMH significant for cirrhosis, DM who presented with septic shock and found to have E coli bacteremia with likely source SBP    #Ecoli bacteremia  - previous admit with UA + for ESBL Ecoli, was not treated at that time as patient asymptomatic  - Blood cultures are E coli but NOT ESBL  - s/p paracentesis with likely SBP, did not technically meet criteria but patient was on abx for 5 days prior to tap  - Collateral obtained from [place] regarding prior paracenteses in 7/22 and 4/22 without SBP  - s/p CTX, will dc on lifelong SBP ppx with Cipro.  Discussed with patient.  Cipro preferred to Bactrim as patient with frequent AKIs.  QTc 431 on 9/9    #septic shock  - patient presented with sepsis due to E coli infection requiring levophed now improving  - Abx as above  - Weaned off midodrine  - hold all antihypertensives    #Cirrhosis  - due to EtOH abuse, no appreciable ascites on exam although limited due to habitus with excess skin due to patient's weight loss  - patient previously on Lasix and Spironolactone, now held due to hypotension  - c/w outpatient follow up, wants to establish with Kristan.  Appointments scheduled  - c/w lactulose titrated to BMs  - SBP tx and ppx as above    #LINNETTE  Resolved  - was likely pre renal due to sepsis    #Anemia  - Likely due to chronic inflammation, mild KELLY    #Thrombocytopenia  - stable, no signs of bleeding    #DM - A1c 6, not on home meds. Diet controlled  #HTN - hold all BP meds  #Tobacco abuse - nicotine replacement  #chronic knee pain - maintained on Oxy 10 TID PRN which will require outpatient taper, patient aware and agreeable      Attending exam on day of discharge:   Gen: NAD, lying comfortable in bed   HEENT: NCAT, MMM, poor dentition  Neck: supple  CV: RRR, no m/g/r appreciated  Pulm: CTA B, no w/r/r, normal work of breathing  Abd: +BS, soft, NT, mildly distended.  Loose hanging skin from weight loss  : deferred  Skin: no rashes, ulcers, jaundice; intact, warm and dry  Ext: WWP, no c/c/e  MSK: 5/5 strength, normal range of motion, no swollen or erythematous joints  Neuro: AOx3, no change from baseline  Psych: pleasant, conversant and appropriate    I was physically present for the evaluation and management services provided. I agree with the included history, physical, and plan which I reviewed and edited where appropriate. I spent > 30 minutes with the patient and the patient's family on direct patient care and discharge planning with more than 50% of the visit spent on counseling and/or coordination of care.     Erlin Salcedo  [phone]

## 2022-09-12 NOTE — PROGRESS NOTE ADULT - PROBLEM SELECTOR PLAN 4
Ab ultrasound from previous admission w/ cirrhosis: PT/INR slightly elevated.     Plan:   - C/w lactulose q8  - Abdominal US  - GI on board, will need f/u outpatient

## 2022-09-12 NOTE — PHYSICAL THERAPY INITIAL EVALUATION ADULT - PERTINENT HX OF CURRENT PROBLEM, REHAB EVAL
52 y/o man with PMH of cirrhosis, DM, TENZIN (no longer on CPAP due to insurance issues), asthma, PUD, abdominal hernia (per patient), chronic back pain, Hx of gastric bypass, and Hx of multiple paracentesis to abdomen presenting from his shelter for rigors, abdominal pain, dizziness, nausea, SOB and somnolence. He said this started on 09/07 after being discharged from [facility]. He developed RUQ, LUQ and epigastric, sharp/stabbing, non radiating abdominal pain that he rates 9/10, along with dizziness and difficulty ambulating as he would feel like he is about to "pass out". The patient has lost consciousness a few times in the past while ambulating, but has not lost consciousness leading up to this admission and never sustained trauma to his head. He has been nauseous since discharge and has not eaten for the past few days. He also gets short of breath on excretion and palpitations, but has not had any CP, changes in vision, changes in hearing, headaches, no easy bruising or bleeding, no hematuria or hematochezia, no pain with urination or urgency, but frequency was present, no constipation or diarrhea. He does admit to having lower extremity edema that requires him to elevate his legs when sleeping, lower extremity neuropathic pain for which he takes gabapentin and back pain for which he takes oxycodone.   Of note he usually follows at [facility] where he gets his medication refills and paracentesis every few months. He was admitted on September 4th to [facility] for generalized weakness, fatigue, decreased appetite, generalized abdominal discomfort, at the time he had some insurance issues which prevented him from getting his med refill and paracentesis for 2 months. It was noted that during transport from [facility] to [facility] he was hypotensive Bp 80/46, was asymptomatic, was given 1L NS which did not improve his Bp. His utox was positive for THC and cocaine and was found to have an LINNETTE (Cr 3.5), nephrology and GI consulted and given albumin (100cc) with improvement in Cr (to 1.22 on 9/6), UOP and Bp. CT A/P showed hepatic cirrhosis, small ascites, spenomegaly, anasarca suggesting portal hypertension/volume overload. His US doppler of the abdomen showed normal flow in IVC, portal, hepatic and splenic veins.  There were no concerns for SBP (no WBC count or Fevers), did not receive paracentesis (minimal ascites not amenable to drainage according to IR) and no abx were started. He developed asterixis on 9/6 was started on lactulose and discharged w/ plan to f/u with GI in a week to resume spironolactone which was stopped on D/c. His MELD score on discharge was 23 on dialysis. However, urine culture from 9/4 grew ESBL.     On this visit, he arrived to [facility] from his shelter by ambulance with the aforementioned symptoms. He was initially hemodynamically stable but on presentation his blood pressure dropped to 88/47, he was given 2.8L but his blood pressure remained low at 89/50 and he was started on levophed 52 y/o man with PMH of cirrhosis, DM, TENZIN (no longer on CPAP due to insurance issues), asthma, PUD, abdominal hernia (per patient), chronic back pain, Hx of gastric bypass, and Hx of multiple paracentesis to abdomen presenting from his shelter for rigors, abdominal pain, dizziness, nausea, SOB and somnolence. He said this started on 09/07 after being discharged from [facility]. He developed RUQ, LUQ and epigastric, sharp/stabbing, non radiating abdominal pain that he rates 9/10, along with dizziness and difficulty ambulating as he would feel like he is about to "pass out". The patient has lost consciousness a few times in the past while ambulating, but has not lost consciousness leading up to this admission and never sustained trauma to his head. He has been nauseous since discharge and has not eaten for the past few days. He also gets short of breath on excretion and palpitations, but has not had any CP, changes in vision, changes in hearing, headaches, no easy bruising or bleeding, no hematuria or hematochezia, no pain with urination or urgency, but frequency was present, no constipation or diarrhea. He does admit to having lower extremity edema that requires him to elevate his legs when sleeping, lower extremity neuropathic pain for which he takes gabapentin and back pain for which he takes oxycodone.   Of note he usually follows at [facility] where he gets his medication refills and paracentesis every few months. He was admitted on September 4th to [facility] for generalized weakness, fatigue, decreased appetite, generalized abdominal discomfort, at the time he had some insurance issues which prevented him from getting his med refill and paracentesis for 2 months. It was noted that during transport from [facility] to [facility] he was hypotensive Bp 80/46, was asymptomatic, was given 1L NS which did not improve his Bp. His utox was positive for THC and cocaine and was found to have an LINNETTE (Cr 3.5), nephrology and GI consulted and given albumin (100cc) with improvement in Cr (to 1.22 on 9/6), UOP and Bp. CT A/P showed hepatic cirrhosis, small ascites, spenomegaly, anasarca suggesting portal hypertension/volume overload. His US doppler of the abdomen showed normal flow in IVC, portal, hepatic and splenic veins.  There were no concerns for SBP (no WBC count or Fevers), did not receive paracentesis (minimal ascites not amenable to drainage according to IR) and no abx were started. He developed asterixis on 9/6 was started on lactulose and discharged w/ plan to f/u with GI in a week to resume spironolactone which was stopped on D/c. His MELD score on discharge was 23 on dialysis. However, urine culture from 9/4 grew ESBL.     On this visit, he arrived to [facility] from his shelter by ambulance with the aforementioned symptoms. He was initially hemodynamically stable but on presentation his blood pressure dropped to 88/47, he was given 2.8L but his blood pressure remained low at 89/50 and he was started on levophed 52 y/o man with PMH of cirrhosis, DM, TENZIN (no longer on CPAP due to insurance issues), asthma, PUD, abdominal hernia (per patient), chronic back pain, Hx of gastric bypass, and Hx of multiple paracentesis to abdomen presenting from his shelter for rigors, abdominal pain, dizziness, nausea, SOB and somnolence. He said this started on 09/07 after being discharged from [facility]. He developed RUQ, LUQ and epigastric, sharp/stabbing, non radiating abdominal pain that he rates 9/10, along with dizziness and difficulty ambulating as he would feel like he is about to "pass out". The patient has lost consciousness a few times in the past while ambulating, but has not lost consciousness leading up to this admission and never sustained trauma to his head. He has been nauseous since discharge and has not eaten for the past few days. He also gets short of breath on excretion and palpitations, but has not had any CP, changes in vision, changes in hearing, headaches, no easy bruising or bleeding, no hematuria or hematochezia, no pain with urination or urgency, but frequency was present, no constipation or diarrhea. He does admit to having lower extremity edema that requires him to elevate his legs when sleeping, lower extremity neuropathic pain for which he takes gabapentin and back pain for which he takes oxycodone.   Of note he usually follows at [facility] where he gets his medication refills and paracentesis every few months. He was admitted on September 4th to [facility] for generalized weakness, fatigue, decreased appetite, generalized abdominal discomfort, at the time he had some insurance issues which prevented him from getting his med refill and paracentesis for 2 months. It was noted that during transport from [facility] to [facility] he was hypotensive Bp 80/46, was asymptomatic, was given 1L NS which did not improve his Bp. His utox was positive for THC and cocaine and was found to have an LINNETTE (Cr 3.5), nephrology and GI consulted and given albumin (100cc) with improvement in Cr (to 1.22 on 9/6), UOP and Bp. CT A/P showed hepatic cirrhosis, small ascites, spenomegaly, anasarca suggesting portal hypertension/volume overload. His US doppler of the abdomen showed normal flow in IVC, portal, hepatic and splenic veins.  There were no concerns for SBP (no WBC count or Fevers), did not receive paracentesis (minimal ascites not amenable to drainage according to IR) and no abx were started. He developed asterixis on 9/6 was started on lactulose and discharged w/ plan to f/u with GI in a week to resume spironolactone which was stopped on D/c. His MELD score on discharge was 23 on dialysis. However, urine culture from 9/4 grew ESBL.     On this visit, he arrived to [facility] from his shelter by ambulance with the aforementioned symptoms. He was initially hemodynamically stable but on presentation his blood pressure dropped to 88/47, he was given 2.8L but his blood pressure remained low at 89/50 and he was started on levophed

## 2022-09-12 NOTE — PROGRESS NOTE ADULT - PROBLEM SELECTOR PLAN 1
Repeat cultures from 09/10 are negative.     Plan:   - Ertapenem 1g IV q 24h  - f/u ID recs for Ab regimen   - will likely need PICC tomorrow

## 2022-09-12 NOTE — DISCHARGE NOTE PROVIDER - NSDCMRMEDTOKEN_GEN_ALL_CORE_FT
acamprosate 333 mg oral delayed release tablet: 1 tab(s) orally 3 times a day  Bactrim  mg-160 mg oral tablet: 1 milligram(s) orally 2 times a day   gabapentin 300 mg oral capsule: 1 cap(s) orally 3 times a day  lactulose 10 g/15 mL oral syrup: 30 milliliter(s) orally every 8 hours  Lasix 40 mg oral tablet: 1 tab(s) orally once a day  oxyCODONE 10 mg oral tablet: 1 tab(s) orally 3 times a day, As Needed  sucralfate 1 g oral tablet: 1 tab(s) orally 4 times a day (before meals and at bedtime)   acamprosate 333 mg oral delayed release tablet: 1 tab(s) orally 3 times a day  folic acid 1 mg oral tablet: 1 tab(s) orally once a day  gabapentin 300 mg oral capsule: 1 cap(s) orally 3 times a day  lactulose 10 g/15 mL oral syrup: 30 milliliter(s) orally every 8 hours  Lasix 40 mg oral tablet: 1 tab(s) orally once a day  Multiple Vitamins with Minerals oral tablet: 1 tab(s) orally once a day  oxyCODONE 10 mg oral tablet: 1 tab(s) orally 3 times a day, As Needed  sucralfate 1 g oral tablet: 1 tab(s) orally 4 times a day (before meals and at bedtime)  thiamine 100 mg oral tablet: 1 tab(s) orally once a day   folic acid 1 mg oral tablet: 1 tab(s) orally once a day  lactulose 10 g/15 mL oral syrup: 30 milliliter(s) orally every 8 hours  Multiple Vitamins with Minerals oral tablet: 1 tab(s) orally once a day  oxyCODONE 10 mg oral tablet: 1 tab(s) orally 3 times a day, As Needed  sucralfate 1 g oral tablet: 1 tab(s) orally 4 times a day (before meals and at bedtime)  thiamine 100 mg oral tablet: 1 tab(s) orally once a day   ciprofloxacin 500 mg oral tablet: 1 tab(s) orally once a day   folic acid 1 mg oral tablet: 1 tab(s) orally once a day  gabapentin 300 mg oral capsule: 1 cap(s) orally 3 times a day  lactulose 10 g/15 mL oral syrup: 30 milliliter(s) orally every 8 hours, As Needed -for constipation   Multiple Vitamins with Minerals oral tablet: 1 tab(s) orally once a day  oxyCODONE 10 mg oral tablet: 1 tab(s) orally 3 times a day, As Needed  sucralfate 1 g oral tablet: 1 tab(s) orally 4 times a day (before meals and at bedtime)  thiamine 100 mg oral tablet: 1 tab(s) orally once a day   ciprofloxacin 500 mg oral tablet: 1 tab(s) orally once a day   folic acid 1 mg oral tablet: 1 tab(s) orally once a day  gabapentin 300 mg oral capsule: 1 cap(s) orally 3 times a day  lactulose 10 g/15 mL oral syrup: 30 milliliter(s) orally every 8 hours, As Needed -for constipation   Multiple Vitamins with Minerals oral tablet: 1 tab(s) orally once a day  oxyCODONE 10 mg oral tablet: 1 tab(s) orally 3 times a day, As Needed  oxyCODONE 10 mg oral tablet: 1 tab(s) orally every 8 hours MDD:30mg  sucralfate 1 g oral tablet: 1 tab(s) orally 4 times a day (before meals and at bedtime)  thiamine 100 mg oral tablet: 1 tab(s) orally once a day

## 2022-09-12 NOTE — DISCHARGE NOTE PROVIDER - NSDCFUADDAPPT_GEN_ALL_CORE_FT
Please bring your Insurance card, Photo ID and Discharge paperwork to the following appointment:    (1) Please follow up with Dr. Fanny Haynes on behalf of Dr. Re Ling at [address] [place] [address], [address] [place] [address] on 09/22/2022 at 1:30pm.    Appointment was scheduled by Ms. CARLOS Granger, Referral Coordinator.   Please bring your Insurance card, Photo ID and Discharge paperwork to the following appointment:    (1) Please follow up with Dr. Fanny Haynes on behalf of Dr. Re Ling at [address] [place] [address], [address] [place] [address] on 09/22/2022 at 1:30pm.    Appointment was scheduled by Ms. CARLOS Granger, Referral Coordinator.   Please bring your Insurance card, Photo ID and Discharge paperwork to the following appointment:    (1) Please follow up with Dr. Fanny Haynes on behalf of Dr. Re Ling at [address] [place] [address], [address] [place] [address] on 09/22/2022 at 1:30pm.    Appointment was scheduled by Ms. CARLOS Granger, Referral Coordinator.   Please bring your Insurance card, Photo ID and Discharge paperwork to the following appointments:    (1) Please follow up with Dr. Fanny Haynes on behalf of Dr. Re Ling at [address], [place] [address] on 09/22/2022 at 1:30pm.    Appointment was scheduled by Ms. CARLOS Granger, Referral Coordinator.    (2) Please follow up with your Gastroenterology Provider, Dr. Mo Barahona at [address] [place] [address] on 09/23/2022 at 1:00pm.    Appointment was scheduled by Ms. CARLOS Granger, Referral Coordinator.   Please bring your Insurance card, Photo ID and Discharge paperwork to the following appointments:    (1) Please follow up with Dr. Fanny Haynes on behalf of Dr. Re Ling at [address], [place] [address] on 09/22/2022 at 1:30pm.    Appointment was scheduled by Ms. CARLOS Granger, Referral Coordinator.    (2) Please follow up with your Gastroenterology Provider, Dr. Mo Barahona at [address] [place] [address] on 09/23/2022 at 1:00pm.    Appointment was scheduled by Ms. CARLOS Granger, Referral Coordinator.   Please bring your Insurance card, Photo ID and Discharge paperwork to the following appointments:    (1) Please follow up with Dr. Fanny Haynes on behalf of Dr. Re Ling at [address], [place] [address] on 09/22/2022 at 1:30pm.    Appointment was scheduled by Ms. CARLOS Granger, Referral Coordinator.    (2) Please follow up with your Gastroenterology Provider, Dr. Mo Barahona at [address] [place] [address] on 09/23/2022 at 1:00pm.    Appointment was scheduled by Ms. CARLOS Granger, Referral Coordinator.

## 2022-09-12 NOTE — PROGRESS NOTE ADULT - ASSESSMENT
52 y/o man with PMH significant for cirrhosis, DM with previously untreated ESBL UTI, presenting with rigors, AMS, fever and hemodynamic instability with elevated WBC count likely due to multiorgan failure 2/2 untreated ESBL UTI and E.Coli bacteremia.  50 y/o man with PMH significant for cirrhosis, DM with previously untreated ESBL UTI, presenting with rigors, AMS, fever and hemodynamic instability with elevated WBC count likely due to multiorgan failure 2/2 untreated ESBL UTI and E.Coli bacteremia.

## 2022-09-12 NOTE — PROGRESS NOTE ADULT - ATTENDING COMMENTS
52 y/o man with PMH significant for cirrhosis, DM who presented with septic shock and found to have E coli bacteremia    #Ecoli bacteremia  - previous admit with UA + for ESBL Ecoli, was not treated at that time as patient asymptomatic  - Blood cultures are E coli but NOT ESBL  - ID consulted for recommendations on if treatment should continue for ESBL strain found in urine or de-escalate coverage for current CTX sensitive E coli bacteremia.  Appreciate recs    #septic shock  - patient presented with sepsis due to E coli infection requiring levophed now improving  - Abx as above  - Weaned midodrine to 10 q8 today from 15, will wean as tolerated  - hold all BP medications    #Cirrhosis  - due to EtOH abuse, no appreciable ascites on exam although limited due to habitus with excess skin due to patient's weight loss  - patient previously on Lasix and Spironolactone, now held due to hypotension  - c/w outpatient follow up with [facility].  Will likely not be able to restart diuretic therapy during this admission  - c/w lactulose  - no known hx of SBP, although patient poor historian. No amenable pocket for paracentesis in last admission  - c/w DVT ppx with heparin    #LINNETTE  - patient presented with Cr 1.7 from dc Cr 1.2, now downtrend to 0.9. Resolved  - was likely pre renal due to sepsis    #Anemia  - f/u iron studies no signs of bleeding.  Likely due to chronic inflammation    #Thrombocytopenia  - stable, no signs of bleeding    #DM - c/w ISS  #HTN - now hypotensive, hold all BP meds  #Tobacco abuse - nicotine replacement    Remainder of plan as above 52 y/o man with PMH significant for cirrhosis, DM who presented with septic shock and found to have E coli bacteremia    #Ecoli bacteremia  - previous admit with UA + for ESBL Ecoli, was not treated at that time as patient asymptomatic  - Blood cultures are E coli but NOT ESBL  - ID consulted for recommendations on if treatment should continue for ESBL strain found in urine or de-escalate coverage for current CTX sensitive E coli bacteremia.  Appreciate recs    #septic shock  - patient presented with sepsis due to E coli infection requiring levophed now improving  - Abx as above  - Weaned midodrine to 10 q8 today from 15, will wean as tolerated  - hold all BP medications    #Cirrhosis  - due to EtOH abuse, no appreciable ascites on exam although limited due to habitus with excess skin due to patient's weight loss  - patient previously on Lasix and Spironolactone, now held due to hypotension  - c/w outpatient follow up with [facility].  Will likely not be able to restart diuretic therapy during this admission  - c/w lactulose  - no known hx of SBP, although patient poor historian. No amenable pocket for paracentesis in last admission  - c/w DVT ppx with heparin    #LINNETTE  - patient presented with Cr 1.7 from dc Cr 1.2, now downtrend to 0.9. Resolved  - was likely pre renal due to sepsis    #Anemia  - f/u iron studies no signs of bleeding.  Likely due to chronic inflammation    #Thrombocytopenia  - stable, no signs of bleeding    #DM - c/w ISS  #HTN - now hypotensive, hold all BP meds  #Tobacco abuse - nicotine replacement    Remainder of plan as above 52 y/o man with PMH significant for cirrhosis, DM who presented with septic shock and found to have E coli bacteremia    #Ecoli bacteremia  - previous admit with UA + for ESBL Ecoli, was not treated at that time as patient asymptomatic  - Blood cultures are E coli but NOT ESBL  - ID consulted for recommendations on if treatment should continue for ESBL strain found in urine or de-escalate coverage for current CTX sensitive E coli bacteremia.  Appreciate recs    #septic shock  - patient presented with sepsis due to E coli infection requiring levophed now improving  - Abx as above  - Weaned midodrine to 10 q8 today from 15, will wean as tolerated  - hold all BP medications    #Cirrhosis  - due to EtOH abuse, no appreciable ascites on exam although limited due to habitus with excess skin due to patient's weight loss  - patient previously on Lasix and Spironolactone, now held due to hypotension  - c/w outpatient follow up with [facility].  Will likely not be able to restart diuretic therapy during this admission  - c/w lactulose  - no known hx of SBP, although patient poor historian. No amenable pocket for paracentesis in last admission  - c/w DVT ppx with heparin    #LINNETTE  - patient presented with Cr 1.7 from dc Cr 1.2, now downtrend to 0.9. Resolved  - was likely pre renal due to sepsis    #Anemia  - f/u iron studies no signs of bleeding.  Likely due to chronic inflammation    #Thrombocytopenia  - stable, no signs of bleeding    #DM - c/w ISS  #HTN - now hypotensive, hold all BP meds  #Tobacco abuse - nicotine replacement    Remainder of plan as above

## 2022-09-12 NOTE — DISCHARGE NOTE PROVIDER - DETAILS OF MALNUTRITION DIAGNOSIS/DIAGNOSES
This patient has been assessed with a concern for Malnutrition and was treated during this hospitalization for the following Nutrition diagnosis/diagnoses:     -  09/11/2022: Severe protein-calorie malnutrition

## 2022-09-12 NOTE — CONSULT NOTE ADULT - ASSESSMENT
52 y/o man with PMH significant for cirrhosis with frequent paracentesis every few months at [facility], ETOH abuse disorder, DM with previously + ESBL E coli on Urine culture on admission from 09/04-09/06 which was asymptomatic and patient did not receive antibiotics during that admission. Since discharged form hospital on 09/7 patient has c/o RUQ, LUQ and epigastric pain which is sharp and stabbing in nature, and has had multiple episodes of LOC. He presented tachycardic and hypotensive requiring 2.8L NS and levophed, admitted to [facility]. Patient had a UA from 09/09 which was positive for bacteria, many WBC and small LE, negative nitrite and was started on ertapenem. He is w/o leukocytosis and currently afebrile. Blood culture + E. coli sensitive to CTX. His exam is significant for abdominal TTP in multiple quadrants and had denied urinary symptoms. Initially culture from 09/04 not likely from urinary source. Urine culture 5 days later w/o treatment growing <10,000 CFU or normal urogenital christo. Would pursue further workup for abdominal etiologies of positive bacteremia     Recommend:   - Switching to CTX 2g IV q24 hour   - Recommend CT A/P with PO contrast, IV w/ and w/o contrast  - Continue to follow surveillance cx from 09/10 - TD.       Team 1 will continue to follow. Case discussed with Dr. Cristobal and primary team.     Note is not finalized until attending attestation is complete.  50 y/o man with PMH significant for cirrhosis with frequent paracentesis every few months at [facility], ETOH abuse disorder, DM with previously + ESBL E coli on Urine culture on admission from 09/04-09/06 which was asymptomatic and patient did not receive antibiotics during that admission. Since discharged form hospital on 09/7 patient has c/o RUQ, LUQ and epigastric pain which is sharp and stabbing in nature, and has had multiple episodes of LOC. He presented tachycardic and hypotensive requiring 2.8L NS and levophed, admitted to [facility]. Patient had a UA from 09/09 which was positive for bacteria, many WBC and small LE, negative nitrite and was started on ertapenem. He is w/o leukocytosis and currently afebrile. Blood culture + E. coli sensitive to CTX. His exam is significant for abdominal TTP in multiple quadrants and had denied urinary symptoms. Initially culture from 09/04 not likely from urinary source. Urine culture 5 days later w/o treatment growing <10,000 CFU or normal urogenital christo. Would pursue further workup for abdominal etiologies of positive bacteremia     Recommend:   - Switching to CTX 2g IV q24 hour   - Recommend CT A/P with PO contrast, IV w/ and w/o contrast  - Continue to follow surveillance cx from 09/10 - TD.       Team 1 will continue to follow. Case discussed with Dr. Cristobal and primary team.     Note is not finalized until attending attestation is complete.  52 y/o man with PMH significant for cirrhosis with frequent paracentesis every few months at [facility], ETOH abuse disorder, DM with previously + ESBL E coli on Urine culture on admission from 09/04-09/06 which was asymptomatic and patient did not receive antibiotics during that admission. Since discharged form hospital on 09/7 patient has c/o RUQ, LUQ and epigastric pain which is sharp and stabbing in nature, and has had multiple episodes of LOC. He presented tachycardic and hypotensive requiring 2.8L NS and levophed, admitted to [facility]. Patient had a UA from 09/09 which was positive for bacteria, many WBC and small LE, negative nitrite and was started on ertapenem. He is w/o leukocytosis and currently afebrile. Blood culture + E. coli sensitive to CTX. His exam is significant for abdominal TTP in multiple quadrants and had denied urinary symptoms. Initially culture from 09/04 not likely from urinary source. Urine culture 5 days later w/o treatment growing <10,000 CFU or normal urogenital christo. Would pursue further workup for abdominal etiologies of positive bacteremia     Recommend:   - Switching to CTX 2g IV q24 hour   - Recommend CT A/P with PO contrast, IV w/ and w/o contrast  - Continue to follow surveillance cx from 09/10 - TD.       Team 1 will continue to follow. Case discussed with Dr. Cristobal and primary team.     Note is not finalized until attending attestation is complete.

## 2022-09-12 NOTE — DISCHARGE NOTE PROVIDER - PROVIDER TOKENS
PROVIDER:[TOKEN:[[id number]],FOLLOWUP:[1 week]] PROVIDER:[TOKEN:[[id number]],SCHEDULEDAPPT:[09/22/2022],SCHEDULEDAPPTTIME:[01:30 PM]] PROVIDER:[TOKEN:[[id number]],SCHEDULEDAPPT:[09/22/2022],SCHEDULEDAPPTTIME:[01:30 PM]],PROVIDER:[TOKEN:[[id number]],SCHEDULEDAPPT:[09/23/2022],SCHEDULEDAPPTTIME:[01:00 PM]] PROVIDER:[TOKEN:[[id number]],SCHEDULEDAPPT:[09/22/2022],SCHEDULEDAPPTTIME:[01:30 PM]],PROVIDER:[TOKEN:[[id number]],SCHEDULEDAPPT:[09/23/2022],SCHEDULEDAPPTTIME:[01:00 PM]] PROVIDER:[TOKEN:[[id number]],SCHEDULEDAPPT:[09/22/2022],SCHEDULEDAPPTTIME:[01:30 PM]],PROVIDER:[TOKEN:[[id number]],SCHEDULEDAPPT:[09/23/2022],SCHEDULEDAPPTTIME:[01:00 PM]]

## 2022-09-12 NOTE — CONSULT NOTE ADULT - SUBJECTIVE AND OBJECTIVE BOX
Patient is a 51y old  Male who presents with a chief complaint of EVAL     (11 Sep 2022 14:22)        HPI:  52 y/o man with PMH of cirrhosis, DM, TENZIN (no longer on CPAP due to insurance issues), asthma, PUD, abdominal hernia (per patient), chronic back pain, Hx of gastric bypass, and Hx of multiple paracentesis to abdomen presenting from his shelter for rigors, abdominal pain, dizziness, nausea, SOB and somnolence. He said this started on 09/07 after being discharged from [facility]. He developed RUQ, LUQ and epigastric, sharp/stabbing, non radiating abdominal pain that he rates 9/10, along with dizziness and difficulty ambulating as he would feel like he is about to "pass out". The patient has lost consciousness a few times in the past while ambulating, but has not lost consciousness leading up to this admission and never sustained trauma to his head. He has been nauseous since discharge and has not eaten for the past few days. He also gets short of breath on excretion and palpitations, but has not had any CP, changes in vision, changes in hearing, headaches, no easy bruising or bleeding, no hematuria or hematochezia, no pain with urination or urgency, but frequency was present, no constipation or diarrhea. He does admit to having lower extremity edema that requires him to elevate his legs when sleeping, lower extremity neuropathic pain for which he takes gabapentin and back pain for which he takes oxycodone.   Of note he usually follows at [facility] where he gets his medication refills and paracentesis every few months. He was admitted on September 4th to [facility] for generalized weakness, fatigue, decreased appetite, generalized abdominal discomfort, at the time he had some insurance issues which prevented him from getting his med refill and paracentesis for 2 months. It was noted that during transport from [facility] to [facility] he was hypotensive Bp 80/46, was asymptomatic, was given 1L NS which did not improve his Bp. His utox was positive for THC and cocaine and was found to have an LINNETTE (Cr 3.5), nephrology and GI consulted and given albumin (100cc) with improvement in Cr (to 1.22 on 9/6), UOP and Bp. CT A/P showed hepatic cirrhosis, small ascites, spenomegaly, anasarca suggesting portal hypertension/volume overload. His US doppler of the abdomen showed normal flow in IVC, portal, hepatic and splenic veins.  There were no concerns for SBP (no WBC count or Fevers), did not receive paracentesis (minimal ascites not amenable to drainage according to IR) and no abx were started. He developed asterixis on 9/6 was started on lactulose and discharged w/ plan to f/u with GI in a week to resume spironolactone which was stopped on D/c. His MELD score on discharge was 23 on dialysis. However, urine culture from 9/4 grew ESBL.     On this visit, he arrived to [facility] from his shelter by ambulance with the aforementioned symptoms. He was initially hemodynamically stable but on presentation his blood pressure dropped to 88/47, he was given 2.8L but his blood pressure remained low at 89/50 and he was started on levophed    In the ED:  VS:   T 102.5, , Bp 131/85==>88/47==>89/40, spO2 97  Labs: WBC 10.47, Hb 9.2 (baseline), Plts 79, PT 19.5, INR 1.67, Na 126, Cl 90, Cr 1.72 bili 1.5,   UA negative nitrites and positive Leuk esterase, Covid negative, CXR wnl  He was given Acetaminophen and ibuprofen, Ceftriaxone, Zosyn, Bactrim  NS 2.8L and started Levophed   He had 2L UOP in [facility]   Blood and urine cultures were collected    (09 Sep 2022 21:30)      PAST MEDICAL & SURGICAL HISTORY:  Diabetes mellitus      Alcohol abuse      Smoking      TENZIN (obstructive sleep apnea)      Asthma      Peptic ulcer disease      Cirrhosis      H/O gastric bypass          MEDICATIONS  (STANDING):  dextrose 5%. 1000 milliLiter(s) (50 mL/Hr) IV Continuous <Continuous>  dextrose 5%. 1000 milliLiter(s) (100 mL/Hr) IV Continuous <Continuous>  dextrose 50% Injectable 25 Gram(s) IV Push once  dextrose 50% Injectable 12.5 Gram(s) IV Push once  dextrose 50% Injectable 25 Gram(s) IV Push once  ertapenem  IVPB 1000 milliGRAM(s) IV Intermittent every 24 hours  folic acid 1 milliGRAM(s) Oral daily  glucagon  Injectable 1 milliGRAM(s) IntraMuscular once  heparin   Injectable 5000 Unit(s) SubCutaneous every 8 hours  insulin lispro (ADMELOG) corrective regimen sliding scale   SubCutaneous Before meals and at bedtime  lactulose Syrup 20 Gram(s) Oral every 12 hours  midodrine. 15 milliGRAM(s) Oral three times a day  multivitamin/minerals 1 Tablet(s) Oral daily  nicotine -  14 mG/24Hr(s) Patch 1 Patch Transdermal daily  oxyCODONE    IR 10 milliGRAM(s) Oral three times a day  pantoprazole    Tablet 40 milliGRAM(s) Oral before breakfast  thiamine 100 milliGRAM(s) Oral daily    MEDICATIONS  (PRN):  dextrose Oral Gel 15 Gram(s) Oral once PRN Blood Glucose LESS THAN 70 milliGRAM(s)/deciliter              FAMILY HISTORY:  FH: kidney disease    FH: diabetes mellitus      CBC Full  -  ( 12 Sep 2022 08:33 )  WBC Count : 6.35 K/uL  RBC Count : 3.34 M/uL  Hemoglobin : 9.2 g/dL  Hematocrit : 28.4 %  Platelet Count - Automated : 111 K/uL  Mean Cell Volume : 85.0 fl  Mean Cell Hemoglobin : 27.5 pg  Mean Cell Hemoglobin Concentration : 32.4 gm/dL  Auto Neutrophil # : x  Auto Lymphocyte # : x  Auto Monocyte # : x  Auto Eosinophil # : x  Auto Basophil # : x  Auto Neutrophil % : x  Auto Lymphocyte % : x  Auto Monocyte % : x  Auto Eosinophil % : x  Auto Basophil % : x      09-11    126<L>  |  x   |  x   ----------------------------<  x   x    |  x   |  1.22      TPro  x   /  Alb  x   /  TBili  1.0  /  DBili  x   /  AST  x   /  ALT  x   /  AlkPhos  x   09-11            Radiology :    < from: Xray Chest 1 View AP/PA (09.09.22 @ 15:50) >  ACC: [id number] EXAM:  XR CHEST AP OR PA 1V                          PROCEDURE DATE:  09/09/2022          INTERPRETATION:  TECHNIQUE: Single portable view of the chest.    COMPARISON:  9/4/2022    CLINICAL HISTORY: fatigue    FINDINGS:    Single frontal view of the chest demonstrates the lungs to be clear. The   cardiomediastinal silhouette is normal. No acute osseous abnormalities.    IMPRESSION: No acute cardiopulmonary disease process.            Vital Signs Last 24 Hrs  T(C): 37.1 (12 Sep 2022 05:39), Max: 37.7 (11 Sep 2022 20:59)  T(F): 98.8 (12 Sep 2022 05:39), Max: 99.8 (11 Sep 2022 20:59)  HR: 87 (12 Sep 2022 05:39) (87 - 100)  BP: 112/73 (12 Sep 2022 05:39) (109/67 - 118/71)  BP(mean): --  RR: 15 (12 Sep 2022 05:39) (10 - 16)  SpO2: 100% (12 Sep 2022 05:39) (99% - 100%)    Parameters below as of 12 Sep 2022 05:39  Patient On (Oxygen Delivery Method): BiPAP/CPAP            REVIEW OF SYSTEMS:   per HPI           Physical Exam:  on contact ESBL/ECOLI urine isolation , 51 y o man lying in semi Hahn's position , awake , alert , no complaints      Neurologic Exam:    Alert and oriented  x 3     Motor Exam:    Right UE:               no focal weakness ,  > 3+/5 throughout                                 Left UE:                 no focal weakness,  > 3+/5 throughout         Right LE:                no focal weakness,  > 3+/5 throughout     Left LE:                  no focal weakness,  > 3+/5 throughout           Sensation:         intact to light touch x 4 extremities                        DTR :                     biceps/brachioradialis : equal                                              patella/ankle : equal      Gait :  not tested              PM&R Impression : admitted for TME/sepsis/uti     Recommendations / Plan :     1) Physical / Occupational therapy focusing on therapeutic exercises , equipment evaluation , bed mobility/transfer out of bed evaluation , progressive ambulation with assistive devices prn .    2) Anticipated Disposition Plan / Recs  :   d/c home/shelter      Patient is a 51y old  Male who presents with a chief complaint of EVAL     (11 Sep 2022 14:22)        HPI:  52 y/o man with PMH of cirrhosis, DM, TENZIN (no longer on CPAP due to insurance issues), asthma, PUD, abdominal hernia (per patient), chronic back pain, Hx of gastric bypass, and Hx of multiple paracentesis to abdomen presenting from his shelter for rigors, abdominal pain, dizziness, nausea, SOB and somnolence. He said this started on 09/07 after being discharged from [facility]. He developed RUQ, LUQ and epigastric, sharp/stabbing, non radiating abdominal pain that he rates 9/10, along with dizziness and difficulty ambulating as he would feel like he is about to "pass out". The patient has lost consciousness a few times in the past while ambulating, but has not lost consciousness leading up to this admission and never sustained trauma to his head. He has been nauseous since discharge and has not eaten for the past few days. He also gets short of breath on excretion and palpitations, but has not had any CP, changes in vision, changes in hearing, headaches, no easy bruising or bleeding, no hematuria or hematochezia, no pain with urination or urgency, but frequency was present, no constipation or diarrhea. He does admit to having lower extremity edema that requires him to elevate his legs when sleeping, lower extremity neuropathic pain for which he takes gabapentin and back pain for which he takes oxycodone.   Of note he usually follows at [facility] where he gets his medication refills and paracentesis every few months. He was admitted on September 4th to [facility] for generalized weakness, fatigue, decreased appetite, generalized abdominal discomfort, at the time he had some insurance issues which prevented him from getting his med refill and paracentesis for 2 months. It was noted that during transport from [facility] to [facility] he was hypotensive Bp 80/46, was asymptomatic, was given 1L NS which did not improve his Bp. His utox was positive for THC and cocaine and was found to have an LINNETTE (Cr 3.5), nephrology and GI consulted and given albumin (100cc) with improvement in Cr (to 1.22 on 9/6), UOP and Bp. CT A/P showed hepatic cirrhosis, small ascites, spenomegaly, anasarca suggesting portal hypertension/volume overload. His US doppler of the abdomen showed normal flow in IVC, portal, hepatic and splenic veins.  There were no concerns for SBP (no WBC count or Fevers), did not receive paracentesis (minimal ascites not amenable to drainage according to IR) and no abx were started. He developed asterixis on 9/6 was started on lactulose and discharged w/ plan to f/u with GI in a week to resume spironolactone which was stopped on D/c. His MELD score on discharge was 23 on dialysis. However, urine culture from 9/4 grew ESBL.     On this visit, he arrived to [facility] from his shelter by ambulance with the aforementioned symptoms. He was initially hemodynamically stable but on presentation his blood pressure dropped to 88/47, he was given 2.8L but his blood pressure remained low at 89/50 and he was started on levophed    In the ED:  VS:   T 102.5, , Bp 131/85==>88/47==>89/40, spO2 97  Labs: WBC 10.47, Hb 9.2 (baseline), Plts 79, PT 19.5, INR 1.67, Na 126, Cl 90, Cr 1.72 bili 1.5,   UA negative nitrites and positive Leuk esterase, Covid negative, CXR wnl  He was given Acetaminophen and ibuprofen, Ceftriaxone, Zosyn, Bactrim  NS 2.8L and started Levophed   He had 2L UOP in [facility]   Blood and urine cultures were collected    (09 Sep 2022 21:30)      PAST MEDICAL & SURGICAL HISTORY:  Diabetes mellitus      Alcohol abuse      Smoking      TENZIN (obstructive sleep apnea)      Asthma      Peptic ulcer disease      Cirrhosis      H/O gastric bypass          MEDICATIONS  (STANDING):  dextrose 5%. 1000 milliLiter(s) (50 mL/Hr) IV Continuous <Continuous>  dextrose 5%. 1000 milliLiter(s) (100 mL/Hr) IV Continuous <Continuous>  dextrose 50% Injectable 25 Gram(s) IV Push once  dextrose 50% Injectable 12.5 Gram(s) IV Push once  dextrose 50% Injectable 25 Gram(s) IV Push once  ertapenem  IVPB 1000 milliGRAM(s) IV Intermittent every 24 hours  folic acid 1 milliGRAM(s) Oral daily  glucagon  Injectable 1 milliGRAM(s) IntraMuscular once  heparin   Injectable 5000 Unit(s) SubCutaneous every 8 hours  insulin lispro (ADMELOG) corrective regimen sliding scale   SubCutaneous Before meals and at bedtime  lactulose Syrup 20 Gram(s) Oral every 12 hours  midodrine. 15 milliGRAM(s) Oral three times a day  multivitamin/minerals 1 Tablet(s) Oral daily  nicotine -  14 mG/24Hr(s) Patch 1 Patch Transdermal daily  oxyCODONE    IR 10 milliGRAM(s) Oral three times a day  pantoprazole    Tablet 40 milliGRAM(s) Oral before breakfast  thiamine 100 milliGRAM(s) Oral daily    MEDICATIONS  (PRN):  dextrose Oral Gel 15 Gram(s) Oral once PRN Blood Glucose LESS THAN 70 milliGRAM(s)/deciliter              FAMILY HISTORY:  FH: kidney disease    FH: diabetes mellitus      CBC Full  -  ( 12 Sep 2022 08:33 )  WBC Count : 6.35 K/uL  RBC Count : 3.34 M/uL  Hemoglobin : 9.2 g/dL  Hematocrit : 28.4 %  Platelet Count - Automated : 111 K/uL  Mean Cell Volume : 85.0 fl  Mean Cell Hemoglobin : 27.5 pg  Mean Cell Hemoglobin Concentration : 32.4 gm/dL  Auto Neutrophil # : x  Auto Lymphocyte # : x  Auto Monocyte # : x  Auto Eosinophil # : x  Auto Basophil # : x  Auto Neutrophil % : x  Auto Lymphocyte % : x  Auto Monocyte % : x  Auto Eosinophil % : x  Auto Basophil % : x      09-11    126<L>  |  x   |  x   ----------------------------<  x   x    |  x   |  1.22      TPro  x   /  Alb  x   /  TBili  1.0  /  DBili  x   /  AST  x   /  ALT  x   /  AlkPhos  x   09-11            Radiology :    < from: Xray Chest 1 View AP/PA (09.09.22 @ 15:50) >  ACC: [id number] EXAM:  XR CHEST AP OR PA 1V                          PROCEDURE DATE:  09/09/2022          INTERPRETATION:  TECHNIQUE: Single portable view of the chest.    COMPARISON:  9/4/2022    CLINICAL HISTORY: fatigue    FINDINGS:    Single frontal view of the chest demonstrates the lungs to be clear. The   cardiomediastinal silhouette is normal. No acute osseous abnormalities.    IMPRESSION: No acute cardiopulmonary disease process.            Vital Signs Last 24 Hrs  T(C): 37.1 (12 Sep 2022 05:39), Max: 37.7 (11 Sep 2022 20:59)  T(F): 98.8 (12 Sep 2022 05:39), Max: 99.8 (11 Sep 2022 20:59)  HR: 87 (12 Sep 2022 05:39) (87 - 100)  BP: 112/73 (12 Sep 2022 05:39) (109/67 - 118/71)  BP(mean): --  RR: 15 (12 Sep 2022 05:39) (10 - 16)  SpO2: 100% (12 Sep 2022 05:39) (99% - 100%)    Parameters below as of 12 Sep 2022 05:39  Patient On (Oxygen Delivery Method): BiPAP/CPAP            REVIEW OF SYSTEMS:   per HPI           Physical Exam:  on contact ESBL/ECOLI urine isolation , 51 y o man lying in semi Hahn's position , awake , alert , no complaints      Neurologic Exam:    Alert and oriented  x 3     Motor Exam:    Right UE:               no focal weakness ,  > 3+/5 throughout                                 Left UE:                 no focal weakness,  > 3+/5 throughout         Right LE:                no focal weakness,  > 3+/5 throughout     Left LE:                  no focal weakness,  > 3+/5 throughout           Sensation:         intact to light touch x 4 extremities                        DTR :                     biceps/brachioradialis : equal                                              patella/ankle : equal      Gait :  not tested              PM&R Impression : admitted for TME/sepsis/uti     Recommendations / Plan :     1) Physical / Occupational therapy focusing on therapeutic exercises , equipment evaluation , bed mobility/transfer out of bed evaluation , progressive ambulation with assistive devices prn .    2) Anticipated Disposition Plan / Recs  :   d/c home/shelter      Patient is a 51y old  Male who presents with a chief complaint of EVAL     (11 Sep 2022 14:22)        HPI:  50 y/o man with PMH of cirrhosis, DM, TENZIN (no longer on CPAP due to insurance issues), asthma, PUD, abdominal hernia (per patient), chronic back pain, Hx of gastric bypass, and Hx of multiple paracentesis to abdomen presenting from his shelter for rigors, abdominal pain, dizziness, nausea, SOB and somnolence. He said this started on 09/07 after being discharged from [facility]. He developed RUQ, LUQ and epigastric, sharp/stabbing, non radiating abdominal pain that he rates 9/10, along with dizziness and difficulty ambulating as he would feel like he is about to "pass out". The patient has lost consciousness a few times in the past while ambulating, but has not lost consciousness leading up to this admission and never sustained trauma to his head. He has been nauseous since discharge and has not eaten for the past few days. He also gets short of breath on excretion and palpitations, but has not had any CP, changes in vision, changes in hearing, headaches, no easy bruising or bleeding, no hematuria or hematochezia, no pain with urination or urgency, but frequency was present, no constipation or diarrhea. He does admit to having lower extremity edema that requires him to elevate his legs when sleeping, lower extremity neuropathic pain for which he takes gabapentin and back pain for which he takes oxycodone.   Of note he usually follows at [facility] where he gets his medication refills and paracentesis every few months. He was admitted on September 4th to [facility] for generalized weakness, fatigue, decreased appetite, generalized abdominal discomfort, at the time he had some insurance issues which prevented him from getting his med refill and paracentesis for 2 months. It was noted that during transport from [facility] to [facility] he was hypotensive Bp 80/46, was asymptomatic, was given 1L NS which did not improve his Bp. His utox was positive for THC and cocaine and was found to have an LINNETTE (Cr 3.5), nephrology and GI consulted and given albumin (100cc) with improvement in Cr (to 1.22 on 9/6), UOP and Bp. CT A/P showed hepatic cirrhosis, small ascites, spenomegaly, anasarca suggesting portal hypertension/volume overload. His US doppler of the abdomen showed normal flow in IVC, portal, hepatic and splenic veins.  There were no concerns for SBP (no WBC count or Fevers), did not receive paracentesis (minimal ascites not amenable to drainage according to IR) and no abx were started. He developed asterixis on 9/6 was started on lactulose and discharged w/ plan to f/u with GI in a week to resume spironolactone which was stopped on D/c. His MELD score on discharge was 23 on dialysis. However, urine culture from 9/4 grew ESBL.     On this visit, he arrived to [facility] from his shelter by ambulance with the aforementioned symptoms. He was initially hemodynamically stable but on presentation his blood pressure dropped to 88/47, he was given 2.8L but his blood pressure remained low at 89/50 and he was started on levophed    In the ED:  VS:   T 102.5, , Bp 131/85==>88/47==>89/40, spO2 97  Labs: WBC 10.47, Hb 9.2 (baseline), Plts 79, PT 19.5, INR 1.67, Na 126, Cl 90, Cr 1.72 bili 1.5,   UA negative nitrites and positive Leuk esterase, Covid negative, CXR wnl  He was given Acetaminophen and ibuprofen, Ceftriaxone, Zosyn, Bactrim  NS 2.8L and started Levophed   He had 2L UOP in [facility]   Blood and urine cultures were collected    (09 Sep 2022 21:30)      PAST MEDICAL & SURGICAL HISTORY:  Diabetes mellitus      Alcohol abuse      Smoking      TENZIN (obstructive sleep apnea)      Asthma      Peptic ulcer disease      Cirrhosis      H/O gastric bypass          MEDICATIONS  (STANDING):  dextrose 5%. 1000 milliLiter(s) (50 mL/Hr) IV Continuous <Continuous>  dextrose 5%. 1000 milliLiter(s) (100 mL/Hr) IV Continuous <Continuous>  dextrose 50% Injectable 25 Gram(s) IV Push once  dextrose 50% Injectable 12.5 Gram(s) IV Push once  dextrose 50% Injectable 25 Gram(s) IV Push once  ertapenem  IVPB 1000 milliGRAM(s) IV Intermittent every 24 hours  folic acid 1 milliGRAM(s) Oral daily  glucagon  Injectable 1 milliGRAM(s) IntraMuscular once  heparin   Injectable 5000 Unit(s) SubCutaneous every 8 hours  insulin lispro (ADMELOG) corrective regimen sliding scale   SubCutaneous Before meals and at bedtime  lactulose Syrup 20 Gram(s) Oral every 12 hours  midodrine. 15 milliGRAM(s) Oral three times a day  multivitamin/minerals 1 Tablet(s) Oral daily  nicotine -  14 mG/24Hr(s) Patch 1 Patch Transdermal daily  oxyCODONE    IR 10 milliGRAM(s) Oral three times a day  pantoprazole    Tablet 40 milliGRAM(s) Oral before breakfast  thiamine 100 milliGRAM(s) Oral daily    MEDICATIONS  (PRN):  dextrose Oral Gel 15 Gram(s) Oral once PRN Blood Glucose LESS THAN 70 milliGRAM(s)/deciliter              FAMILY HISTORY:  FH: kidney disease    FH: diabetes mellitus      CBC Full  -  ( 12 Sep 2022 08:33 )  WBC Count : 6.35 K/uL  RBC Count : 3.34 M/uL  Hemoglobin : 9.2 g/dL  Hematocrit : 28.4 %  Platelet Count - Automated : 111 K/uL  Mean Cell Volume : 85.0 fl  Mean Cell Hemoglobin : 27.5 pg  Mean Cell Hemoglobin Concentration : 32.4 gm/dL  Auto Neutrophil # : x  Auto Lymphocyte # : x  Auto Monocyte # : x  Auto Eosinophil # : x  Auto Basophil # : x  Auto Neutrophil % : x  Auto Lymphocyte % : x  Auto Monocyte % : x  Auto Eosinophil % : x  Auto Basophil % : x      09-11    126<L>  |  x   |  x   ----------------------------<  x   x    |  x   |  1.22      TPro  x   /  Alb  x   /  TBili  1.0  /  DBili  x   /  AST  x   /  ALT  x   /  AlkPhos  x   09-11            Radiology :    < from: Xray Chest 1 View AP/PA (09.09.22 @ 15:50) >  ACC: [id number] EXAM:  XR CHEST AP OR PA 1V                          PROCEDURE DATE:  09/09/2022          INTERPRETATION:  TECHNIQUE: Single portable view of the chest.    COMPARISON:  9/4/2022    CLINICAL HISTORY: fatigue    FINDINGS:    Single frontal view of the chest demonstrates the lungs to be clear. The   cardiomediastinal silhouette is normal. No acute osseous abnormalities.    IMPRESSION: No acute cardiopulmonary disease process.            Vital Signs Last 24 Hrs  T(C): 37.1 (12 Sep 2022 05:39), Max: 37.7 (11 Sep 2022 20:59)  T(F): 98.8 (12 Sep 2022 05:39), Max: 99.8 (11 Sep 2022 20:59)  HR: 87 (12 Sep 2022 05:39) (87 - 100)  BP: 112/73 (12 Sep 2022 05:39) (109/67 - 118/71)  BP(mean): --  RR: 15 (12 Sep 2022 05:39) (10 - 16)  SpO2: 100% (12 Sep 2022 05:39) (99% - 100%)    Parameters below as of 12 Sep 2022 05:39  Patient On (Oxygen Delivery Method): BiPAP/CPAP            REVIEW OF SYSTEMS:   per HPI           Physical Exam:  on contact ESBL/ECOLI urine isolation , 51 y o man lying in semi Hahn's position , awake , alert , no complaints      Neurologic Exam:    Alert and oriented  x 3     Motor Exam:    Right UE:               no focal weakness ,  > 3+/5 throughout                                 Left UE:                 no focal weakness,  > 3+/5 throughout         Right LE:                no focal weakness,  > 3+/5 throughout     Left LE:                  no focal weakness,  > 3+/5 throughout           Sensation:         intact to light touch x 4 extremities                        DTR :                     biceps/brachioradialis : equal                                              patella/ankle : equal      Gait :  not tested              PM&R Impression : admitted for TME/sepsis/uti     Recommendations / Plan :     1) Physical / Occupational therapy focusing on therapeutic exercises , equipment evaluation , bed mobility/transfer out of bed evaluation , progressive ambulation with assistive devices prn .    2) Anticipated Disposition Plan / Recs  :   d/c home/shelter

## 2022-09-12 NOTE — DISCHARGE NOTE PROVIDER - CARE PROVIDER_API CALL
Re Ling)  Internal Medicine  [address], [address] [place] [address]  Phone: [phone]  Fax: [phone]  Follow Up Time: 1 week   Re Ling)  Internal Medicine  [address], [address] [place] [address]  Phone: [phone]  Fax: [phone]  Follow Up Time: 1 week   Re Ling)  Internal Medicine  [address], [address] [place] [address]  Phone: [phone]  Fax: [phone]  Follow Up Time: 1 week   Re Ling)  Internal Medicine  [address], [address] [place] [address]  Phone: [phone]  Fax: [phone]  Scheduled Appointment: 09/22/2022 01:30 PM   Re Ling)  Internal Medicine  [address], [address] [place] [address]  Phone: [phone]  Fax: [phone]  Scheduled Appointment: 09/22/2022 01:30 PM   Re Ling)  Internal Medicine  [address], [address] [place] [address]  Phone: [phone]  Fax: [phone]  Scheduled Appointment: 09/22/2022 01:30 PM   Re Ling)  Internal Medicine  [address], [address] [place] [address]  Phone: [phone]  Fax: [phone]  Scheduled Appointment: 09/22/2022 01:30 PM    Mo Barahona)  Gastroenterology; Internal Medicine  [address], [place] [address]  Phone: [phone]  Fax: [phone]  Scheduled Appointment: 09/23/2022 01:00 PM   Re Ling)  Internal Medicine  [address], [address] [place] [address]  Phone: [phone]  Fax: [phone]  Scheduled Appointment: 09/22/2022 01:30 PM    Mo Barahona)  Gastroenterology; Internal Medicine  [address], [place] [address]  Phone: [phone]  Fax: [phone]  Scheduled Appointment: 09/23/2022 01:00 PM   Re Ling)  Internal Medicine  [address], [address] [place] [address]  Phone: [phone]  Fax: [phone]  Scheduled Appointment: 09/22/2022 01:30 PM    Mo Barahona)  Gastroenterology; Internal Medicine  [address], [place] [address]  Phone: [phone]  Fax: [phone]  Scheduled Appointment: 09/23/2022 01:00 PM

## 2022-09-12 NOTE — DISCHARGE NOTE PROVIDER - NSDCCPCAREPLAN_GEN_ALL_CORE_FT
PRINCIPAL DISCHARGE DIAGNOSIS  Diagnosis: Spontaneous bacterial peritonitis  Assessment and Plan of Treatment:       SECONDARY DISCHARGE DIAGNOSES  Diagnosis: Cirrhosis  Assessment and Plan of Treatment:     Diagnosis: Severe sepsis with septic shock  Assessment and Plan of Treatment:      PRINCIPAL DISCHARGE DIAGNOSIS  Diagnosis: Spontaneous bacterial peritonitis  Assessment and Plan of Treatment: You were admitted to the hospital for an infection in your abdomen. This infection spread to your blood and you went into septic shock, this is when an infection causes your blood pressure to drop so much that you need medications in order to maintain your blood pressure. You were also treated with antibiotics and your condition improved. This is a complication of your underlying cirrhosis of the liver. This is a long-term (chronic) problem. It occurs when liver tissue is destroyed and replaced by scar tissue. Cirrhosis is likely to occur if you have a history of long-term alcohol abuse. Cirrhosis can't be cured. But it can be treated.  We have prescribed a medication called ciprofloxacin, this is an antibiotic that you will take every day in order to prevent further infections.   Call your healthcare provider right away if you have any of the following:  •	Fever of 100.4 F (38.0 C) or higher  •	Extreme tiredness (fatigue), weakness, or lack of appetite  •	Vomiting (with or without blood)  •	Yellowing of your skin or eyes (jaundice)  •	Itching  •	Swelling in your belly or legs  •	Black or tarry stools  •	Skin that bruises easily  •	Confusion or trouble thinking clearly         PRINCIPAL DISCHARGE DIAGNOSIS  Diagnosis: Spontaneous bacterial peritonitis  Assessment and Plan of Treatment: You were admitted to the hospital for an infection in your abdomen. This infection spread to your blood and you went into septic shock, this is when an infection causes your blood pressure to drop so much that you need medications in order to maintain your blood pressure. You were also treated with antibiotics and your condition improved. This is a complication of your underlying cirrhosis of the liver. This is a long-term (chronic) problem. It occurs when liver tissue is destroyed and replaced by scar tissue. Cirrhosis is likely to occur if you have a history of long-term alcohol abuse. Cirrhosis can't be cured. But it can be treated.  We have prescribed a medication called ciprofloxacin, this is an antibiotic that you will take every day in order to prevent further infections.   Call your healthcare provider right away if you have any of the following:  •	Fever of 100.4 F (38.0 C) or higher  •	Extreme tiredness (fatigue), weakness, or lack of appetite  •	Vomiting (with or without blood)  •	Yellowing of your skin or eyes (jaundice)  •	Itching  •	Swelling in your belly or legs  •	Black or tarry stools  •	Skin that bruises easily  •	Confusion or trouble thinking clearly        SECONDARY DISCHARGE DIAGNOSES  Diagnosis: Bacteremia due to Escherichia coli  Assessment and Plan of Treatment: The infection in your abdomen spread to your blood, and caused you to have bacteria in the blood or "bacteremia." This is treated with antibiotics. After we gave you antibiotics, we tested more blood to make sure you no longer had any bacteria and you have no succesfully cleared the bacteria from your blood.   As above, call your healthcare provider right away if you have any of the following:  •	Fever of 100.4 F (38.0 C) or higher  •	Extreme tiredness (fatigue), weakness, or lack of appetite

## 2022-09-12 NOTE — DISCHARGE NOTE PROVIDER - HOSPITAL COURSE
50 y/o man with PMH significant for cirrhosis, DM with previously untreated ESBL UTI, presenting with rigors, AMS, fever and hemodynamic instability with elevated WBC count likely due to multiorgan failure 2/2 untreated ESBL UTI and E.Coli bacteremia.      Septic shock 2/2 E coli bacteremia  Likely source UTI (ESBL)  Required levophed, weaned with midodrine.    Ertapenem 1g IV q 24h treatment for 2 weeks  Repeat cultures negative    UTI due to extended-spectrum beta lactamase (ESBL) producing Escherichia coli.   From previous ED admission, left untreated. See above      Cirrhosis.    Ab ultrasound from previous admission w/ cirrhosis: PT/INR slightly elevated.   - C/w lactulose q8  - Will need GI follow up       Peptic ulcer disease.   PPI 40 mg qd.    Anemia.   Hx of alcoholism, cirrhosis, malnourished   plan:  - c/w multivitamin, folic acid.       50 y/o man with PMH significant for cirrhosis, DM with previously untreated ESBL UTI, presenting with rigors, AMS, fever and hemodynamic instability with elevated WBC admitted for septic shock secondary to E.coli bacteremia 2/2 SBP.     Septic shock 2/2 E coli bacteremia 2/2 SBP  Patient presented with septic shock w/ bc +E.coli, requiring ICU stay w/levophed. Paracentesis with 900 cc removed, inconclusive for SBP 2/2 antibiotic tx prior. No prior history of SBP, but multiple paracentesis at other hospitals in the past.   Patient treated with total XXXXX day course of ceftriazone 2g IV     Alcoholic cirrhosis.    Ab ultrasound from previous admission w/ cirrhosis: PT/INR slightly elevated. CT confirmed.   - C/w lactulose q8  - Dc on ciprofloxacin ___ for prophylaxis   - f/u GI, will need outpatient optimization of therapy    Peptic ulcer disease.   PPI 40 mg qd.    Anemia.   Hx of alcoholism, cirrhosis, malnourished   plan:  - c/w multivitamin, folic acid on discharge     Physical exam at discharge:    New medications on discharge: ciprofloxacin            52 y/o man with PMH significant for cirrhosis, DM with previously untreated ESBL UTI, presenting with rigors, AMS, fever and hemodynamic instability with elevated WBC admitted for septic shock secondary to E.coli bacteremia 2/2 SBP.     Septic shock 2/2 E coli bacteremia 2/2 SBP  Patient presented with septic shock w/ bc +E.coli, requiring ICU stay w/levophed. Paracentesis with 900 cc removed, inconclusive for SBP 2/2 antibiotic tx prior. No prior history of SBP, but multiple paracentesis at other hospitals in the past.   Patient treated with total XXXXX day course of ceftriazone 2g IV     Alcoholic cirrhosis.    Ab ultrasound from previous admission w/ cirrhosis: PT/INR slightly elevated. CT confirmed.   - C/w lactulose q8  - Dc on ciprofloxacin ___ for prophylaxis   - f/u GI, will need outpatient optimization of therapy    Peptic ulcer disease.   PPI 40 mg qd.    Anemia.   Hx of alcoholism, cirrhosis, malnourished   plan:  - c/w multivitamin, folic acid on discharge     Physical exam at discharge:    New medications on discharge: ciprofloxacin            50 y/o man with PMH significant for cirrhosis, DM with previously untreated ESBL UTI, presenting with rigors, AMS, fever and hemodynamic instability with elevated WBC admitted for septic shock secondary to E.coli bacteremia 2/2 SBP.     Septic shock 2/2 E coli bacteremia 2/2 SBP  Patient presented with septic shock w/ bc +E.coli, requiring ICU stay w/levophed. Paracentesis with 900 cc removed, inconclusive for SBP 2/2 antibiotic tx prior. No prior history of SBP, but multiple paracentesis at other hospitals in the past.   Patient treated with total 7 day course of ceftriaxone 2g IV     Alcoholic cirrhosis.    Ab ultrasound from previous admission w/ cirrhosis: PT/INR slightly elevated. CT confirmed.   - C/w lactulose q8  - Dc on ciprofloxacin 500 mg qd for prophylaxis   - f/u GI, will need outpatient optimization of therapy    Peptic ulcer disease.   Previous history of, with no concern for GI bleed during hospitalization.   PPI 40 mg qd.  Sulcrafate 1 g qid    Anemia.   Hx of alcoholism, cirrhosis, malnourished   plan:  - c/w multivitamin, folic acid on discharge     Physical exam at discharge:  General: NAD, conversive, loose skin 2/2 weight loss from bariatric surgery   Head: NC/AT; MMM; PERRL; EOMI; missing teeth   Neck: Supple; no JVD  Respiratory: CTAB  Cardiovascular: Regular rhythm/rate; S1/S2+, no murmurs, rubs gallops   Gastrointestinal: Soft; non specific abdominal tenderness though much improved from previously  Extremities: WWP; no edema/cyanosis  Neurological: A&Ox3, CNII-XII grossly intact; no obvious focal deficits      New medications on discharge: ciprofloxacin            52 y/o man with PMH significant for cirrhosis, DM with previously untreated ESBL UTI, presenting with rigors, AMS, fever and hemodynamic instability with elevated WBC admitted for septic shock secondary to E.coli bacteremia 2/2 SBP.     Septic shock 2/2 E coli bacteremia 2/2 SBP  Patient presented with septic shock w/ bc +E.coli, requiring ICU stay w/levophed. Paracentesis with 900 cc removed, inconclusive for SBP 2/2 antibiotic tx prior. No prior history of SBP, but multiple paracentesis at other hospitals in the past.   Patient treated with total 7 day course of ceftriaxone 2g IV     Alcoholic cirrhosis.    Ab ultrasound from previous admission w/ cirrhosis: PT/INR slightly elevated. CT confirmed.   - C/w lactulose q8  - Dc on ciprofloxacin 500 mg qd for prophylaxis   - f/u GI, will need outpatient optimization of therapy    Peptic ulcer disease.   Previous history of, with no concern for GI bleed during hospitalization.   PPI 40 mg qd.  Sulcrafate 1 g qid    Anemia.   Hx of alcoholism, cirrhosis, malnourished   plan:  - c/w multivitamin, folic acid on discharge     Physical exam at discharge:  General: NAD, conversive, loose skin 2/2 weight loss from bariatric surgery   Head: NC/AT; MMM; PERRL; EOMI; missing teeth   Neck: Supple; no JVD  Respiratory: CTAB  Cardiovascular: Regular rhythm/rate; S1/S2+, no murmurs, rubs gallops   Gastrointestinal: Soft; non specific abdominal tenderness though much improved from previously  Extremities: WWP; no edema/cyanosis  Neurological: A&Ox3, CNII-XII grossly intact; no obvious focal deficits      New medications on discharge: ciprofloxacin            52 y/o man with PMH significant for cirrhosis, DM with previously untreated ESBL UTI, presenting with rigors, AMS, fever and hemodynamic instability with elevated WBC admitted for septic shock secondary to E.coli (not ESBL) bacteremia 2/2 SBP.     Septic shock 2/2 E coli bacteremia 2/2 SBP  Patient presented with septic shock w/ bc +E.coli, requiring ICU stay w/levophed. Paracentesis with 900 cc removed, inconclusive for SBP 2/2 antibiotic tx prior. No prior history of SBP, but multiple paracentesis at other hospitals in the past.   Patient treated with total 7 day course of ceftriaxone 2g IV     Alcoholic cirrhosis.    Ab ultrasound from previous admission w/ cirrhosis: PT/INR slightly elevated. CT confirmed.   - C/w lactulose q8  - Dc on ciprofloxacin 500 mg qd for SBP prophylaxis   - f/u GI, will need outpatient optimization of therapy  - Patient previously on Lasix 40 mg, spironolactone. Had not been compliant with medications prior to hospitalization.   - No records of endoscopy found.   - Dc with lactulose 20 g q12 for 2-3 BM per day     Peptic ulcer disease.   Previous history of, with no concern for GI bleed during hospitalization. Per patient, episode several months ago. Unknown intervention.   - PPI 40 mg qd.  - Sulcrafate 1 g qid    Anemia.   Hx of alcoholism, cirrhosis, malnourished   plan:  - c/w multivitamin, folic acid on discharge     Physical exam at discharge:  General: NAD, conversive, loose skin 2/2 weight loss from bariatric surgery   Head: NC/AT; MMM; PERRL; EOMI; missing teeth   Neck: Supple; no JVD  Respiratory: CTAB  Cardiovascular: Regular rhythm/rate; S1/S2+, no murmurs, rubs gallops   Gastrointestinal: Soft; non specific abdominal tenderness though much improved from previously  Extremities: WWP; no edema/cyanosis  Neurological: A&Ox3, CNII-XII grossly intact; no obvious focal deficits      New medications on discharge: Ciprofloxacin 500 mg qd    CTAP on 09/13.2022:   FINDINGS:  LOWER CHEST: Within normal limits.    LIVER: Cirrhotic morphology.  BILE DUCTS: Normal caliber.  GALLBLADDER: Within normal limits.  SPLEEN: Splenomegaly, 16.4 cm  PANCREAS: Within normal limits.  ADRENALS: Within normal limits.  KIDNEYS/URETERS: Within normal limits.    BLADDER: Within normal limits.  REPRODUCTIVE ORGANS: Within normal limits    BOWEL: No bowel obstruction or mural wall thickening. Appendix is unremarkable.  PERITONEUM: Increased previously small now moderate volume ascites.    RETROPERITONEUM/LYMPH NODES: Unchanged few nonspecific gastrohepatic and periportal nodes.  ABDOMINAL WALL: Mild edema.  BONES: No suspicious lesion.    IMPRESSION:    1. Since September 4, 2022 increased ascites.  2. Hepatic cirrhosis and splenomegaly.     50 y/o man with PMH significant for cirrhosis, DM with previously untreated ESBL UTI, presenting with rigors, AMS, fever and hemodynamic instability with elevated WBC admitted for septic shock secondary to E.coli (not ESBL) bacteremia 2/2 SBP.     Septic shock 2/2 E coli bacteremia 2/2 SBP  Patient presented with septic shock w/ bc +E.coli, requiring ICU stay w/levophed. Paracentesis with 900 cc removed, inconclusive for SBP 2/2 antibiotic tx prior. No prior history of SBP, but multiple paracentesis at other hospitals in the past.   Patient treated with total 7 day course of ceftriaxone 2g IV     Alcoholic cirrhosis.    Ab ultrasound from previous admission w/ cirrhosis: PT/INR slightly elevated. CT confirmed.   - C/w lactulose q8  - Dc on ciprofloxacin 500 mg qd for SBP prophylaxis   - f/u GI, will need outpatient optimization of therapy  - Patient previously on Lasix 40 mg, spironolactone. Had not been compliant with medications prior to hospitalization.   - No records of endoscopy found.   - Dc with lactulose 20 g q12 for 2-3 BM per day     Peptic ulcer disease.   Previous history of, with no concern for GI bleed during hospitalization. Per patient, episode several months ago. Unknown intervention.   - PPI 40 mg qd.  - Sulcrafate 1 g qid    Anemia.   Hx of alcoholism, cirrhosis, malnourished   plan:  - c/w multivitamin, folic acid on discharge     Physical exam at discharge:  General: NAD, conversive, loose skin 2/2 weight loss from bariatric surgery   Head: NC/AT; MMM; PERRL; EOMI; missing teeth   Neck: Supple; no JVD  Respiratory: CTAB  Cardiovascular: Regular rhythm/rate; S1/S2+, no murmurs, rubs gallops   Gastrointestinal: Soft; non specific abdominal tenderness though much improved from previously  Extremities: WWP; no edema/cyanosis  Neurological: A&Ox3, CNII-XII grossly intact; no obvious focal deficits      New medications on discharge: Ciprofloxacin 500 mg qd    CTAP on 09/13.2022:   FINDINGS:  LOWER CHEST: Within normal limits.    LIVER: Cirrhotic morphology.  BILE DUCTS: Normal caliber.  GALLBLADDER: Within normal limits.  SPLEEN: Splenomegaly, 16.4 cm  PANCREAS: Within normal limits.  ADRENALS: Within normal limits.  KIDNEYS/URETERS: Within normal limits.    BLADDER: Within normal limits.  REPRODUCTIVE ORGANS: Within normal limits    BOWEL: No bowel obstruction or mural wall thickening. Appendix is unremarkable.  PERITONEUM: Increased previously small now moderate volume ascites.    RETROPERITONEUM/LYMPH NODES: Unchanged few nonspecific gastrohepatic and periportal nodes.  ABDOMINAL WALL: Mild edema.  BONES: No suspicious lesion.    IMPRESSION:    1. Since September 4, 2022 increased ascites.  2. Hepatic cirrhosis and splenomegaly.     50 y/o man with PMH significant for cirrhosis, DM with previously untreated ESBL UTI, presenting with rigors, AMS, fever and hemodynamic instability with elevated WBC admitted for septic shock secondary to E.coli (not ESBL) bacteremia 2/2 SBP.     Septic shock 2/2 E coli bacteremia 2/2 SBP  Patient presented with septic shock w/ bc +E.coli, requiring ICU stay w/levophed. Paracentesis with 900 cc removed, inconclusive for SBP 2/2 antibiotic tx prior. No prior history of SBP, but multiple paracentesis at other hospitals in the past.   Patient treated with total 7 day course of ceftriaxone 2g IV     Alcoholic cirrhosis.    Ab ultrasound from previous admission w/ cirrhosis: PT/INR slightly elevated. CT confirmed.   - C/w lactulose q8  - Dc on ciprofloxacin 500 mg qd for SBP prophylaxis   - f/u GI, will need outpatient optimization of therapy  - Patient previously on Lasix 40 mg, spironolactone. Had not been compliant with medications prior to hospitalization.   - No records of endoscopy found.   - Dc with lactulose 20 g q12 for 2-3 BM per day     Peptic ulcer disease.   Previous history of, with no concern for GI bleed during hospitalization. Per patient, episode several months ago. Unknown intervention.   - PPI 40 mg qd.  - Sulcrafate 1 g qid    Anemia.   Hx of alcoholism, cirrhosis, malnourished   plan:  - c/w multivitamin, folic acid on discharge     Chronic Knee pain  - patient reports that he has chronically been on Oxycodone 10mg prescribed by Dr Fox who has recently retired.  He was maintained on this dose since ICU stepdown and discussed that he can be given a 5 day supply but will require follow up for outpatient taper off the medication vs new establishment with pain management  - iSTOP confirmed no recent prescriptions       Physical exam at discharge:  General: NAD, conversive, loose skin 2/2 weight loss from bariatric surgery   Head: NC/AT; MMM; PERRL; EOMI; missing teeth   Neck: Supple; no JVD  Respiratory: CTAB  Cardiovascular: Regular rhythm/rate; S1/S2+, no murmurs, rubs gallops   Gastrointestinal: Soft; non specific abdominal tenderness though much improved from previously  Extremities: WWP; no edema/cyanosis  Neurological: A&Ox3, CNII-XII grossly intact; no obvious focal deficits      New medications on discharge: Ciprofloxacin 500 mg qd    CTAP on 09/13.2022:   FINDINGS:  LOWER CHEST: Within normal limits.    LIVER: Cirrhotic morphology.  BILE DUCTS: Normal caliber.  GALLBLADDER: Within normal limits.  SPLEEN: Splenomegaly, 16.4 cm  PANCREAS: Within normal limits.  ADRENALS: Within normal limits.  KIDNEYS/URETERS: Within normal limits.    BLADDER: Within normal limits.  REPRODUCTIVE ORGANS: Within normal limits    BOWEL: No bowel obstruction or mural wall thickening. Appendix is unremarkable.  PERITONEUM: Increased previously small now moderate volume ascites.    RETROPERITONEUM/LYMPH NODES: Unchanged few nonspecific gastrohepatic and periportal nodes.  ABDOMINAL WALL: Mild edema.  BONES: No suspicious lesion.    IMPRESSION:    1. Since September 4, 2022 increased ascites.  2. Hepatic cirrhosis and splenomegaly.     52 y/o man with PMH significant for cirrhosis, DM with previously untreated ESBL UTI, presenting with rigors, AMS, fever and hemodynamic instability with elevated WBC admitted for septic shock secondary to E.coli (not ESBL) bacteremia 2/2 SBP.     Septic shock 2/2 E coli bacteremia 2/2 SBP  Patient presented with septic shock w/ bc +E.coli, requiring ICU stay w/levophed. Paracentesis with 900 cc removed, inconclusive for SBP 2/2 antibiotic tx prior. No prior history of SBP, but multiple paracentesis at other hospitals in the past.   Patient treated with total 7 day course of ceftriaxone 2g IV     Alcoholic cirrhosis.    Ab ultrasound from previous admission w/ cirrhosis: PT/INR slightly elevated. CT confirmed.   - C/w lactulose q8  - Dc on ciprofloxacin 500 mg qd for SBP prophylaxis   - f/u GI, will need outpatient optimization of therapy  - Patient previously on Lasix 40 mg, spironolactone. Had not been compliant with medications prior to hospitalization.   - No records of endoscopy found.   - Dc with lactulose 20 g q12 for 2-3 BM per day     Peptic ulcer disease.   Previous history of, with no concern for GI bleed during hospitalization. Per patient, episode several months ago. Unknown intervention.   - PPI 40 mg qd.  - Sulcrafate 1 g qid    Anemia.   Hx of alcoholism, cirrhosis, malnourished   plan:  - c/w multivitamin, folic acid on discharge     Chronic Knee pain  - patient reports that he has chronically been on Oxycodone 10mg prescribed by Dr Fox who has recently retired.  He was maintained on this dose since ICU stepdown and discussed that he can be given a 5 day supply but will require follow up for outpatient taper off the medication vs new establishment with pain management  - iSTOP confirmed no recent prescriptions       Physical exam at discharge:  General: NAD, conversive, loose skin 2/2 weight loss from bariatric surgery   Head: NC/AT; MMM; PERRL; EOMI; missing teeth   Neck: Supple; no JVD  Respiratory: CTAB  Cardiovascular: Regular rhythm/rate; S1/S2+, no murmurs, rubs gallops   Gastrointestinal: Soft; non specific abdominal tenderness though much improved from previously  Extremities: WWP; no edema/cyanosis  Neurological: A&Ox3, CNII-XII grossly intact; no obvious focal deficits      New medications on discharge: Ciprofloxacin 500 mg qd    CTAP on 09/13.2022:   FINDINGS:  LOWER CHEST: Within normal limits.    LIVER: Cirrhotic morphology.  BILE DUCTS: Normal caliber.  GALLBLADDER: Within normal limits.  SPLEEN: Splenomegaly, 16.4 cm  PANCREAS: Within normal limits.  ADRENALS: Within normal limits.  KIDNEYS/URETERS: Within normal limits.    BLADDER: Within normal limits.  REPRODUCTIVE ORGANS: Within normal limits    BOWEL: No bowel obstruction or mural wall thickening. Appendix is unremarkable.  PERITONEUM: Increased previously small now moderate volume ascites.    RETROPERITONEUM/LYMPH NODES: Unchanged few nonspecific gastrohepatic and periportal nodes.  ABDOMINAL WALL: Mild edema.  BONES: No suspicious lesion.    IMPRESSION:    1. Since September 4, 2022 increased ascites.  2. Hepatic cirrhosis and splenomegaly.

## 2022-09-12 NOTE — DISCHARGE NOTE PROVIDER - NPI NUMBER (FOR [employer] USE ONLY) :
[[id number]] [[id number]] [[id number]] [[id number]],[[id number]] [[id number]] [[id number]]

## 2022-09-12 NOTE — CONSULT NOTE ADULT - ASSESSMENT
per Internal Medicine    52 y/o man with PMH significant for cirrhosis, DM with previously untreated ESBL UTI, presenting with rigors, AMS, fever and hemodynamic instability with elevated WBC count likely due to multiorgan failure 2/2 UTI and bacteremia.     Problem/Plan - 1:  ·  Problem: Metabolic encephalopathy.   ·  Plan: resolved. likely 2/2 shock.    Problem/Plan - 2:  ·  Problem: Severe sepsis with septic shock.   ·  Plan: resolved. due to ESBL E coli bacteremia from complicated UTI   - c/w ertapenem  - will require midline/PICC for abx administration   - sw/IR consults.    Problem/Plan - 3:  ·  Problem: UTI due to extended-spectrum beta lactamase (ESBL) producing Escherichia coli.   ·  Plan: - F/u urine and blood cultures   - ertapenem 1g IV q 24h.    Problem/Plan - 4:  ·  Problem: Cirrhosis.   ·  Plan: - C/w lactulose q8  - Abdominal US  - holding diuretics given sepsis and midodrine, cautious resumption likely tomorrow.    Problem/Plan - 5:  ·  Problem: Anemia.   ·  Plan: Hx of alcoholism, cirrhosis, malnourished   - F/u Iron studies   - c/w multivitamin, folic acid.    Problem/Plan - 6:  ·  Problem: Peptic ulcer disease.   ·  Plan: - Start pantoprazole   - regular diet.    Problem/Plan - 7:  ·  Problem: Diabetes mellitus.   ·  Plan: A1C @6 on Sep 5th   Pt not on any meds at home  Insulin sliding scale.    Problem/Plan - 8:  ·  Problem: Prophylactic measure.   ·  Plan: F: tolerating PO, ondansetron for Nausea (PRN) QTc was 450   E: Replete PRN   DVT propylaxis: SCDs  GI prophylaxis: Pantoprazole.   per Internal Medicine    50 y/o man with PMH significant for cirrhosis, DM with previously untreated ESBL UTI, presenting with rigors, AMS, fever and hemodynamic instability with elevated WBC count likely due to multiorgan failure 2/2 UTI and bacteremia.     Problem/Plan - 1:  ·  Problem: Metabolic encephalopathy.   ·  Plan: resolved. likely 2/2 shock.    Problem/Plan - 2:  ·  Problem: Severe sepsis with septic shock.   ·  Plan: resolved. due to ESBL E coli bacteremia from complicated UTI   - c/w ertapenem  - will require midline/PICC for abx administration   - sw/IR consults.    Problem/Plan - 3:  ·  Problem: UTI due to extended-spectrum beta lactamase (ESBL) producing Escherichia coli.   ·  Plan: - F/u urine and blood cultures   - ertapenem 1g IV q 24h.    Problem/Plan - 4:  ·  Problem: Cirrhosis.   ·  Plan: - C/w lactulose q8  - Abdominal US  - holding diuretics given sepsis and midodrine, cautious resumption likely tomorrow.    Problem/Plan - 5:  ·  Problem: Anemia.   ·  Plan: Hx of alcoholism, cirrhosis, malnourished   - F/u Iron studies   - c/w multivitamin, folic acid.    Problem/Plan - 6:  ·  Problem: Peptic ulcer disease.   ·  Plan: - Start pantoprazole   - regular diet.    Problem/Plan - 7:  ·  Problem: Diabetes mellitus.   ·  Plan: A1C @6 on Sep 5th   Pt not on any meds at home  Insulin sliding scale.    Problem/Plan - 8:  ·  Problem: Prophylactic measure.   ·  Plan: F: tolerating PO, ondansetron for Nausea (PRN) QTc was 450   E: Replete PRN   DVT propylaxis: SCDs  GI prophylaxis: Pantoprazole.

## 2022-09-12 NOTE — PROGRESS NOTE ADULT - SUBJECTIVE AND OBJECTIVE BOX
O/N Events: Wore CPAP overnight, per patient wears CPAP at home.     Subjective/ROS: Patient seen and examined at bedside. Complaints of chills overnight with nausea and vomittingx1, currently endorses slight abdominal pain, worst in RUQ. Had several formed BM overnight. Denies chest pain, SOB, changes in bowel/urinary habits. 12pt ROS otherwise negative.    VITALS  Vital Signs Last 24 Hrs  T(C): 37.1 (12 Sep 2022 12:45), Max: 37.7 (11 Sep 2022 20:59)  T(F): 98.7 (12 Sep 2022 12:45), Max: 99.8 (11 Sep 2022 20:59)  HR: 91 (12 Sep 2022 12:45) (87 - 99)  BP: 121/74 (12 Sep 2022 12:45) (109/67 - 121/74)  BP(mean): --  RR: 15 (12 Sep 2022 05:39) (10 - 16)  SpO2: 100% (12 Sep 2022 05:39) (99% - 100%)    Parameters below as of 12 Sep 2022 05:39  Patient On (Oxygen Delivery Method): BiPAP/CPAP        CAPILLARY BLOOD GLUCOSE      POCT Blood Glucose.: 229 mg/dL (12 Sep 2022 12:24)  POCT Blood Glucose.: 172 mg/dL (12 Sep 2022 09:34)  POCT Blood Glucose.: 189 mg/dL (12 Sep 2022 08:10)  POCT Blood Glucose.: 156 mg/dL (11 Sep 2022 22:23)  POCT Blood Glucose.: 139 mg/dL (11 Sep 2022 17:58)      PHYSICAL EXAM  General: NAD, conversive, making bed when walked in  Head: NC/AT; MMM; PERRL; EOMI; missing teeth   Neck: Supple; no JVD  Respiratory: Slight expiratory wheezes in Left upper lung field  Cardiovascular: Regular rhythm/rate; S1/S2+, no murmurs, rubs gallops   Gastrointestinal: Soft; tender to palpation in RUQ  Extremities: WWP; no edema/cyanosis  Neurological: A&Ox3, CNII-XII grossly intact; no obvious focal deficits    MEDICATIONS  (STANDING):  dextrose 5%. 1000 milliLiter(s) (50 mL/Hr) IV Continuous <Continuous>  dextrose 5%. 1000 milliLiter(s) (100 mL/Hr) IV Continuous <Continuous>  dextrose 50% Injectable 25 Gram(s) IV Push once  dextrose 50% Injectable 12.5 Gram(s) IV Push once  dextrose 50% Injectable 25 Gram(s) IV Push once  ertapenem  IVPB 1000 milliGRAM(s) IV Intermittent every 24 hours  folic acid 1 milliGRAM(s) Oral daily  glucagon  Injectable 1 milliGRAM(s) IntraMuscular once  heparin   Injectable 7500 Unit(s) SubCutaneous every 8 hours  insulin lispro (ADMELOG) corrective regimen sliding scale   SubCutaneous Before meals and at bedtime  lactulose Syrup 20 Gram(s) Oral every 12 hours  midodrine 10 milliGRAM(s) Oral every 8 hours  multivitamin/minerals 1 Tablet(s) Oral daily  nicotine -  14 mG/24Hr(s) Patch 1 Patch Transdermal daily  oxyCODONE    IR 10 milliGRAM(s) Oral three times a day  pantoprazole    Tablet 40 milliGRAM(s) Oral before breakfast  thiamine 100 milliGRAM(s) Oral daily    MEDICATIONS  (PRN):  dextrose Oral Gel 15 Gram(s) Oral once PRN Blood Glucose LESS THAN 70 milliGRAM(s)/deciliter      No Known Allergies      LABS                        9.2    6.35  )-----------( 111      ( 12 Sep 2022 08:33 )             28.4     09-12    130<L>  |  94<L>  |  24<H>  ----------------------------<  173<H>  4.1   |  26  |  0.93    Ca    9.7      12 Sep 2022 09:05  Phos  2.6     09-12  Mg     1.9     09-12    TPro  x   /  Alb  x   /  TBili  1.0  /  DBili  x   /  AST  x   /  ALT  x   /  AlkPhos  x   09-11    PT/INR - ( 11 Sep 2022 08:52 )   PT: 16.8 sec;   INR: 1.41                      IMAGING/EKG/ETC

## 2022-09-12 NOTE — CONSULT NOTE ADULT - ATTENDING COMMENTS
52 yo M with DM, asthma, cirrhosis, PUD, TENZIN s/p gastric bypass with E coli bacteremia.  Had been admitted 9/4 – 9/6 with weakness, abd pain, hypotension responsive to IVF.  CT with small ascites not amenable to paracentesis, UA with pyuria, culture grew ESBL-producing E coli.  He had no urinary symptoms and did not receive antibiotics during that admission.  Readmitted 9/9 with generalized abd pain, fever and rigors, no dysuria or frequency, T 102.5, , BP 88/47, WBC 10.5. He initially required pressors, off since 9/10.  UA with many WBCs, urine culture prior to antibiotics with <10K col nl urogenital christo, blood cultures with E coli susceptible to ceftriaxone (4/4 bottles).  On exam, he is nontoxic appearing with poor dentition, abd with inconsistent tenderness but mainly appears to be in lower abd, voluntary guarding, no rebound, no CVAT.  Needs imaging to look for source.  Would f/u blood culture from 9/10, obtain CT A/P w/ po and w/wo IVC, start ceftriaxone, d/c ertapenem.  Will follow with you – team 1.

## 2022-09-12 NOTE — DISCHARGE NOTE PROVIDER - CARE PROVIDERS DIRECT ADDRESSES
,[email or web address]

## 2022-09-13 LAB
ANION GAP SERPL CALC-SCNC: 10 MMOL/L — SIGNIFICANT CHANGE UP (ref 5–17)
ANISOCYTOSIS BLD QL: SLIGHT — SIGNIFICANT CHANGE UP
BASOPHILS # BLD AUTO: 0.05 K/UL — SIGNIFICANT CHANGE UP (ref 0–0.2)
BASOPHILS NFR BLD AUTO: 0.9 % — SIGNIFICANT CHANGE UP (ref 0–2)
BUN SERPL-MCNC: 17 MG/DL — SIGNIFICANT CHANGE UP (ref 7–23)
BURR CELLS BLD QL SMEAR: PRESENT — SIGNIFICANT CHANGE UP
CALCIUM SERPL-MCNC: 9.2 MG/DL — SIGNIFICANT CHANGE UP (ref 8.4–10.5)
CHLORIDE SERPL-SCNC: 94 MMOL/L — LOW (ref 96–108)
CO2 SERPL-SCNC: 26 MMOL/L — SIGNIFICANT CHANGE UP (ref 22–31)
CREAT SERPL-MCNC: 0.78 MG/DL — SIGNIFICANT CHANGE UP (ref 0.5–1.3)
EGFR: 107 ML/MIN/1.73M2 — SIGNIFICANT CHANGE UP
EOSINOPHIL # BLD AUTO: 0.09 K/UL — SIGNIFICANT CHANGE UP (ref 0–0.5)
EOSINOPHIL NFR BLD AUTO: 1.7 % — SIGNIFICANT CHANGE UP (ref 0–6)
GIANT PLATELETS BLD QL SMEAR: PRESENT — SIGNIFICANT CHANGE UP
GLUCOSE BLDC GLUCOMTR-MCNC: 134 MG/DL — HIGH (ref 70–99)
GLUCOSE BLDC GLUCOMTR-MCNC: 135 MG/DL — HIGH (ref 70–99)
GLUCOSE BLDC GLUCOMTR-MCNC: 142 MG/DL — HIGH (ref 70–99)
GLUCOSE BLDC GLUCOMTR-MCNC: 233 MG/DL — HIGH (ref 70–99)
GLUCOSE SERPL-MCNC: 155 MG/DL — HIGH (ref 70–99)
HCT VFR BLD CALC: 27.3 % — LOW (ref 39–50)
HGB BLD-MCNC: 8.8 G/DL — LOW (ref 13–17)
HYPOCHROMIA BLD QL: SLIGHT — SIGNIFICANT CHANGE UP
LYMPHOCYTES # BLD AUTO: 0.95 K/UL — LOW (ref 1–3.3)
LYMPHOCYTES # BLD AUTO: 18.1 % — SIGNIFICANT CHANGE UP (ref 13–44)
MACROCYTES BLD QL: SLIGHT — SIGNIFICANT CHANGE UP
MAGNESIUM SERPL-MCNC: 1.8 MG/DL — SIGNIFICANT CHANGE UP (ref 1.6–2.6)
MANUAL SMEAR VERIFICATION: SIGNIFICANT CHANGE UP
MCHC RBC-ENTMCNC: 27.4 PG — SIGNIFICANT CHANGE UP (ref 27–34)
MCHC RBC-ENTMCNC: 32.2 GM/DL — SIGNIFICANT CHANGE UP (ref 32–36)
MCV RBC AUTO: 85 FL — SIGNIFICANT CHANGE UP (ref 80–100)
MICROCYTES BLD QL: SLIGHT — SIGNIFICANT CHANGE UP
MONOCYTES # BLD AUTO: 0.54 K/UL — SIGNIFICANT CHANGE UP (ref 0–0.9)
MONOCYTES NFR BLD AUTO: 10.3 % — SIGNIFICANT CHANGE UP (ref 2–14)
NEUTROPHILS # BLD AUTO: 3.62 K/UL — SIGNIFICANT CHANGE UP (ref 1.8–7.4)
NEUTROPHILS NFR BLD AUTO: 69 % — SIGNIFICANT CHANGE UP (ref 43–77)
OVALOCYTES BLD QL SMEAR: SLIGHT — SIGNIFICANT CHANGE UP
PHOSPHATE SERPL-MCNC: 2.5 MG/DL — SIGNIFICANT CHANGE UP (ref 2.5–4.5)
PLAT MORPH BLD: ABNORMAL
PLATELET # BLD AUTO: 106 K/UL — LOW (ref 150–400)
POLYCHROMASIA BLD QL SMEAR: SLIGHT — SIGNIFICANT CHANGE UP
POTASSIUM SERPL-MCNC: 4 MMOL/L — SIGNIFICANT CHANGE UP (ref 3.5–5.3)
POTASSIUM SERPL-SCNC: 4 MMOL/L — SIGNIFICANT CHANGE UP (ref 3.5–5.3)
RBC # BLD: 3.21 M/UL — LOW (ref 4.2–5.8)
RBC # FLD: 15.3 % — HIGH (ref 10.3–14.5)
RBC BLD AUTO: ABNORMAL
SCHISTOCYTES BLD QL AUTO: SLIGHT — SIGNIFICANT CHANGE UP
SODIUM SERPL-SCNC: 130 MMOL/L — LOW (ref 135–145)
SPHEROCYTES BLD QL SMEAR: SLIGHT — SIGNIFICANT CHANGE UP
WBC # BLD: 5.25 K/UL — SIGNIFICANT CHANGE UP (ref 3.8–10.5)
WBC # FLD AUTO: 5.25 K/UL — SIGNIFICANT CHANGE UP (ref 3.8–10.5)

## 2022-09-13 PROCEDURE — 99233 SBSQ HOSP IP/OBS HIGH 50: CPT | Mod: GC

## 2022-09-13 PROCEDURE — 99232 SBSQ HOSP IP/OBS MODERATE 35: CPT | Mod: GC

## 2022-09-13 PROCEDURE — 74177 CT ABD & PELVIS W/CONTRAST: CPT | Mod: 26

## 2022-09-13 RX ORDER — LANOLIN ALCOHOL/MO/W.PET/CERES
3 CREAM (GRAM) TOPICAL AT BEDTIME
Refills: 0 | Status: DISCONTINUED | OUTPATIENT
Start: 2022-09-13 | End: 2022-09-20

## 2022-09-13 RX ORDER — MIDODRINE HYDROCHLORIDE 2.5 MG/1
5 TABLET ORAL EVERY 8 HOURS
Refills: 0 | Status: DISCONTINUED | OUTPATIENT
Start: 2022-09-13 | End: 2022-09-15

## 2022-09-13 RX ADMIN — OXYCODONE HYDROCHLORIDE 10 MILLIGRAM(S): 5 TABLET ORAL at 07:07

## 2022-09-13 RX ADMIN — OXYCODONE HYDROCHLORIDE 10 MILLIGRAM(S): 5 TABLET ORAL at 14:38

## 2022-09-13 RX ADMIN — Medication 4: at 12:43

## 2022-09-13 RX ADMIN — CEFTRIAXONE 100 MILLIGRAM(S): 500 INJECTION, POWDER, FOR SOLUTION INTRAMUSCULAR; INTRAVENOUS at 18:12

## 2022-09-13 RX ADMIN — Medication 1 PATCH: at 19:04

## 2022-09-13 RX ADMIN — OXYCODONE HYDROCHLORIDE 10 MILLIGRAM(S): 5 TABLET ORAL at 21:18

## 2022-09-13 RX ADMIN — HEPARIN SODIUM 7500 UNIT(S): 5000 INJECTION INTRAVENOUS; SUBCUTANEOUS at 14:08

## 2022-09-13 RX ADMIN — Medication 1 PATCH: at 12:49

## 2022-09-13 RX ADMIN — Medication 100 MILLIGRAM(S): at 12:50

## 2022-09-13 RX ADMIN — Medication 1 TABLET(S): at 12:51

## 2022-09-13 RX ADMIN — LACTULOSE 20 GRAM(S): 10 SOLUTION ORAL at 18:12

## 2022-09-13 RX ADMIN — HEPARIN SODIUM 7500 UNIT(S): 5000 INJECTION INTRAVENOUS; SUBCUTANEOUS at 21:18

## 2022-09-13 RX ADMIN — PANTOPRAZOLE SODIUM 40 MILLIGRAM(S): 20 TABLET, DELAYED RELEASE ORAL at 07:08

## 2022-09-13 RX ADMIN — HEPARIN SODIUM 7500 UNIT(S): 5000 INJECTION INTRAVENOUS; SUBCUTANEOUS at 07:07

## 2022-09-13 RX ADMIN — MIDODRINE HYDROCHLORIDE 5 MILLIGRAM(S): 2.5 TABLET ORAL at 18:12

## 2022-09-13 RX ADMIN — Medication 1 MILLIGRAM(S): at 12:50

## 2022-09-13 RX ADMIN — OXYCODONE HYDROCHLORIDE 10 MILLIGRAM(S): 5 TABLET ORAL at 14:08

## 2022-09-13 RX ADMIN — Medication 1 PATCH: at 11:00

## 2022-09-13 RX ADMIN — OXYCODONE HYDROCHLORIDE 10 MILLIGRAM(S): 5 TABLET ORAL at 21:19

## 2022-09-13 RX ADMIN — MIDODRINE HYDROCHLORIDE 10 MILLIGRAM(S): 2.5 TABLET ORAL at 01:20

## 2022-09-13 RX ADMIN — MIDODRINE HYDROCHLORIDE 5 MILLIGRAM(S): 2.5 TABLET ORAL at 09:03

## 2022-09-13 RX ADMIN — Medication 3 MILLIGRAM(S): at 22:29

## 2022-09-13 RX ADMIN — DIATRIZOATE MEGLUMINE 30 MILLILITER(S): 180 INJECTION, SOLUTION INTRAVESICAL at 12:48

## 2022-09-13 NOTE — PROGRESS NOTE ADULT - ATTENDING COMMENTS
50 y/o man with PMH significant for cirrhosis, DM who presented with septic shock and found to have E coli bacteremia    #Ecoli bacteremia  - previous admit with UA + for ESBL Ecoli, was not treated at that time as patient asymptomatic  - Blood cultures are E coli but NOT ESBL  - ID consulted, appreciate recs  - stopped Ertapenem and started CTX 2gm  - CT A/P with increased ascites, no other sources of infection  - will discuss with IR possible paracentesis in AM    #septic shock  - patient presented with sepsis due to E coli infection requiring levophed now improving  - Abx as above  - Weaned midodrine to 5 q8 today from 10, will wean as tolerated  - hold all BP medications    #Cirrhosis  - due to EtOH abuse, no appreciable ascites on exam although limited due to habitus with excess skin due to patient's weight loss  - patient previously on Lasix and Spironolactone, now held due to hypotension  - c/w outpatient follow up with [facility].  Will likely not be able to restart diuretic therapy during this admission  - c/w lactulose  - no known hx of SBP, although patient poor historian. No amenable pocket for paracentesis in last admission.  Increased ascites will re-eval  - c/w DVT ppx with heparin, held for possible para    #LINNETTE  - patient presented with Cr 1.7 from dc Cr 1.2, now downtrend to 0.9. Resolved  - was likely pre renal due to sepsis    #Anemia  - f/u iron studies no signs of bleeding.  Likely due to chronic inflammation    #Thrombocytopenia  - stable, no signs of bleeding    #DM - c/w ISS  #HTN - now hypotensive, hold all BP meds  #Tobacco abuse - nicotine replacement    Remainder of plan as above 52 y/o man with PMH significant for cirrhosis, DM who presented with septic shock and found to have E coli bacteremia    #Ecoli bacteremia  - previous admit with UA + for ESBL Ecoli, was not treated at that time as patient asymptomatic  - Blood cultures are E coli but NOT ESBL  - ID consulted, appreciate recs  - stopped Ertapenem and started CTX 2gm  - CT A/P with increased ascites, no other sources of infection  - will discuss with IR possible paracentesis in AM    #septic shock  - patient presented with sepsis due to E coli infection requiring levophed now improving  - Abx as above  - Weaned midodrine to 5 q8 today from 10, will wean as tolerated  - hold all BP medications    #Cirrhosis  - due to EtOH abuse, no appreciable ascites on exam although limited due to habitus with excess skin due to patient's weight loss  - patient previously on Lasix and Spironolactone, now held due to hypotension  - c/w outpatient follow up with [facility].  Will likely not be able to restart diuretic therapy during this admission  - c/w lactulose  - no known hx of SBP, although patient poor historian. No amenable pocket for paracentesis in last admission.  Increased ascites will re-eval  - c/w DVT ppx with heparin, held for possible para    #LINNETTE  - patient presented with Cr 1.7 from dc Cr 1.2, now downtrend to 0.9. Resolved  - was likely pre renal due to sepsis    #Anemia  - f/u iron studies no signs of bleeding.  Likely due to chronic inflammation    #Thrombocytopenia  - stable, no signs of bleeding    #DM - c/w ISS  #HTN - now hypotensive, hold all BP meds  #Tobacco abuse - nicotine replacement    Remainder of plan as above 50 y/o man with PMH significant for cirrhosis, DM who presented with septic shock and found to have E coli bacteremia    #Ecoli bacteremia  - previous admit with UA + for ESBL Ecoli, was not treated at that time as patient asymptomatic  - Blood cultures are E coli but NOT ESBL  - ID consulted, appreciate recs  - stopped Ertapenem and started CTX 2gm  - CT A/P with increased ascites, no other sources of infection  - will discuss with IR possible paracentesis in AM    #septic shock  - patient presented with sepsis due to E coli infection requiring levophed now improving  - Abx as above  - Weaned midodrine to 5 q8 today from 10, will wean as tolerated  - hold all BP medications    #Cirrhosis  - due to EtOH abuse, no appreciable ascites on exam although limited due to habitus with excess skin due to patient's weight loss  - patient previously on Lasix and Spironolactone, now held due to hypotension  - c/w outpatient follow up with [facility].  Will likely not be able to restart diuretic therapy during this admission  - c/w lactulose  - no known hx of SBP, although patient poor historian. No amenable pocket for paracentesis in last admission.  Increased ascites will re-eval  - c/w DVT ppx with heparin, held for possible para    #LINNETTE  - patient presented with Cr 1.7 from dc Cr 1.2, now downtrend to 0.9. Resolved  - was likely pre renal due to sepsis    #Anemia  - f/u iron studies no signs of bleeding.  Likely due to chronic inflammation    #Thrombocytopenia  - stable, no signs of bleeding    #DM - c/w ISS  #HTN - now hypotensive, hold all BP meds  #Tobacco abuse - nicotine replacement    Remainder of plan as above

## 2022-09-13 NOTE — PROGRESS NOTE ADULT - PROBLEM SELECTOR PLAN 1
Repeat cultures from 09/10 are negative.     Plan:   - Ertapenem 1g IV q 24h  - f/u ID recs for Ab regimen   - will likely need PICC tomorrow Repeat cultures from 09/10 are negative. ID on board. No fevers, still has abdominal pain w/ concern for E coli translocation as bacteria not same as previous UTI.    Plan:   - ceftriaxone 2 g IV q24  - dc'd Ertapenem per ID   - CTAP today

## 2022-09-13 NOTE — PROGRESS NOTE ADULT - SUBJECTIVE AND OBJECTIVE BOX
O/N Events: naeo    Subjective/ROS: Patient seen and examined at bedside. No new complaints, still endorses some nonspecific abdominal pain. Denies fevers/chills, HA, CP, SOB, n/v, changes in bowel/urinary habits.  12pt ROS otherwise negative.    VITALS  Vital Signs Last 24 Hrs  T(C): 36.2 (13 Sep 2022 13:19), Max: 37.4 (12 Sep 2022 20:47)  T(F): 97.2 (13 Sep 2022 13:19), Max: 99.3 (12 Sep 2022 20:47)  HR: 92 (13 Sep 2022 13:19) (86 - 93)  BP: 112/75 (13 Sep 2022 13:19) (112/75 - 122/72)  BP(mean): --  RR: 19 (13 Sep 2022 13:19) (17 - 19)  SpO2: 100% (13 Sep 2022 13:19) (98% - 100%)    Parameters below as of 13 Sep 2022 13:19  Patient On (Oxygen Delivery Method): room air        CAPILLARY BLOOD GLUCOSE      POCT Blood Glucose.: 135 mg/dL (13 Sep 2022 17:23)  POCT Blood Glucose.: 233 mg/dL (13 Sep 2022 11:40)  POCT Blood Glucose.: 134 mg/dL (13 Sep 2022 08:29)  POCT Blood Glucose.: 192 mg/dL (12 Sep 2022 22:01)    PHYSICAL EXAM  General: NAD, conversive  Head: NC/AT; MMM; PERRL; EOMI; missing teeth   Neck: Supple; no JVD  Respiratory: CTAB  Cardiovascular: Regular rhythm/rate; S1/S2+, no murmurs, rubs gallops   Gastrointestinal: Soft; tender to palpation in RUQ  Extremities: WWP; no edema/cyanosis  Neurological: A&Ox3, CNII-XII grossly intact; no obvious focal deficits    MEDICATIONS  (STANDING):  cefTRIAXone   IVPB 2000 milliGRAM(s) IV Intermittent every 24 hours  dextrose 5%. 1000 milliLiter(s) (50 mL/Hr) IV Continuous <Continuous>  dextrose 5%. 1000 milliLiter(s) (100 mL/Hr) IV Continuous <Continuous>  dextrose 50% Injectable 25 Gram(s) IV Push once  dextrose 50% Injectable 12.5 Gram(s) IV Push once  dextrose 50% Injectable 25 Gram(s) IV Push once  folic acid 1 milliGRAM(s) Oral daily  glucagon  Injectable 1 milliGRAM(s) IntraMuscular once  heparin   Injectable 7500 Unit(s) SubCutaneous every 8 hours  insulin lispro (ADMELOG) corrective regimen sliding scale   SubCutaneous Before meals and at bedtime  lactulose Syrup 20 Gram(s) Oral every 12 hours  midodrine 5 milliGRAM(s) Oral every 8 hours  multivitamin/minerals 1 Tablet(s) Oral daily  nicotine -  14 mG/24Hr(s) Patch 1 Patch Transdermal daily  oxyCODONE    IR 10 milliGRAM(s) Oral three times a day  pantoprazole    Tablet 40 milliGRAM(s) Oral before breakfast  thiamine 100 milliGRAM(s) Oral daily    MEDICATIONS  (PRN):  dextrose Oral Gel 15 Gram(s) Oral once PRN Blood Glucose LESS THAN 70 milliGRAM(s)/deciliter      No Known Allergies      LABS                        8.8    5.25  )-----------( 106      ( 13 Sep 2022 09:58 )             27.3     09-13    130<L>  |  94<L>  |  17  ----------------------------<  155<H>  4.0   |  26  |  0.78    Ca    9.2      13 Sep 2022 09:59  Phos  2.5     09-13  Mg     1.8     09-13                  IMAGING/EKG/ETC

## 2022-09-13 NOTE — PROGRESS NOTE ADULT - PROBLEM SELECTOR PLAN 2
From previous ED admission, left untreated. See above. No symptoms, inconsistent with E.Coli in blood cultures. See above.

## 2022-09-13 NOTE — PROGRESS NOTE ADULT - SUBJECTIVE AND OBJECTIVE BOX
infectious diseases progress note    INTERVAL HPI/OVERNIGHT EVENTS: Patient without complaints this morning. He denies fever, chills, nausea, vomiting, abdominal pain, neck pain, back pain, diarrhea or dysuria. He has been tolerating PO diet.     ROS:  CONSTITUTIONAL:  Negative fever or chills, feels well, good appetite  EYES:  Negative  blurry vision or double vision  CARDIOVASCULAR:  Negative for chest pain or palpitations  RESPIRATORY:  Negative for cough, wheezing, or SOB   GASTROINTESTINAL:  Negative for nausea, vomiting, diarrhea, constipation, or abdominal pain  GENITOURINARY:  Negative frequency, urgency or dysuria  NEUROLOGIC:  No headache, confusion, dizziness, lightheadedness    Allergies    No Known Allergies    Intolerances        ANTIBIOTICS/RELEVANT:  antimicrobials  cefTRIAXone   IVPB 2000 milliGRAM(s) IV Intermittent every 24 hours    immunologic:    OTHER:  dextrose 5%. 1000 milliLiter(s) IV Continuous <Continuous>  dextrose 5%. 1000 milliLiter(s) IV Continuous <Continuous>  dextrose 50% Injectable 25 Gram(s) IV Push once  dextrose 50% Injectable 12.5 Gram(s) IV Push once  dextrose 50% Injectable 25 Gram(s) IV Push once  dextrose Oral Gel 15 Gram(s) Oral once PRN  diatrizoate meglumine/diatrizoate sodium. 30 milliLiter(s) Oral once  folic acid 1 milliGRAM(s) Oral daily  glucagon  Injectable 1 milliGRAM(s) IntraMuscular once  heparin   Injectable 7500 Unit(s) SubCutaneous every 8 hours  insulin lispro (ADMELOG) corrective regimen sliding scale   SubCutaneous Before meals and at bedtime  lactulose Syrup 20 Gram(s) Oral every 12 hours  midodrine 5 milliGRAM(s) Oral every 8 hours  multivitamin/minerals 1 Tablet(s) Oral daily  nicotine -  14 mG/24Hr(s) Patch 1 Patch Transdermal daily  oxyCODONE    IR 10 milliGRAM(s) Oral three times a day  pantoprazole    Tablet 40 milliGRAM(s) Oral before breakfast  thiamine 100 milliGRAM(s) Oral daily      Objective:  Vital Signs Last 24 Hrs  T(C): 37.3 (13 Sep 2022 06:29), Max: 37.4 (12 Sep 2022 20:47)  T(F): 99.1 (13 Sep 2022 06:29), Max: 99.3 (12 Sep 2022 20:47)  HR: 89 (13 Sep 2022 06:29) (86 - 93)  BP: 122/72 (13 Sep 2022 06:29) (119/75 - 122/72)  BP(mean): --  RR: 18 (13 Sep 2022 06:29) (17 - 18)  SpO2: 100% (13 Sep 2022 06:29) (98% - 100%)    Parameters below as of 13 Sep 2022 06:29  Patient On (Oxygen Delivery Method): room air        PHYSICAL EXAM:  Constitutional: NAD, sitting at bedside, chronically ill appearing   HEENT: PERRLA, EOMI, MMM, poor dentition, no erythema or ulcerations   Neck: No LAD, No JVD  Chest: gynecomastia   Back: Normal spine flexure, No CVA tenderness  Respiratory: CTAB  Cardiovascular: S1 and S2, RRR, no M/G/R  Gastrointestinal: BS+, soft, mild distention. Patient with TTP w/ voluntary guarding in LLQ and RLQ. He has TTP epigastrium however non tender when distracted. Less impressive exam than previously noted however still with tenderness   Extremities: No peripheral edema  Vascular: 2+ peripheral pulses  Neurological: A/O x 3, no focal deficits  Psychiatric: Normal mood, normal affect  Musculoskeletal: 5/5 strength b/l upper and lower extremities  Skin: No rash noted         LABS:                        8.8    5.25  )-----------( 106      ( 13 Sep 2022 09:58 )             27.3     09-13    130<L>  |  94<L>  |  17  ----------------------------<  155<H>  4.0   |  26  |  0.78    Ca    9.2      13 Sep 2022 09:59  Phos  2.5     09-13  Mg     1.8     09-13              MICROBIOLOGY:        RADIOLOGY & ADDITIONAL STUDIES:

## 2022-09-13 NOTE — PROGRESS NOTE ADULT - PROBLEM SELECTOR PLAN 4
Ab ultrasound from previous admission w/ cirrhosis: PT/INR slightly elevated.     Plan:   - C/w lactulose q8  - Abdominal US  - GI on board, will need f/u outpatient Ab ultrasound from previous admission w/ cirrhosis: PT/INR slightly elevated.     Plan:   - C/w lactulose q8  - GI on board, will need f/u outpatient

## 2022-09-13 NOTE — PROGRESS NOTE ADULT - ASSESSMENT
52 y/o man with PMH significant for cirrhosis with frequent paracentesis every few months at [facility], ETOH abuse disorder, DM with previously + ESBL E coli on Urine culture on admission from 09/04-09/06 which was asymptomatic and patient did not receive antibiotics during that admission. Since discharged form hospital on 09/7 patient has c/o RUQ, LUQ and epigastric pain which is sharp and stabbing in nature, and has had multiple episodes of LOC. He presented tachycardic and hypotensive requiring 2.8L NS and levophed, admitted to [facility]. Patient had a UA from 09/09 which was positive for bacteria, many WBC and small LE, negative nitrite and was started on ertapenem. He is w/o leukocytosis and currently afebrile. Blood culture + E. coli sensitive to CTX. His exam is significant for abdominal TTP in multiple quadrants and had denied urinary symptoms. Initially culture from 09/04 not likely from urinary source. Urine culture 5 days later w/o treatment growing <10,000 CFU or normal urogenital christo. Would pursue further workup for abdominal etiologies of positive bacteremia.    Recommend:   - c/w CTX 2g IV q24 hour   - Recommend CT A/P with PO contrast, IV w/ and w/o contrast  - Continue to follow surveillance cx from 09/10 - NGTD.       Team 1 will continue to follow. Case discussed with Dr. Cristobal and primary team.     Note is not finalized until attending attestation is complete.  52 y/o man with PMH significant for cirrhosis with frequent paracentesis every few months at [facility], ETOH abuse disorder, DM with previously + ESBL E coli on Urine culture on admission from 09/04-09/06 which was asymptomatic and patient did not receive antibiotics during that admission. Since discharged form hospital on 09/7 patient has c/o RUQ, LUQ and epigastric pain which is sharp and stabbing in nature, and has had multiple episodes of LOC. He presented tachycardic and hypotensive requiring 2.8L NS and levophed, admitted to [facility]. Patient had a UA from 09/09 which was positive for bacteria, many WBC and small LE, negative nitrite and was started on ertapenem. He is w/o leukocytosis and currently afebrile. Blood culture + E. coli sensitive to CTX. His exam is significant for abdominal TTP in multiple quadrants and had denied urinary symptoms. Initially culture from 09/04 not likely from urinary source. Urine culture 5 days later w/o treatment growing <10,000 CFU or normal urogenital christo. Would pursue further workup for abdominal etiologies of positive bacteremia.    Recommend:   - c/w CTX 2g IV q24 hour   - Recommend CT A/P with PO contrast, IV w/ and w/o contrast  - Continue to follow surveillance cx from 09/10 - NGTD.       Team 1 will continue to follow. Case discussed with Dr. Cristobal and primary team.     Note is not finalized until attending attestation is complete.  50 y/o man with PMH significant for cirrhosis with frequent paracentesis every few months at [facility], ETOH abuse disorder, DM with previously + ESBL E coli on Urine culture on admission from 09/04-09/06 which was asymptomatic and patient did not receive antibiotics during that admission. Since discharged form hospital on 09/7 patient has c/o RUQ, LUQ and epigastric pain which is sharp and stabbing in nature, and has had multiple episodes of LOC. He presented tachycardic and hypotensive requiring 2.8L NS and levophed, admitted to [facility]. Patient had a UA from 09/09 which was positive for bacteria, many WBC and small LE, negative nitrite and was started on ertapenem. He is w/o leukocytosis and currently afebrile. Blood culture + E. coli sensitive to CTX. His exam is significant for abdominal TTP in multiple quadrants and had denied urinary symptoms. Initially culture from 09/04 not likely from urinary source. Urine culture 5 days later w/o treatment growing <10,000 CFU or normal urogenital christo. Would pursue further workup for abdominal etiologies of positive bacteremia.    Recommend:   - c/w CTX 2g IV q24 hour   - Recommend CT A/P with PO contrast, IV w/ and w/o contrast  - Continue to follow surveillance cx from 09/10 - NGTD.       Team 1 will continue to follow. Case discussed with Dr. Cristobal and primary team.     Note is not finalized until attending attestation is complete.

## 2022-09-13 NOTE — PROGRESS NOTE ADULT - ATTENDING COMMENTS
52 yo M with DM, asthma, cirrhosis, PUD, TENZIN s/p gastric bypass with E coli bacteremia.  He is afebrile with normal WBC.  CT A/P shows increased ascites compared to 9/4.  Would discuss with IR if paracentesis is possible as SBP would be high in the differential as source for E coli bacteremia.  Would continue to f/u blood culture from 9/10, continue ceftriaxone for now.  Will follow with you -team 1. 50 yo M with DM, asthma, cirrhosis, PUD, TENZIN s/p gastric bypass with E coli bacteremia.  He is afebrile with normal WBC.  CT A/P shows increased ascites compared to 9/4.  Would discuss with IR if paracentesis is possible as SBP would be high in the differential as source for E coli bacteremia.  Would continue to f/u blood culture from 9/10, continue ceftriaxone for now.  Will follow with you -team 1.

## 2022-09-14 LAB
ALBUMIN FLD-MCNC: 1.4 G/DL — SIGNIFICANT CHANGE UP
ANION GAP SERPL CALC-SCNC: 9 MMOL/L — SIGNIFICANT CHANGE UP (ref 5–17)
B PERT IGG+IGM PNL SER: SIGNIFICANT CHANGE UP
BUN SERPL-MCNC: 13 MG/DL — SIGNIFICANT CHANGE UP (ref 7–23)
CALCIUM SERPL-MCNC: 9.2 MG/DL — SIGNIFICANT CHANGE UP (ref 8.4–10.5)
CHLORIDE SERPL-SCNC: 94 MMOL/L — LOW (ref 96–108)
CO2 SERPL-SCNC: 27 MMOL/L — SIGNIFICANT CHANGE UP (ref 22–31)
COLOR FLD: SIGNIFICANT CHANGE UP
CREAT SERPL-MCNC: 0.76 MG/DL — SIGNIFICANT CHANGE UP (ref 0.5–1.3)
EGFR: 107 ML/MIN/1.73M2 — SIGNIFICANT CHANGE UP
FERRITIN SERPL-MCNC: 117 NG/ML — SIGNIFICANT CHANGE UP (ref 30–400)
FLUID INTAKE SUBSTANCE CLASS: SIGNIFICANT CHANGE UP
GLUCOSE BLDC GLUCOMTR-MCNC: 134 MG/DL — HIGH (ref 70–99)
GLUCOSE BLDC GLUCOMTR-MCNC: 151 MG/DL — HIGH (ref 70–99)
GLUCOSE BLDC GLUCOMTR-MCNC: 226 MG/DL — HIGH (ref 70–99)
GLUCOSE FLD-MCNC: 135 MG/DL — SIGNIFICANT CHANGE UP
GLUCOSE SERPL-MCNC: 137 MG/DL — HIGH (ref 70–99)
GRAM STN FLD: SIGNIFICANT CHANGE UP
HCT VFR BLD CALC: 28.3 % — LOW (ref 39–50)
HGB BLD-MCNC: 9.3 G/DL — LOW (ref 13–17)
IRON SATN MFR SERPL: 15 % — LOW (ref 16–55)
IRON SATN MFR SERPL: 45 UG/DL — SIGNIFICANT CHANGE UP (ref 45–165)
LDH SERPL L TO P-CCNC: 61 U/L — SIGNIFICANT CHANGE UP
LYMPHOCYTES # FLD: 72 % — SIGNIFICANT CHANGE UP
MAGNESIUM SERPL-MCNC: 1.9 MG/DL — SIGNIFICANT CHANGE UP (ref 1.6–2.6)
MCHC RBC-ENTMCNC: 27.7 PG — SIGNIFICANT CHANGE UP (ref 27–34)
MCHC RBC-ENTMCNC: 32.9 GM/DL — SIGNIFICANT CHANGE UP (ref 32–36)
MCV RBC AUTO: 84.2 FL — SIGNIFICANT CHANGE UP (ref 80–100)
MONOS+MACROS # FLD: 17 % — SIGNIFICANT CHANGE UP
NEUTROPHILS-BODY FLUID: 11 % — SIGNIFICANT CHANGE UP
NRBC # BLD: 0 /100 WBCS — SIGNIFICANT CHANGE UP (ref 0–0)
PHOSPHATE SERPL-MCNC: 2.8 MG/DL — SIGNIFICANT CHANGE UP (ref 2.5–4.5)
PLATELET # BLD AUTO: 127 K/UL — LOW (ref 150–400)
POTASSIUM SERPL-MCNC: 4.3 MMOL/L — SIGNIFICANT CHANGE UP (ref 3.5–5.3)
POTASSIUM SERPL-SCNC: 4.3 MMOL/L — SIGNIFICANT CHANGE UP (ref 3.5–5.3)
PROT FLD-MCNC: 2.9 G/DL — SIGNIFICANT CHANGE UP
RBC # BLD: 3.36 M/UL — LOW (ref 4.2–5.8)
RBC # FLD: 15.1 % — HIGH (ref 10.3–14.5)
RCV VOL RI: HIGH /UL (ref 0–0)
SARS-COV-2 RNA SPEC QL NAA+PROBE: SIGNIFICANT CHANGE UP
SODIUM SERPL-SCNC: 130 MMOL/L — LOW (ref 135–145)
SPECIMEN SOURCE FLD: SIGNIFICANT CHANGE UP
SPECIMEN SOURCE: SIGNIFICANT CHANGE UP
TIBC SERPL-MCNC: 310 UG/DL — SIGNIFICANT CHANGE UP (ref 220–430)
TOTAL NUCLEATED CELL COUNT, BODY FLUID: 732 /UL — SIGNIFICANT CHANGE UP
UIBC SERPL-MCNC: 265 UG/DL — SIGNIFICANT CHANGE UP (ref 110–370)
WBC # BLD: 5.45 K/UL — SIGNIFICANT CHANGE UP (ref 3.8–10.5)
WBC # FLD AUTO: 5.45 K/UL — SIGNIFICANT CHANGE UP (ref 3.8–10.5)

## 2022-09-14 PROCEDURE — 49083 ABD PARACENTESIS W/IMAGING: CPT

## 2022-09-14 PROCEDURE — 99233 SBSQ HOSP IP/OBS HIGH 50: CPT | Mod: GC

## 2022-09-14 PROCEDURE — 99232 SBSQ HOSP IP/OBS MODERATE 35: CPT | Mod: GC

## 2022-09-14 PROCEDURE — 99447 NTRPROF PH1/NTRNET/EHR 11-20: CPT

## 2022-09-14 RX ORDER — LACTULOSE 10 G/15ML
20 SOLUTION ORAL EVERY 12 HOURS
Refills: 0 | Status: DISCONTINUED | OUTPATIENT
Start: 2022-09-14 | End: 2022-09-20

## 2022-09-14 RX ORDER — PHENYLEPHRINE-SHARK LIVER OIL-MINERAL OIL-PETROLATUM RECTAL OINTMENT
1 OINTMENT (GRAM) RECTAL THREE TIMES A DAY
Refills: 0 | Status: DISCONTINUED | OUTPATIENT
Start: 2022-09-14 | End: 2022-09-20

## 2022-09-14 RX ADMIN — OXYCODONE HYDROCHLORIDE 10 MILLIGRAM(S): 5 TABLET ORAL at 21:57

## 2022-09-14 RX ADMIN — Medication 1 PATCH: at 12:49

## 2022-09-14 RX ADMIN — OXYCODONE HYDROCHLORIDE 10 MILLIGRAM(S): 5 TABLET ORAL at 06:53

## 2022-09-14 RX ADMIN — OXYCODONE HYDROCHLORIDE 10 MILLIGRAM(S): 5 TABLET ORAL at 22:57

## 2022-09-14 RX ADMIN — Medication 100 MILLIGRAM(S): at 11:40

## 2022-09-14 RX ADMIN — Medication 1 PATCH: at 11:41

## 2022-09-14 RX ADMIN — MIDODRINE HYDROCHLORIDE 5 MILLIGRAM(S): 2.5 TABLET ORAL at 11:40

## 2022-09-14 RX ADMIN — OXYCODONE HYDROCHLORIDE 10 MILLIGRAM(S): 5 TABLET ORAL at 15:27

## 2022-09-14 RX ADMIN — OXYCODONE HYDROCHLORIDE 10 MILLIGRAM(S): 5 TABLET ORAL at 05:53

## 2022-09-14 RX ADMIN — PANTOPRAZOLE SODIUM 40 MILLIGRAM(S): 20 TABLET, DELAYED RELEASE ORAL at 07:28

## 2022-09-14 RX ADMIN — CEFTRIAXONE 100 MILLIGRAM(S): 500 INJECTION, POWDER, FOR SOLUTION INTRAMUSCULAR; INTRAVENOUS at 16:00

## 2022-09-14 RX ADMIN — Medication 2: at 22:04

## 2022-09-14 RX ADMIN — Medication 1 PATCH: at 20:05

## 2022-09-14 RX ADMIN — MIDODRINE HYDROCHLORIDE 5 MILLIGRAM(S): 2.5 TABLET ORAL at 01:15

## 2022-09-14 RX ADMIN — MIDODRINE HYDROCHLORIDE 5 MILLIGRAM(S): 2.5 TABLET ORAL at 19:20

## 2022-09-14 RX ADMIN — PHENYLEPHRINE-SHARK LIVER OIL-MINERAL OIL-PETROLATUM RECTAL OINTMENT 1 APPLICATION(S): at 19:44

## 2022-09-14 RX ADMIN — OXYCODONE HYDROCHLORIDE 10 MILLIGRAM(S): 5 TABLET ORAL at 14:57

## 2022-09-14 RX ADMIN — Medication 1 PATCH: at 05:59

## 2022-09-14 RX ADMIN — Medication 1 TABLET(S): at 11:41

## 2022-09-14 RX ADMIN — Medication 3 MILLIGRAM(S): at 21:59

## 2022-09-14 RX ADMIN — Medication 1 MILLIGRAM(S): at 11:40

## 2022-09-14 NOTE — PROGRESS NOTE ADULT - ATTENDING COMMENTS
50 y/o man with PMH significant for cirrhosis, DM who presented with septic shock and found to have E coli bacteremia    #Ecoli bacteremia  - previous admit with UA + for ESBL Ecoli, was not treated at that time as patient asymptomatic  - Blood cultures are E coli but NOT ESBL  - ID consulted, appreciate recs  - stopped Ertapenem and started CTX 2gm  - CT A/P with increased ascites, no other sources of infection  - IR possible paracentesis today for possible SBP although patient has been on Abx.  Pending cell counts  - Attempting to obtain collateral from [place] regarding prior paracenteses    #septic shock  - patient presented with sepsis due to E coli infection requiring levophed now improving  - Abx as above  - Weaned midodrine to 5 q8 from 10, will wean as tolerated.  Holding weaning today in light of large volume paracentesis and possible fluid shift  - hold all BP medications    #Cirrhosis  - due to EtOH abuse, no appreciable ascites on exam although limited due to habitus with excess skin due to patient's weight loss  - patient previously on Lasix and Spironolactone, now held due to hypotension  - c/w outpatient follow up with [facility].  Will likely not be able to restart diuretic therapy during this admission  - c/w lactulose  - no known hx of SBP, although patient poor historian. No amenable pocket for paracentesis in last admission.  Increased ascites and s/p para today  - c/w DVT ppx with heparin    #LINNETTE  - patient presented with Cr 1.7 from dc Cr 1.2, now downtrend to 0.9. Resolved  - was likely pre renal due to sepsis    #Anemia  - Likely due to chronic inflammation, mild KELLY    #Thrombocytopenia  - stable, no signs of bleeding    #DM - c/w ISS  #HTN - now hypotensive, hold all BP meds  #Tobacco abuse - nicotine replacement    Remainder of plan as above 50 y/o man with PMH significant for cirrhosis, DM who presented with septic shock and found to have E coli bacteremia    #Ecoli bacteremia  - previous admit with UA + for ESBL Ecoli, was not treated at that time as patient asymptomatic  - Blood cultures are E coli but NOT ESBL  - ID consulted, appreciate recs  - stopped Ertapenem and started CTX 2gm  - CT A/P with increased ascites, no other sources of infection  - IR possible paracentesis today for possible SBP although patient has been on Abx.  Pending cell counts  - Attempting to obtain collateral from [place] regarding prior paracenteses    #septic shock  - patient presented with sepsis due to E coli infection requiring levophed now improving  - Abx as above  - Weaned midodrine to 5 q8 from 10, will wean as tolerated.  Holding weaning today in light of large volume paracentesis and possible fluid shift  - hold all BP medications    #Cirrhosis  - due to EtOH abuse, no appreciable ascites on exam although limited due to habitus with excess skin due to patient's weight loss  - patient previously on Lasix and Spironolactone, now held due to hypotension  - c/w outpatient follow up with [facility].  Will likely not be able to restart diuretic therapy during this admission  - c/w lactulose  - no known hx of SBP, although patient poor historian. No amenable pocket for paracentesis in last admission.  Increased ascites and s/p para today  - c/w DVT ppx with heparin    #LINNETTE  - patient presented with Cr 1.7 from dc Cr 1.2, now downtrend to 0.9. Resolved  - was likely pre renal due to sepsis    #Anemia  - Likely due to chronic inflammation, mild KELLY    #Thrombocytopenia  - stable, no signs of bleeding    #DM - c/w ISS  #HTN - now hypotensive, hold all BP meds  #Tobacco abuse - nicotine replacement    Remainder of plan as above 50 y/o man with PMH significant for cirrhosis, DM who presented with septic shock and found to have E coli bacteremia    #Ecoli bacteremia  - previous admit with UA + for ESBL Ecoli, was not treated at that time as patient asymptomatic  - Blood cultures are E coli but NOT ESBL  - ID consulted, appreciate recs  - stopped Ertapenem and started CTX 2gm  - CT A/P with increased ascites, no other sources of infection  - IR possible paracentesis today for possible SBP although patient has been on Abx.  Pending cell counts  - Attempting to obtain collateral from [place] regarding prior paracenteses    #septic shock  - patient presented with sepsis due to E coli infection requiring levophed now improving  - Abx as above  - Weaned midodrine to 5 q8 from 10, will wean as tolerated.  Holding weaning today in light of large volume paracentesis and possible fluid shift  - hold all BP medications    #Cirrhosis  - due to EtOH abuse, no appreciable ascites on exam although limited due to habitus with excess skin due to patient's weight loss  - patient previously on Lasix and Spironolactone, now held due to hypotension  - c/w outpatient follow up with [facility].  Will likely not be able to restart diuretic therapy during this admission  - c/w lactulose  - no known hx of SBP, although patient poor historian. No amenable pocket for paracentesis in last admission.  Increased ascites and s/p para today  - c/w DVT ppx with heparin    #LINNETTE  - patient presented with Cr 1.7 from dc Cr 1.2, now downtrend to 0.9. Resolved  - was likely pre renal due to sepsis    #Anemia  - Likely due to chronic inflammation, mild KELLY    #Thrombocytopenia  - stable, no signs of bleeding    #DM - c/w ISS  #HTN - now hypotensive, hold all BP meds  #Tobacco abuse - nicotine replacement    Remainder of plan as above

## 2022-09-14 NOTE — CONSULT NOTE ADULT - SUBJECTIVE AND OBJECTIVE BOX
52 y/o man with PMH significant for cirrhosis, DM with previously untreated ESBL UTI, presenting with rigors, AMS, fever and hemodynamic instability with elevated WBC count likely due to multiorgan failure 2/2 untreated ESBL UTI and E.Coli bacteremia. Pt also reporting abd pain. Pt with severe sepsis now improved. IR consulted for paracentesis to r/o SBP.    Clinical History: EVAL    FH: kidney disease    FH: diabetes mellitus    Handoff    MEWS Score    Diabetes mellitus    Alcohol abuse    Smoking    TENZIN (obstructive sleep apnea)    Asthma    Peptic ulcer    Peptic ulcer disease    Cirrhosis    Fatigue    Septic shock    Metabolic encephalopathy    Severe sepsis with septic shock    Peptic ulcer disease    Cirrhosis    UTI due to extended-spectrum beta lactamase (ESBL) producing Escherichia coli    Prophylactic measure    Diabetes mellitus    TENZIN (obstructive sleep apnea)    Anemia    Bacteremia due to Escherichia coli    H/O gastric bypass    EVAL    2    Uremia    Altered mental status    [email or web address]        Meds:cefTRIAXone   IVPB 2000 milliGRAM(s) IV Intermittent every 24 hours  dextrose 5%. 1000 milliLiter(s) IV Continuous <Continuous>  dextrose 5%. 1000 milliLiter(s) IV Continuous <Continuous>  dextrose 50% Injectable 25 Gram(s) IV Push once  dextrose 50% Injectable 12.5 Gram(s) IV Push once  dextrose 50% Injectable 25 Gram(s) IV Push once  dextrose Oral Gel 15 Gram(s) Oral once PRN  folic acid 1 milliGRAM(s) Oral daily  glucagon  Injectable 1 milliGRAM(s) IntraMuscular once  insulin lispro (ADMELOG) corrective regimen sliding scale   SubCutaneous Before meals and at bedtime  lactulose Syrup 20 Gram(s) Oral every 12 hours  melatonin 3 milliGRAM(s) Oral at bedtime PRN  midodrine 5 milliGRAM(s) Oral every 8 hours  multivitamin/minerals 1 Tablet(s) Oral daily  nicotine -  14 mG/24Hr(s) Patch 1 Patch Transdermal daily  oxyCODONE    IR 10 milliGRAM(s) Oral three times a day  pantoprazole    Tablet 40 milliGRAM(s) Oral before breakfast  thiamine 100 milliGRAM(s) Oral daily      Allergies:No Known Allergies        Labs:                           9.3    5.45  )-----------( 127      ( 14 Sep 2022 05:30 )             28.3       09-14    130<L>  |  94<L>  |  13  ----------------------------<  137<H>  4.3   |  27  |  0.76    Ca    9.2      14 Sep 2022 05:30  Phos  2.8     09-14  Mg     1.9     09-14            Imaging Findings: reviewed, small ascites     50 y/o man with PMH significant for cirrhosis, DM with previously untreated ESBL UTI, presenting with rigors, AMS, fever and hemodynamic instability with elevated WBC count likely due to multiorgan failure 2/2 untreated ESBL UTI and E.Coli bacteremia. Pt also reporting abd pain. Pt with severe sepsis now improved. IR consulted for paracentesis to r/o SBP.    Clinical History: EVAL    FH: kidney disease    FH: diabetes mellitus    Handoff    MEWS Score    Diabetes mellitus    Alcohol abuse    Smoking    TENZIN (obstructive sleep apnea)    Asthma    Peptic ulcer    Peptic ulcer disease    Cirrhosis    Fatigue    Septic shock    Metabolic encephalopathy    Severe sepsis with septic shock    Peptic ulcer disease    Cirrhosis    UTI due to extended-spectrum beta lactamase (ESBL) producing Escherichia coli    Prophylactic measure    Diabetes mellitus    TENZIN (obstructive sleep apnea)    Anemia    Bacteremia due to Escherichia coli    H/O gastric bypass    EVAL    2    Uremia    Altered mental status    [email or web address]        Meds:cefTRIAXone   IVPB 2000 milliGRAM(s) IV Intermittent every 24 hours  dextrose 5%. 1000 milliLiter(s) IV Continuous <Continuous>  dextrose 5%. 1000 milliLiter(s) IV Continuous <Continuous>  dextrose 50% Injectable 25 Gram(s) IV Push once  dextrose 50% Injectable 12.5 Gram(s) IV Push once  dextrose 50% Injectable 25 Gram(s) IV Push once  dextrose Oral Gel 15 Gram(s) Oral once PRN  folic acid 1 milliGRAM(s) Oral daily  glucagon  Injectable 1 milliGRAM(s) IntraMuscular once  insulin lispro (ADMELOG) corrective regimen sliding scale   SubCutaneous Before meals and at bedtime  lactulose Syrup 20 Gram(s) Oral every 12 hours  melatonin 3 milliGRAM(s) Oral at bedtime PRN  midodrine 5 milliGRAM(s) Oral every 8 hours  multivitamin/minerals 1 Tablet(s) Oral daily  nicotine -  14 mG/24Hr(s) Patch 1 Patch Transdermal daily  oxyCODONE    IR 10 milliGRAM(s) Oral three times a day  pantoprazole    Tablet 40 milliGRAM(s) Oral before breakfast  thiamine 100 milliGRAM(s) Oral daily      Allergies:No Known Allergies        Labs:                           9.3    5.45  )-----------( 127      ( 14 Sep 2022 05:30 )             28.3       09-14    130<L>  |  94<L>  |  13  ----------------------------<  137<H>  4.3   |  27  |  0.76    Ca    9.2      14 Sep 2022 05:30  Phos  2.8     09-14  Mg     1.9     09-14            Imaging Findings: reviewed, small ascites

## 2022-09-14 NOTE — PROGRESS NOTE ADULT - PROBLEM SELECTOR PLAN 1
Repeat cultures from 09/10 are negative. ID on board. No fevers, still has abdominal pain w/ concern for E coli translocation as bacteria not same as previous UTI. CTAP positive for abdominal ascites, paracentesis this morning with 900 cc ebenezer fluid removed.     Plan:   - ceftriaxone 2 g IV q24  - will need midline, placement later this afternoon  - paracentesis today removed 950 cc - f/u results

## 2022-09-14 NOTE — CONSULT NOTE ADULT - ASSESSMENT
Assessment: 52 y/o man with PMH significant for cirrhosis, DM with previously untreated ESBL UTI, presenting with rigors, AMS, fever and hemodynamic instability with elevated WBC count likely due to multiorgan failure 2/2 untreated ESBL UTI and E.Coli bacteremia. Pt also reporting abd pain. Pt with severe sepsis now improved. IR consulted for paracentesis to r/o SBP. Case reviewed with Dr. Singh, plan for diagnostic paracentesis with ultrasound guidance and local anesthesia.     Recommendations: Please ensure Covid swab is up to date (within last 72 hours).    Communicated with: primary team       Assessment: 50 y/o man with PMH significant for cirrhosis, DM with previously untreated ESBL UTI, presenting with rigors, AMS, fever and hemodynamic instability with elevated WBC count likely due to multiorgan failure 2/2 untreated ESBL UTI and E.Coli bacteremia. Pt also reporting abd pain. Pt with severe sepsis now improved. IR consulted for paracentesis to r/o SBP. Case reviewed with Dr. Singh, plan for diagnostic paracentesis with ultrasound guidance and local anesthesia.     Recommendations: Please ensure Covid swab is up to date (within last 72 hours).    Communicated with: primary team

## 2022-09-14 NOTE — PROGRESS NOTE ADULT - ASSESSMENT
per Internal Medicine    50 y/o man with PMH significant for cirrhosis, DM with previously untreated ESBL UTI, presenting with rigors, AMS, fever and hemodynamic instability with elevated WBC count likely due to multiorgan failure 2/2 untreated ESBL UTI and E.Coli bacteremia.      Problem/Plan - 1:  ·  Problem: Bacteremia due to Escherichia coli.   ·  Plan: Repeat cultures from 09/10 are negative. ID on board. No fevers, still has abdominal pain w/ concern for E coli translocation as bacteria not same as previous UTI.    Plan:   - ceftriaxone 2 g IV q24  - dc'd Ertapenem per ID   - CTAP today.    Problem/Plan - 2:  ·  Problem: UTI due to extended-spectrum beta lactamase (ESBL) producing Escherichia coli.   ·  Plan: No symptoms, inconsistent with E.Coli in blood cultures. See above.    Problem/Plan - 3:  ·  Problem: Severe sepsis with septic shock.   ·  Plan: RESOLVED.   Likely source UTI (ESBL)  Patient meeting sepsis criteria (tachycardic, febrile)  He has low Bp, WBC of 10.74, lactate 2.5, elevated Cr (1.72), and elevated bili (1.5) and AST  Improving: Good UOP, Cr improved, bili improved, mental status is better, lactate cleared, euvolemic with good perfusion. Weaned off of levo.  Blood cultures positive for gram negative rods in all 4 bottles.    Problem/Plan - 4:  ·  Problem: Cirrhosis.   ·  Plan: Ab ultrasound from previous admission w/ cirrhosis: PT/INR slightly elevated.     Plan:   - C/w lactulose q8  - GI on board, will need f/u outpatient.    Problem/Plan - 5:  ·  Problem: Peptic ulcer disease.   ·  Plan: - PP 40 mg qd.    Problem/Plan - 6:  ·  Problem: Anemia.   ·  Plan: Hx of alcoholism, cirrhosis, malnourished     plan:  - c/w multivitamin, folic acid.    Problem/Plan - 7:  ·  Problem: Diabetes mellitus.   ·  Plan: A1C @6 on Sep 5th   Pt not on any meds at home  Insulin sliding scale.    Problem/Plan - 8:  ·  Problem: Prophylactic measure.   ·  Plan: F: tolerating PO, ondansetron for Nausea (PRN) QTc was 450   E: Replete PRN   DVT propylaxis: lovenox  GI prophylaxis: Pantoprazole.   per Internal Medicine    52 y/o man with PMH significant for cirrhosis, DM with previously untreated ESBL UTI, presenting with rigors, AMS, fever and hemodynamic instability with elevated WBC count likely due to multiorgan failure 2/2 untreated ESBL UTI and E.Coli bacteremia.      Problem/Plan - 1:  ·  Problem: Bacteremia due to Escherichia coli.   ·  Plan: Repeat cultures from 09/10 are negative. ID on board. No fevers, still has abdominal pain w/ concern for E coli translocation as bacteria not same as previous UTI.    Plan:   - ceftriaxone 2 g IV q24  - dc'd Ertapenem per ID   - CTAP today.    Problem/Plan - 2:  ·  Problem: UTI due to extended-spectrum beta lactamase (ESBL) producing Escherichia coli.   ·  Plan: No symptoms, inconsistent with E.Coli in blood cultures. See above.    Problem/Plan - 3:  ·  Problem: Severe sepsis with septic shock.   ·  Plan: RESOLVED.   Likely source UTI (ESBL)  Patient meeting sepsis criteria (tachycardic, febrile)  He has low Bp, WBC of 10.74, lactate 2.5, elevated Cr (1.72), and elevated bili (1.5) and AST  Improving: Good UOP, Cr improved, bili improved, mental status is better, lactate cleared, euvolemic with good perfusion. Weaned off of levo.  Blood cultures positive for gram negative rods in all 4 bottles.    Problem/Plan - 4:  ·  Problem: Cirrhosis.   ·  Plan: Ab ultrasound from previous admission w/ cirrhosis: PT/INR slightly elevated.     Plan:   - C/w lactulose q8  - GI on board, will need f/u outpatient.    Problem/Plan - 5:  ·  Problem: Peptic ulcer disease.   ·  Plan: - PP 40 mg qd.    Problem/Plan - 6:  ·  Problem: Anemia.   ·  Plan: Hx of alcoholism, cirrhosis, malnourished     plan:  - c/w multivitamin, folic acid.    Problem/Plan - 7:  ·  Problem: Diabetes mellitus.   ·  Plan: A1C @6 on Sep 5th   Pt not on any meds at home  Insulin sliding scale.    Problem/Plan - 8:  ·  Problem: Prophylactic measure.   ·  Plan: F: tolerating PO, ondansetron for Nausea (PRN) QTc was 450   E: Replete PRN   DVT propylaxis: lovenox  GI prophylaxis: Pantoprazole.

## 2022-09-14 NOTE — PROGRESS NOTE ADULT - ATTENDING COMMENTS
Continues afebrile, less abd tenderness.  Is s/p paracentesis - 750 nucleated cells but only 11% PMNs with 72% lymphs.  Suspect he had SBP but he had been on antibiotics for 5 d prior to paracentesis. Would treat with ceftriaxone for 7 d from negative blood culture (9/10-9/16), then d/c off antibiotics.  Please recall if further ID input is desired - team 1. Continues afebrile, less abd tenderness.  Is s/p paracentesis - 750 nucleated cells but only 11% PMNs with 72% lymphs.  Suspect he had SBP but he had been on antibiotics for 5 d prior to paracentesis. In view of cell count, likelihood that culture will be positive is low.  Would f/u blood culture from 9/10, paracentesis culture from today.  Would treat with ceftriaxone for 7 d from negative blood culture (9/10-9/16), then d/c off antibiotics.  Please recall if further ID input is desired - team 1.

## 2022-09-14 NOTE — PROGRESS NOTE ADULT - ASSESSMENT
50 y/o man with PMH significant for cirrhosis with frequent paracentesis every few months at [facility], ETOH abuse disorder, DM with previously + ESBL E coli on Urine culture on admission from 09/04-09/06 which was asymptomatic and patient did not receive antibiotics during that admission. Since discharged form hospital on 09/7 patient has c/o RUQ, LUQ and epigastric pain which is sharp and stabbing in nature, and has had multiple episodes of LOC. He presented tachycardic and hypotensive requiring 2.8L NS and levophed, admitted to [facility]. Patient had a UA from 09/09 which was positive for bacteria, many WBC and small LE, negative nitrite and was started on ertapenem. He is w/o leukocytosis and currently afebrile. Blood culture + E. coli sensitive to CTX. His exam is significant for abdominal TTP in multiple quadrants and had denied urinary symptoms. Initially culture from 09/04 not likely from urinary source. Urine culture 5 days later w/o treatment growing <10,000 CFU or normal urogenital christo. Would pursue further workup for abdominal etiologies of positive bacteremia. CT A/P showing increased ascites and cirrhosis. Patient is s/p IR paracentesis. Fluid studies showing Total cells 732 w/ neutrophils <25%, although patient has been on antibiotics.     Recommend:   - c/w CTX 2g IV q24 hour   - Continue to follow surveillance cx from 09/10 - NGTD.       Team 1 will continue to follow. Case discussed with Dr. Cristobal and primary team.     Note is not finalized until attending attestation is complete.  50 y/o man with PMH significant for cirrhosis with frequent paracentesis every few months at [facility], ETOH abuse disorder, DM with previously + ESBL E coli on Urine culture on admission from 09/04-09/06 which was asymptomatic and patient did not receive antibiotics during that admission. Since discharged form hospital on 09/7 patient has c/o RUQ, LUQ and epigastric pain which is sharp and stabbing in nature, and has had multiple episodes of LOC. He presented tachycardic and hypotensive requiring 2.8L NS and levophed, admitted to [facility]. Patient had a UA from 09/09 which was positive for bacteria, many WBC and small LE, negative nitrite and was started on ertapenem. He is w/o leukocytosis and currently afebrile. Blood culture + E. coli sensitive to CTX. His exam is significant for abdominal TTP in multiple quadrants and had denied urinary symptoms. Initially culture from 09/04 not likely from urinary source. Urine culture 5 days later w/o treatment growing <10,000 CFU or normal urogenital christo. Would pursue further workup for abdominal etiologies of positive bacteremia. CT A/P showing increased ascites and cirrhosis. Patient is s/p IR paracentesis. Fluid studies showing Total cells 732 w/ neutrophils <25%, although patient has been on antibiotics.     Recommend:   - c/w CTX 2g IV q24 hour   - Continue to follow surveillance cx from 09/10 - NGTD.       Team 1 will continue to follow. Case discussed with Dr. Cristobal and primary team.     Note is not finalized until attending attestation is complete.  52 y/o man with PMH significant for cirrhosis with frequent paracentesis every few months at [facility], ETOH abuse disorder, DM with previously + ESBL E coli on Urine culture on admission from 09/04-09/06 which was asymptomatic and patient did not receive antibiotics during that admission. Since discharged form hospital on 09/7 patient has c/o RUQ, LUQ and epigastric pain which is sharp and stabbing in nature, and has had multiple episodes of LOC. He presented tachycardic and hypotensive requiring 2.8L NS and levophed, admitted to [facility]. Patient had a UA from 09/09 which was positive for bacteria, many WBC and small LE, negative nitrite and was started on ertapenem. He is w/o leukocytosis and currently afebrile. Blood culture + E. coli sensitive to CTX. His exam is significant for abdominal TTP in multiple quadrants and had denied urinary symptoms. Initially culture from 09/04 not likely from urinary source. Urine culture 5 days later w/o treatment growing <10,000 CFU or normal urogenital christo. Would pursue further workup for abdominal etiologies of positive bacteremia. CT A/P showing increased ascites and cirrhosis. Patient is s/p IR paracentesis. Fluid studies showing Total cells 732 w/ neutrophils <25%, although patient has been on antibiotics.     Recommend:   - c/w CTX 2g IV q24 hour   - Continue to follow surveillance cx from 09/10 - NGTD.       Team 1 will continue to follow. Case discussed with Dr. Cristobal and primary team.     Note is not finalized until attending attestation is complete.  50 y/o man with PMH significant for cirrhosis with frequent paracentesis every few months at [facility], ETOH abuse disorder, DM with previously + ESBL E coli on Urine culture on admission from 09/04-09/06 which was asymptomatic and patient did not receive antibiotics during that admission. Since discharged form hospital on 09/7 patient has c/o RUQ, LUQ and epigastric pain which is sharp and stabbing in nature, and has had multiple episodes of LOC. He presented tachycardic and hypotensive requiring 2.8L NS and levophed, admitted to [facility]. Patient had a UA from 09/09 which was positive for bacteria, many WBC and small LE, negative nitrite and was started on ertapenem. He is w/o leukocytosis and currently afebrile. Blood culture + E. coli sensitive to CTX. His exam is significant for abdominal TTP in multiple quadrants and had denied urinary symptoms. Initially culture from 09/04 not likely from urinary source. Urine culture 5 days later w/o treatment growing <10,000 CFU or normal urogenital christo. Would pursue further workup for abdominal etiologies of positive bacteremia. CT A/P showing increased ascites and cirrhosis. Patient is s/p IR paracentesis. Fluid studies showing Total cells 732 w/ neutrophils <25%, although patient has been on antibiotics.     Recommend:   - c/w CTX 2g IV q24 hour - see attestation  - Continue to follow surveillance cx from 09/10 - NGTD.     Please recall if further recommendations are required.. Case discussed with Dr. Cristobal and primary team.     Note is not finalized until attending attestation is complete.  50 y/o man with PMH significant for cirrhosis with frequent paracentesis every few months at [facility], ETOH abuse disorder, DM with previously + ESBL E coli on Urine culture on admission from 09/04-09/06 which was asymptomatic and patient did not receive antibiotics during that admission. Since discharged form hospital on 09/7 patient has c/o RUQ, LUQ and epigastric pain which is sharp and stabbing in nature, and has had multiple episodes of LOC. He presented tachycardic and hypotensive requiring 2.8L NS and levophed, admitted to [facility]. Patient had a UA from 09/09 which was positive for bacteria, many WBC and small LE, negative nitrite and was started on ertapenem. He is w/o leukocytosis and currently afebrile. Blood culture + E. coli sensitive to CTX. His exam is significant for abdominal TTP in multiple quadrants and had denied urinary symptoms. Initially culture from 09/04 not likely from urinary source. Urine culture 5 days later w/o treatment growing <10,000 CFU or normal urogenital christo. Would pursue further workup for abdominal etiologies of positive bacteremia. CT A/P showing increased ascites and cirrhosis. Patient is s/p IR paracentesis. Fluid studies showing Total cells 732 w/ neutrophils <25%, although patient has been on antibiotics.     Recommend:   - c/w CTX 2g IV q24 hour - see attestation  - Continue to follow surveillance cx from 09/10 - NGTD.     Please recall if further recommendations are required.. Case discussed with Dr. Cristobal and primary team.     Note is not finalized until attending attestation is complete.  50 y/o man with PMH significant for cirrhosis with frequent paracentesis every few months at [facility], ETOH abuse disorder, DM with previously + ESBL E coli on Urine culture on admission from 09/04-09/06 which was asymptomatic and patient did not receive antibiotics during that admission. Since discharged form hospital on 09/7 patient has c/o RUQ, LUQ and epigastric pain which is sharp and stabbing in nature, and has had multiple episodes of LOC. He presented tachycardic and hypotensive requiring 2.8L NS and levophed, admitted to [facility]. Patient had a UA from 09/09 which was positive for bacteria, many WBC and small LE, negative nitrite and was started on ertapenem. He is w/o leukocytosis and currently afebrile. Blood culture + E. coli sensitive to CTX. His exam is significant for abdominal TTP in multiple quadrants and had denied urinary symptoms. Initially culture from 09/04 not likely from urinary source. Urine culture 5 days later w/o treatment growing <10,000 CFU or normal urogenital christo. Would pursue further workup for abdominal etiologies of positive bacteremia. CT A/P showing increased ascites and cirrhosis. Patient is s/p IR paracentesis. Fluid studies showing Total cells 732 w/ neutrophils <25%, although patient has been on antibiotics.     Recommend:   - c/w CTX 2g IV q24 hour - see attestation  - Continue to follow surveillance cx from 09/10 - NGTD.     Please recall if further recommendations are required.. Case discussed with Dr. Cristobal and primary team.     Note is not finalized until attending attestation is complete.

## 2022-09-14 NOTE — PROGRESS NOTE ADULT - SUBJECTIVE AND OBJECTIVE BOX
O/N Events: naeo    Subjective/ROS: Patient seen and examined at bedside, no new complaints. Walking around room, in no distress. Denies fever/chills, HA, CP, SOB, n/v, changes in bowel/urinary habits.  12pt ROS otherwise negative.    VITALS  Vital Signs Last 24 Hrs  T(C): 36.4 (14 Sep 2022 12:00), Max: 37 (13 Sep 2022 21:00)  T(F): 97.5 (14 Sep 2022 12:00), Max: 98.6 (13 Sep 2022 21:00)  HR: 88 (14 Sep 2022 12:00) (88 - 100)  BP: 117/71 (14 Sep 2022 12:00) (109/68 - 119/71)  BP(mean): --  RR: 19 (14 Sep 2022 12:00) (17 - 19)  SpO2: 100% (14 Sep 2022 12:00) (95% - 100%)    Parameters below as of 13 Sep 2022 21:00  Patient On (Oxygen Delivery Method): room air        CAPILLARY BLOOD GLUCOSE      POCT Blood Glucose.: 134 mg/dL (14 Sep 2022 08:47)  POCT Blood Glucose.: 142 mg/dL (13 Sep 2022 22:10)  POCT Blood Glucose.: 135 mg/dL (13 Sep 2022 17:23)      PHYSICAL EXAM  General: NAD, conversive  Head: NC/AT; MMM; PERRL; EOMI; missing teeth   Neck: Supple; no JVD  Respiratory: CTAB  Cardiovascular: Regular rhythm/rate; S1/S2+, no murmurs, rubs gallops   Gastrointestinal: Soft; non specific abdominal tenderness   Extremities: WWP; no edema/cyanosis  Neurological: A&Ox3, CNII-XII grossly intact; no obvious focal deficits    MEDICATIONS  (STANDING):  cefTRIAXone   IVPB 2000 milliGRAM(s) IV Intermittent every 24 hours  dextrose 5%. 1000 milliLiter(s) (50 mL/Hr) IV Continuous <Continuous>  dextrose 5%. 1000 milliLiter(s) (100 mL/Hr) IV Continuous <Continuous>  dextrose 50% Injectable 25 Gram(s) IV Push once  dextrose 50% Injectable 12.5 Gram(s) IV Push once  dextrose 50% Injectable 25 Gram(s) IV Push once  folic acid 1 milliGRAM(s) Oral daily  glucagon  Injectable 1 milliGRAM(s) IntraMuscular once  insulin lispro (ADMELOG) corrective regimen sliding scale   SubCutaneous Before meals and at bedtime  midodrine 5 milliGRAM(s) Oral every 8 hours  multivitamin/minerals 1 Tablet(s) Oral daily  nicotine -  14 mG/24Hr(s) Patch 1 Patch Transdermal daily  oxyCODONE    IR 10 milliGRAM(s) Oral three times a day  pantoprazole    Tablet 40 milliGRAM(s) Oral before breakfast  thiamine 100 milliGRAM(s) Oral daily    MEDICATIONS  (PRN):  dextrose Oral Gel 15 Gram(s) Oral once PRN Blood Glucose LESS THAN 70 milliGRAM(s)/deciliter  lactulose Syrup 20 Gram(s) Oral every 12 hours PRN Titrate 2-3 BM  melatonin 3 milliGRAM(s) Oral at bedtime PRN Sleep      No Known Allergies      LABS                        9.3    5.45  )-----------( 127      ( 14 Sep 2022 05:30 )             28.3     09-14    130<L>  |  94<L>  |  13  ----------------------------<  137<H>  4.3   |  27  |  0.76    Ca    9.2      14 Sep 2022 05:30  Phos  2.8     09-14  Mg     1.9     09-14                  IMAGING/EKG/ETC

## 2022-09-14 NOTE — PROGRESS NOTE ADULT - PROBLEM SELECTOR PLAN 4
Ab ultrasound from previous admission w/ cirrhosis: PT/INR slightly elevated. S/p paracentesis 900 cc removed.     Plan:   - C/w lactulose q8 prn w/ goal of 2-3 bm per day

## 2022-09-14 NOTE — PROGRESS NOTE ADULT - SUBJECTIVE AND OBJECTIVE BOX
Physical Medicine and Rehabilitation Progress Note :      Patient is a 51y old  Male who presents with a chief complaint of septic shock (13 Sep 2022 16:44)      HPI:  50 y/o man with PMH of cirrhosis, DM, TENZIN (no longer on CPAP due to insurance issues), asthma, PUD, abdominal hernia (per patient), chronic back pain, Hx of gastric bypass, and Hx of multiple paracentesis to abdomen presenting from his shelter for rigors, abdominal pain, dizziness, nausea, SOB and somnolence. He said this started on 09/07 after being discharged from [facility]. He developed RUQ, LUQ and epigastric, sharp/stabbing, non radiating abdominal pain that he rates 9/10, along with dizziness and difficulty ambulating as he would feel like he is about to "pass out". The patient has lost consciousness a few times in the past while ambulating, but has not lost consciousness leading up to this admission and never sustained trauma to his head. He has been nauseous since discharge and has not eaten for the past few days. He also gets short of breath on excretion and palpitations, but has not had any CP, changes in vision, changes in hearing, headaches, no easy bruising or bleeding, no hematuria or hematochezia, no pain with urination or urgency, but frequency was present, no constipation or diarrhea. He does admit to having lower extremity edema that requires him to elevate his legs when sleeping, lower extremity neuropathic pain for which he takes gabapentin and back pain for which he takes oxycodone.   Of note he usually follows at [facility] where he gets his medication refills and paracentesis every few months. He was admitted on September 4th to [facility] for generalized weakness, fatigue, decreased appetite, generalized abdominal discomfort, at the time he had some insurance issues which prevented him from getting his med refill and paracentesis for 2 months. It was noted that during transport from [facility] to [facility] he was hypotensive Bp 80/46, was asymptomatic, was given 1L NS which did not improve his Bp. His utox was positive for THC and cocaine and was found to have an LINNETTE (Cr 3.5), nephrology and GI consulted and given albumin (100cc) with improvement in Cr (to 1.22 on 9/6), UOP and Bp. CT A/P showed hepatic cirrhosis, small ascites, spenomegaly, anasarca suggesting portal hypertension/volume overload. His US doppler of the abdomen showed normal flow in IVC, portal, hepatic and splenic veins.  There were no concerns for SBP (no WBC count or Fevers), did not receive paracentesis (minimal ascites not amenable to drainage according to IR) and no abx were started. He developed asterixis on 9/6 was started on lactulose and discharged w/ plan to f/u with GI in a week to resume spironolactone which was stopped on D/c. His MELD score on discharge was 23 on dialysis. However, urine culture from 9/4 grew ESBL.     On this visit, he arrived to [facility] from his shelter by ambulance with the aforementioned symptoms. He was initially hemodynamically stable but on presentation his blood pressure dropped to 88/47, he was given 2.8L but his blood pressure remained low at 89/50 and he was started on levophed    In the ED:  VS:   T 102.5, , Bp 131/85==>88/47==>89/40, spO2 97  Labs: WBC 10.47, Hb 9.2 (baseline), Plts 79, PT 19.5, INR 1.67, Na 126, Cl 90, Cr 1.72 bili 1.5,   UA negative nitrites and positive Leuk esterase, Covid negative, CXR wnl  He was given Acetaminophen and ibuprofen, Ceftriaxone, Zosyn, Bactrim  NS 2.8L and started Levophed   He had 2L UOP in [facility]   Blood and urine cultures were collected    (09 Sep 2022 21:30)                            9.3    5.45  )-----------( 127      ( 14 Sep 2022 05:30 )             28.3       09-14    130<L>  |  94<L>  |  13  ----------------------------<  137<H>  4.3   |  27  |  0.76    Ca    9.2      14 Sep 2022 05:30  Phos  2.8     09-14  Mg     1.9     09-14      Vital Signs Last 24 Hrs  T(C): 36.4 (14 Sep 2022 12:00), Max: 37 (13 Sep 2022 21:00)  T(F): 97.5 (14 Sep 2022 12:00), Max: 98.6 (13 Sep 2022 21:00)  HR: 88 (14 Sep 2022 12:00) (88 - 100)  BP: 117/71 (14 Sep 2022 12:00) (109/68 - 119/71)  BP(mean): --  RR: 19 (14 Sep 2022 12:00) (17 - 19)  SpO2: 100% (14 Sep 2022 12:00) (95% - 100%)    Parameters below as of 13 Sep 2022 21:00  Patient On (Oxygen Delivery Method): room air        MEDICATIONS  (STANDING):  cefTRIAXone   IVPB 2000 milliGRAM(s) IV Intermittent every 24 hours  dextrose 5%. 1000 milliLiter(s) (50 mL/Hr) IV Continuous <Continuous>  dextrose 5%. 1000 milliLiter(s) (100 mL/Hr) IV Continuous <Continuous>  dextrose 50% Injectable 25 Gram(s) IV Push once  dextrose 50% Injectable 12.5 Gram(s) IV Push once  dextrose 50% Injectable 25 Gram(s) IV Push once  folic acid 1 milliGRAM(s) Oral daily  glucagon  Injectable 1 milliGRAM(s) IntraMuscular once  insulin lispro (ADMELOG) corrective regimen sliding scale   SubCutaneous Before meals and at bedtime  midodrine 5 milliGRAM(s) Oral every 8 hours  multivitamin/minerals 1 Tablet(s) Oral daily  nicotine -  14 mG/24Hr(s) Patch 1 Patch Transdermal daily  oxyCODONE    IR 10 milliGRAM(s) Oral three times a day  pantoprazole    Tablet 40 milliGRAM(s) Oral before breakfast  thiamine 100 milliGRAM(s) Oral daily    MEDICATIONS  (PRN):  dextrose Oral Gel 15 Gram(s) Oral once PRN Blood Glucose LESS THAN 70 milliGRAM(s)/deciliter  lactulose Syrup 20 Gram(s) Oral every 12 hours PRN Titrate 2-3 BM  melatonin 3 milliGRAM(s) Oral at bedtime PRN Sleep      Initial Physical Therapy Functional Status Assessment :       Previous Level of Function:     · Ambulation Skills	independent  · Transfer Skills	independent  · ADL Skills	independent  · Work/Leisure Activity	independent  · Additional Comments	pt states he lives in a shelter, elevator. Has a cane but doesn't really use it.    Cognitive Status Examination:   · Orientation	oriented to person, place, time and situation  · Level of Consciousness	alert  · Follows Commands and Answers Questions	100% of the time  · Personal Safety and Judgment	intact    Range of Motion Exam:   · Range of Motion Examination	no ROM deficits were identified    Manual Muscle Testing:   · Manual Muscle Testing Results	no strength deficits were identified    Bed Mobility: Sit to Supine:     · Level of [place]	independent    Bed Mobility: Supine to Sit:     · Level of [place]	independent    Transfer: Sit to Stand:     · Level of [place]	independent  · Weight-Bearing Restrictions	full weight-bearing    Transfer: Stand to Sit:     · Level of [place]	independent  · Weight-Bearing Restrictions	full weight-bearing    Gait Skills:     · Level of [place]	independent  · Weight-Bearing Restrictions	full weight-bearing  · Gait Distance	100 feet    Gait Analysis:     · Gait Pattern Used	2-point gait    Balance Skills Assessment:     · Sitting Balance: Static	good balance  · Sitting Balance: Dynamic	good balance  · Sit-to-Stand Balance	good balance  · Standing Balance: Static	good balance  · Standing Balance: Dynamic	good balance    Sensory Examination:   Sensory Examination:    Grossly Intact:   · Gross Sensory Examination	Grossly Intact        PM&R Impression : as above    Current Disposition Plan Recommendations :    d/c home with no post discharge rehab needs              Physical Medicine and Rehabilitation Progress Note :      Patient is a 51y old  Male who presents with a chief complaint of septic shock (13 Sep 2022 16:44)      HPI:  52 y/o man with PMH of cirrhosis, DM, TENZIN (no longer on CPAP due to insurance issues), asthma, PUD, abdominal hernia (per patient), chronic back pain, Hx of gastric bypass, and Hx of multiple paracentesis to abdomen presenting from his shelter for rigors, abdominal pain, dizziness, nausea, SOB and somnolence. He said this started on 09/07 after being discharged from [facility]. He developed RUQ, LUQ and epigastric, sharp/stabbing, non radiating abdominal pain that he rates 9/10, along with dizziness and difficulty ambulating as he would feel like he is about to "pass out". The patient has lost consciousness a few times in the past while ambulating, but has not lost consciousness leading up to this admission and never sustained trauma to his head. He has been nauseous since discharge and has not eaten for the past few days. He also gets short of breath on excretion and palpitations, but has not had any CP, changes in vision, changes in hearing, headaches, no easy bruising or bleeding, no hematuria or hematochezia, no pain with urination or urgency, but frequency was present, no constipation or diarrhea. He does admit to having lower extremity edema that requires him to elevate his legs when sleeping, lower extremity neuropathic pain for which he takes gabapentin and back pain for which he takes oxycodone.   Of note he usually follows at [facility] where he gets his medication refills and paracentesis every few months. He was admitted on September 4th to [facility] for generalized weakness, fatigue, decreased appetite, generalized abdominal discomfort, at the time he had some insurance issues which prevented him from getting his med refill and paracentesis for 2 months. It was noted that during transport from [facility] to [facility] he was hypotensive Bp 80/46, was asymptomatic, was given 1L NS which did not improve his Bp. His utox was positive for THC and cocaine and was found to have an LINNETTE (Cr 3.5), nephrology and GI consulted and given albumin (100cc) with improvement in Cr (to 1.22 on 9/6), UOP and Bp. CT A/P showed hepatic cirrhosis, small ascites, spenomegaly, anasarca suggesting portal hypertension/volume overload. His US doppler of the abdomen showed normal flow in IVC, portal, hepatic and splenic veins.  There were no concerns for SBP (no WBC count or Fevers), did not receive paracentesis (minimal ascites not amenable to drainage according to IR) and no abx were started. He developed asterixis on 9/6 was started on lactulose and discharged w/ plan to f/u with GI in a week to resume spironolactone which was stopped on D/c. His MELD score on discharge was 23 on dialysis. However, urine culture from 9/4 grew ESBL.     On this visit, he arrived to [facility] from his shelter by ambulance with the aforementioned symptoms. He was initially hemodynamically stable but on presentation his blood pressure dropped to 88/47, he was given 2.8L but his blood pressure remained low at 89/50 and he was started on levophed    In the ED:  VS:   T 102.5, , Bp 131/85==>88/47==>89/40, spO2 97  Labs: WBC 10.47, Hb 9.2 (baseline), Plts 79, PT 19.5, INR 1.67, Na 126, Cl 90, Cr 1.72 bili 1.5,   UA negative nitrites and positive Leuk esterase, Covid negative, CXR wnl  He was given Acetaminophen and ibuprofen, Ceftriaxone, Zosyn, Bactrim  NS 2.8L and started Levophed   He had 2L UOP in [facility]   Blood and urine cultures were collected    (09 Sep 2022 21:30)                            9.3    5.45  )-----------( 127      ( 14 Sep 2022 05:30 )             28.3       09-14    130<L>  |  94<L>  |  13  ----------------------------<  137<H>  4.3   |  27  |  0.76    Ca    9.2      14 Sep 2022 05:30  Phos  2.8     09-14  Mg     1.9     09-14      Vital Signs Last 24 Hrs  T(C): 36.4 (14 Sep 2022 12:00), Max: 37 (13 Sep 2022 21:00)  T(F): 97.5 (14 Sep 2022 12:00), Max: 98.6 (13 Sep 2022 21:00)  HR: 88 (14 Sep 2022 12:00) (88 - 100)  BP: 117/71 (14 Sep 2022 12:00) (109/68 - 119/71)  BP(mean): --  RR: 19 (14 Sep 2022 12:00) (17 - 19)  SpO2: 100% (14 Sep 2022 12:00) (95% - 100%)    Parameters below as of 13 Sep 2022 21:00  Patient On (Oxygen Delivery Method): room air        MEDICATIONS  (STANDING):  cefTRIAXone   IVPB 2000 milliGRAM(s) IV Intermittent every 24 hours  dextrose 5%. 1000 milliLiter(s) (50 mL/Hr) IV Continuous <Continuous>  dextrose 5%. 1000 milliLiter(s) (100 mL/Hr) IV Continuous <Continuous>  dextrose 50% Injectable 25 Gram(s) IV Push once  dextrose 50% Injectable 12.5 Gram(s) IV Push once  dextrose 50% Injectable 25 Gram(s) IV Push once  folic acid 1 milliGRAM(s) Oral daily  glucagon  Injectable 1 milliGRAM(s) IntraMuscular once  insulin lispro (ADMELOG) corrective regimen sliding scale   SubCutaneous Before meals and at bedtime  midodrine 5 milliGRAM(s) Oral every 8 hours  multivitamin/minerals 1 Tablet(s) Oral daily  nicotine -  14 mG/24Hr(s) Patch 1 Patch Transdermal daily  oxyCODONE    IR 10 milliGRAM(s) Oral three times a day  pantoprazole    Tablet 40 milliGRAM(s) Oral before breakfast  thiamine 100 milliGRAM(s) Oral daily    MEDICATIONS  (PRN):  dextrose Oral Gel 15 Gram(s) Oral once PRN Blood Glucose LESS THAN 70 milliGRAM(s)/deciliter  lactulose Syrup 20 Gram(s) Oral every 12 hours PRN Titrate 2-3 BM  melatonin 3 milliGRAM(s) Oral at bedtime PRN Sleep      Initial Physical Therapy Functional Status Assessment :       Previous Level of Function:     · Ambulation Skills	independent  · Transfer Skills	independent  · ADL Skills	independent  · Work/Leisure Activity	independent  · Additional Comments	pt states he lives in a shelter, elevator. Has a cane but doesn't really use it.    Cognitive Status Examination:   · Orientation	oriented to person, place, time and situation  · Level of Consciousness	alert  · Follows Commands and Answers Questions	100% of the time  · Personal Safety and Judgment	intact    Range of Motion Exam:   · Range of Motion Examination	no ROM deficits were identified    Manual Muscle Testing:   · Manual Muscle Testing Results	no strength deficits were identified    Bed Mobility: Sit to Supine:     · Level of [place]	independent    Bed Mobility: Supine to Sit:     · Level of [place]	independent    Transfer: Sit to Stand:     · Level of [place]	independent  · Weight-Bearing Restrictions	full weight-bearing    Transfer: Stand to Sit:     · Level of [place]	independent  · Weight-Bearing Restrictions	full weight-bearing    Gait Skills:     · Level of [place]	independent  · Weight-Bearing Restrictions	full weight-bearing  · Gait Distance	100 feet    Gait Analysis:     · Gait Pattern Used	2-point gait    Balance Skills Assessment:     · Sitting Balance: Static	good balance  · Sitting Balance: Dynamic	good balance  · Sit-to-Stand Balance	good balance  · Standing Balance: Static	good balance  · Standing Balance: Dynamic	good balance    Sensory Examination:   Sensory Examination:    Grossly Intact:   · Gross Sensory Examination	Grossly Intact        PM&R Impression : as above    Current Disposition Plan Recommendations :    d/c home with no post discharge rehab needs              Physical Medicine and Rehabilitation Progress Note :      Patient is a 51y old  Male who presents with a chief complaint of septic shock (13 Sep 2022 16:44)      HPI:  52 y/o man with PMH of cirrhosis, DM, TENZIN (no longer on CPAP due to insurance issues), asthma, PUD, abdominal hernia (per patient), chronic back pain, Hx of gastric bypass, and Hx of multiple paracentesis to abdomen presenting from his shelter for rigors, abdominal pain, dizziness, nausea, SOB and somnolence. He said this started on 09/07 after being discharged from [facility]. He developed RUQ, LUQ and epigastric, sharp/stabbing, non radiating abdominal pain that he rates 9/10, along with dizziness and difficulty ambulating as he would feel like he is about to "pass out". The patient has lost consciousness a few times in the past while ambulating, but has not lost consciousness leading up to this admission and never sustained trauma to his head. He has been nauseous since discharge and has not eaten for the past few days. He also gets short of breath on excretion and palpitations, but has not had any CP, changes in vision, changes in hearing, headaches, no easy bruising or bleeding, no hematuria or hematochezia, no pain with urination or urgency, but frequency was present, no constipation or diarrhea. He does admit to having lower extremity edema that requires him to elevate his legs when sleeping, lower extremity neuropathic pain for which he takes gabapentin and back pain for which he takes oxycodone.   Of note he usually follows at [facility] where he gets his medication refills and paracentesis every few months. He was admitted on September 4th to [facility] for generalized weakness, fatigue, decreased appetite, generalized abdominal discomfort, at the time he had some insurance issues which prevented him from getting his med refill and paracentesis for 2 months. It was noted that during transport from [facility] to [facility] he was hypotensive Bp 80/46, was asymptomatic, was given 1L NS which did not improve his Bp. His utox was positive for THC and cocaine and was found to have an LINNETTE (Cr 3.5), nephrology and GI consulted and given albumin (100cc) with improvement in Cr (to 1.22 on 9/6), UOP and Bp. CT A/P showed hepatic cirrhosis, small ascites, spenomegaly, anasarca suggesting portal hypertension/volume overload. His US doppler of the abdomen showed normal flow in IVC, portal, hepatic and splenic veins.  There were no concerns for SBP (no WBC count or Fevers), did not receive paracentesis (minimal ascites not amenable to drainage according to IR) and no abx were started. He developed asterixis on 9/6 was started on lactulose and discharged w/ plan to f/u with GI in a week to resume spironolactone which was stopped on D/c. His MELD score on discharge was 23 on dialysis. However, urine culture from 9/4 grew ESBL.     On this visit, he arrived to [facility] from his shelter by ambulance with the aforementioned symptoms. He was initially hemodynamically stable but on presentation his blood pressure dropped to 88/47, he was given 2.8L but his blood pressure remained low at 89/50 and he was started on levophed    In the ED:  VS:   T 102.5, , Bp 131/85==>88/47==>89/40, spO2 97  Labs: WBC 10.47, Hb 9.2 (baseline), Plts 79, PT 19.5, INR 1.67, Na 126, Cl 90, Cr 1.72 bili 1.5,   UA negative nitrites and positive Leuk esterase, Covid negative, CXR wnl  He was given Acetaminophen and ibuprofen, Ceftriaxone, Zosyn, Bactrim  NS 2.8L and started Levophed   He had 2L UOP in [facility]   Blood and urine cultures were collected    (09 Sep 2022 21:30)                            9.3    5.45  )-----------( 127      ( 14 Sep 2022 05:30 )             28.3       09-14    130<L>  |  94<L>  |  13  ----------------------------<  137<H>  4.3   |  27  |  0.76    Ca    9.2      14 Sep 2022 05:30  Phos  2.8     09-14  Mg     1.9     09-14      Vital Signs Last 24 Hrs  T(C): 36.4 (14 Sep 2022 12:00), Max: 37 (13 Sep 2022 21:00)  T(F): 97.5 (14 Sep 2022 12:00), Max: 98.6 (13 Sep 2022 21:00)  HR: 88 (14 Sep 2022 12:00) (88 - 100)  BP: 117/71 (14 Sep 2022 12:00) (109/68 - 119/71)  BP(mean): --  RR: 19 (14 Sep 2022 12:00) (17 - 19)  SpO2: 100% (14 Sep 2022 12:00) (95% - 100%)    Parameters below as of 13 Sep 2022 21:00  Patient On (Oxygen Delivery Method): room air        MEDICATIONS  (STANDING):  cefTRIAXone   IVPB 2000 milliGRAM(s) IV Intermittent every 24 hours  dextrose 5%. 1000 milliLiter(s) (50 mL/Hr) IV Continuous <Continuous>  dextrose 5%. 1000 milliLiter(s) (100 mL/Hr) IV Continuous <Continuous>  dextrose 50% Injectable 25 Gram(s) IV Push once  dextrose 50% Injectable 12.5 Gram(s) IV Push once  dextrose 50% Injectable 25 Gram(s) IV Push once  folic acid 1 milliGRAM(s) Oral daily  glucagon  Injectable 1 milliGRAM(s) IntraMuscular once  insulin lispro (ADMELOG) corrective regimen sliding scale   SubCutaneous Before meals and at bedtime  midodrine 5 milliGRAM(s) Oral every 8 hours  multivitamin/minerals 1 Tablet(s) Oral daily  nicotine -  14 mG/24Hr(s) Patch 1 Patch Transdermal daily  oxyCODONE    IR 10 milliGRAM(s) Oral three times a day  pantoprazole    Tablet 40 milliGRAM(s) Oral before breakfast  thiamine 100 milliGRAM(s) Oral daily    MEDICATIONS  (PRN):  dextrose Oral Gel 15 Gram(s) Oral once PRN Blood Glucose LESS THAN 70 milliGRAM(s)/deciliter  lactulose Syrup 20 Gram(s) Oral every 12 hours PRN Titrate 2-3 BM  melatonin 3 milliGRAM(s) Oral at bedtime PRN Sleep      Initial Physical Therapy Functional Status Assessment :       Previous Level of Function:     · Ambulation Skills	independent  · Transfer Skills	independent  · ADL Skills	independent  · Work/Leisure Activity	independent  · Additional Comments	pt states he lives in a shelter, elevator. Has a cane but doesn't really use it.    Cognitive Status Examination:   · Orientation	oriented to person, place, time and situation  · Level of Consciousness	alert  · Follows Commands and Answers Questions	100% of the time  · Personal Safety and Judgment	intact    Range of Motion Exam:   · Range of Motion Examination	no ROM deficits were identified    Manual Muscle Testing:   · Manual Muscle Testing Results	no strength deficits were identified    Bed Mobility: Sit to Supine:     · Level of [place]	independent    Bed Mobility: Supine to Sit:     · Level of [place]	independent    Transfer: Sit to Stand:     · Level of [place]	independent  · Weight-Bearing Restrictions	full weight-bearing    Transfer: Stand to Sit:     · Level of [place]	independent  · Weight-Bearing Restrictions	full weight-bearing    Gait Skills:     · Level of [place]	independent  · Weight-Bearing Restrictions	full weight-bearing  · Gait Distance	100 feet    Gait Analysis:     · Gait Pattern Used	2-point gait    Balance Skills Assessment:     · Sitting Balance: Static	good balance  · Sitting Balance: Dynamic	good balance  · Sit-to-Stand Balance	good balance  · Standing Balance: Static	good balance  · Standing Balance: Dynamic	good balance    Sensory Examination:   Sensory Examination:    Grossly Intact:   · Gross Sensory Examination	Grossly Intact        PM&R Impression : as above    Current Disposition Plan Recommendations :    d/c home with no post discharge rehab needs

## 2022-09-14 NOTE — PROGRESS NOTE ADULT - SUBJECTIVE AND OBJECTIVE BOX
infectious diseases progress note    INTERVAL HPI/OVERNIGHT EVENTS: Patient feels well this morning denying acute complaints including nausea, vomiting, diarrhea, chest pain, SOB, abdominal pain or headache.     ROS:  CONSTITUTIONAL:  Negative fever or chills, feels well, good appetite  EYES:  Negative  blurry vision or double vision  CARDIOVASCULAR:  Negative for chest pain or palpitations  RESPIRATORY:  Negative for cough, wheezing, or SOB   GASTROINTESTINAL:  Negative for nausea, vomiting, diarrhea, constipation, or abdominal pain  GENITOURINARY:  Negative frequency, urgency or dysuria  NEUROLOGIC:  No headache, confusion, dizziness, lightheadedness    Allergies    No Known Allergies    Intolerances        ANTIBIOTICS/RELEVANT:  antimicrobials  cefTRIAXone   IVPB 2000 milliGRAM(s) IV Intermittent every 24 hours    immunologic:    OTHER:  dextrose 5%. 1000 milliLiter(s) IV Continuous <Continuous>  dextrose 5%. 1000 milliLiter(s) IV Continuous <Continuous>  dextrose 50% Injectable 25 Gram(s) IV Push once  dextrose 50% Injectable 12.5 Gram(s) IV Push once  dextrose 50% Injectable 25 Gram(s) IV Push once  dextrose Oral Gel 15 Gram(s) Oral once PRN  folic acid 1 milliGRAM(s) Oral daily  glucagon  Injectable 1 milliGRAM(s) IntraMuscular once  hemorrhoidal Ointment 1 Application(s) Rectal three times a day PRN  insulin lispro (ADMELOG) corrective regimen sliding scale   SubCutaneous Before meals and at bedtime  lactulose Syrup 20 Gram(s) Oral every 12 hours  melatonin 3 milliGRAM(s) Oral at bedtime PRN  midodrine 5 milliGRAM(s) Oral every 8 hours  multivitamin/minerals 1 Tablet(s) Oral daily  nicotine -  14 mG/24Hr(s) Patch 1 Patch Transdermal daily  oxyCODONE    IR 10 milliGRAM(s) Oral three times a day  pantoprazole    Tablet 40 milliGRAM(s) Oral before breakfast  thiamine 100 milliGRAM(s) Oral daily      Objective:  Vital Signs Last 24 Hrs  T(C): 36.4 (14 Sep 2022 12:00), Max: 37 (13 Sep 2022 21:00)  T(F): 97.5 (14 Sep 2022 12:00), Max: 98.6 (13 Sep 2022 21:00)  HR: 88 (14 Sep 2022 12:00) (88 - 100)  BP: 117/71 (14 Sep 2022 12:00) (109/68 - 119/71)  BP(mean): --  RR: 19 (14 Sep 2022 12:00) (17 - 19)  SpO2: 100% (14 Sep 2022 12:00) (95% - 100%)    Parameters below as of 13 Sep 2022 21:00  Patient On (Oxygen Delivery Method): room air      PHYSICAL EXAM:  Constitutional: NAD, sitting at bedside, chronically ill appearing   HEENT: PERRLA, EOMI, MMM, poor dentition, no erythema or ulcerations   Neck: No LAD, No JVD  Chest: gynecomastia   Back: Normal spine flexure, No CVA tenderness  Respiratory: CTAB  Cardiovascular: S1 and S2, RRR, no M/G/R  Gastrointestinal: BS+, soft, mild distention. NT to palpation. Less impressive exam than previously noted however still with tenderness   Extremities: No peripheral edema  Vascular: 2+ peripheral pulses  Neurological: A/O x 3, no focal deficits  Psychiatric: Normal mood, normal affect  Musculoskeletal: 5/5 strength b/l upper and lower extremities  Skin: No rash noted         LABS:                        9.3    5.45  )-----------( 127      ( 14 Sep 2022 05:30 )             28.3     09-14    130<L>  |  94<L>  |  13  ----------------------------<  137<H>  4.3   |  27  |  0.76    Ca    9.2      14 Sep 2022 05:30  Phos  2.8     09-14  Mg     1.9     09-14        MICROBIOLOGY:        RADIOLOGY & ADDITIONAL STUDIES:

## 2022-09-15 LAB
ANION GAP SERPL CALC-SCNC: 8 MMOL/L — SIGNIFICANT CHANGE UP (ref 5–17)
BUN SERPL-MCNC: 12 MG/DL — SIGNIFICANT CHANGE UP (ref 7–23)
CALCIUM SERPL-MCNC: 9 MG/DL — SIGNIFICANT CHANGE UP (ref 8.4–10.5)
CHLORIDE SERPL-SCNC: 95 MMOL/L — LOW (ref 96–108)
CO2 SERPL-SCNC: 27 MMOL/L — SIGNIFICANT CHANGE UP (ref 22–31)
CREAT SERPL-MCNC: 0.82 MG/DL — SIGNIFICANT CHANGE UP (ref 0.5–1.3)
CULTURE RESULTS: SIGNIFICANT CHANGE UP
EGFR: 105 ML/MIN/1.73M2 — SIGNIFICANT CHANGE UP
GLUCOSE BLDC GLUCOMTR-MCNC: 139 MG/DL — HIGH (ref 70–99)
GLUCOSE BLDC GLUCOMTR-MCNC: 146 MG/DL — HIGH (ref 70–99)
GLUCOSE BLDC GLUCOMTR-MCNC: 153 MG/DL — HIGH (ref 70–99)
GLUCOSE BLDC GLUCOMTR-MCNC: 196 MG/DL — HIGH (ref 70–99)
GLUCOSE SERPL-MCNC: 133 MG/DL — HIGH (ref 70–99)
HCT VFR BLD CALC: 26.7 % — LOW (ref 39–50)
HGB BLD-MCNC: 8.5 G/DL — LOW (ref 13–17)
MAGNESIUM SERPL-MCNC: 1.8 MG/DL — SIGNIFICANT CHANGE UP (ref 1.6–2.6)
MCHC RBC-ENTMCNC: 27.4 PG — SIGNIFICANT CHANGE UP (ref 27–34)
MCHC RBC-ENTMCNC: 31.8 GM/DL — LOW (ref 32–36)
MCV RBC AUTO: 86.1 FL — SIGNIFICANT CHANGE UP (ref 80–100)
NRBC # BLD: 0 /100 WBCS — SIGNIFICANT CHANGE UP (ref 0–0)
PHOSPHATE SERPL-MCNC: 2.7 MG/DL — SIGNIFICANT CHANGE UP (ref 2.5–4.5)
PLATELET # BLD AUTO: 132 K/UL — LOW (ref 150–400)
POTASSIUM SERPL-MCNC: 4.2 MMOL/L — SIGNIFICANT CHANGE UP (ref 3.5–5.3)
POTASSIUM SERPL-SCNC: 4.2 MMOL/L — SIGNIFICANT CHANGE UP (ref 3.5–5.3)
RBC # BLD: 3.1 M/UL — LOW (ref 4.2–5.8)
RBC # FLD: 15.4 % — HIGH (ref 10.3–14.5)
SODIUM SERPL-SCNC: 130 MMOL/L — LOW (ref 135–145)
SPECIMEN SOURCE: SIGNIFICANT CHANGE UP
WBC # BLD: 4.95 K/UL — SIGNIFICANT CHANGE UP (ref 3.8–10.5)
WBC # FLD AUTO: 4.95 K/UL — SIGNIFICANT CHANGE UP (ref 3.8–10.5)

## 2022-09-15 PROCEDURE — 99233 SBSQ HOSP IP/OBS HIGH 50: CPT | Mod: GC

## 2022-09-15 RX ORDER — ENOXAPARIN SODIUM 100 MG/ML
40 INJECTION SUBCUTANEOUS EVERY 12 HOURS
Refills: 0 | Status: DISCONTINUED | OUTPATIENT
Start: 2022-09-15 | End: 2022-09-20

## 2022-09-15 RX ORDER — LANOLIN ALCOHOL/MO/W.PET/CERES
3 CREAM (GRAM) TOPICAL ONCE
Refills: 0 | Status: COMPLETED | OUTPATIENT
Start: 2022-09-15 | End: 2022-09-15

## 2022-09-15 RX ADMIN — Medication 1 PATCH: at 19:07

## 2022-09-15 RX ADMIN — Medication 100 MILLIGRAM(S): at 13:41

## 2022-09-15 RX ADMIN — Medication 3 MILLIGRAM(S): at 01:50

## 2022-09-15 RX ADMIN — Medication 1 PATCH: at 07:13

## 2022-09-15 RX ADMIN — LACTULOSE 20 GRAM(S): 10 SOLUTION ORAL at 18:32

## 2022-09-15 RX ADMIN — Medication 1 MILLIGRAM(S): at 13:42

## 2022-09-15 RX ADMIN — OXYCODONE HYDROCHLORIDE 10 MILLIGRAM(S): 5 TABLET ORAL at 13:42

## 2022-09-15 RX ADMIN — Medication 1 TABLET(S): at 13:41

## 2022-09-15 RX ADMIN — Medication 3 MILLIGRAM(S): at 21:15

## 2022-09-15 RX ADMIN — OXYCODONE HYDROCHLORIDE 10 MILLIGRAM(S): 5 TABLET ORAL at 07:04

## 2022-09-15 RX ADMIN — Medication 1 PATCH: at 13:42

## 2022-09-15 RX ADMIN — CEFTRIAXONE 100 MILLIGRAM(S): 500 INJECTION, POWDER, FOR SOLUTION INTRAMUSCULAR; INTRAVENOUS at 15:54

## 2022-09-15 RX ADMIN — PANTOPRAZOLE SODIUM 40 MILLIGRAM(S): 20 TABLET, DELAYED RELEASE ORAL at 06:04

## 2022-09-15 RX ADMIN — ENOXAPARIN SODIUM 40 MILLIGRAM(S): 100 INJECTION SUBCUTANEOUS at 19:11

## 2022-09-15 RX ADMIN — Medication 2: at 13:53

## 2022-09-15 RX ADMIN — OXYCODONE HYDROCHLORIDE 10 MILLIGRAM(S): 5 TABLET ORAL at 21:15

## 2022-09-15 RX ADMIN — OXYCODONE HYDROCHLORIDE 10 MILLIGRAM(S): 5 TABLET ORAL at 06:04

## 2022-09-15 RX ADMIN — Medication 2: at 18:21

## 2022-09-15 RX ADMIN — MIDODRINE HYDROCHLORIDE 5 MILLIGRAM(S): 2.5 TABLET ORAL at 01:02

## 2022-09-15 RX ADMIN — OXYCODONE HYDROCHLORIDE 10 MILLIGRAM(S): 5 TABLET ORAL at 22:15

## 2022-09-15 NOTE — PROGRESS NOTE ADULT - SUBJECTIVE AND OBJECTIVE BOX
O/N Events:    Subjective/ROS: Patient seen and examined at bedside.     Denies Fever/Chills, HA, CP, SOB, n/v, changes in bowel/urinary habits.  12pt ROS otherwise negative.    VITALS  Vital Signs Last 24 Hrs  T(C): 36.9 (15 Sep 2022 00:39), Max: 36.9 (15 Sep 2022 00:39)  T(F): 98.4 (15 Sep 2022 00:39), Max: 98.4 (15 Sep 2022 00:39)  HR: 95 (15 Sep 2022 06:31) (88 - 109)  BP: 115/75 (15 Sep 2022 00:39) (101/66 - 117/71)  BP(mean): --  RR: 18 (15 Sep 2022 06:31) (18 - 19)  SpO2: 94% (15 Sep 2022 06:31) (93% - 100%)    Parameters below as of 15 Sep 2022 06:31  Patient On (Oxygen Delivery Method): room air        CAPILLARY BLOOD GLUCOSE      POCT Blood Glucose.: 151 mg/dL (14 Sep 2022 22:01)  POCT Blood Glucose.: 226 mg/dL (14 Sep 2022 17:25)  POCT Blood Glucose.: 134 mg/dL (14 Sep 2022 08:47)      PHYSICAL EXAM  General: NAD  Head: NC/AT; MMM; PERRL; EOMI;  Neck: Supple; no JVD  Respiratory: CTAB; no wheezes/rales/rhonchi  Cardiovascular: Regular rhythm/rate; S1/S2+, no murmurs, rubs gallops   Gastrointestinal: Soft; NTND; bowel sounds normal and present  Extremities: WWP; no edema/cyanosis  Neurological: A&Ox3, CNII-XII grossly intact; no obvious focal deficits    MEDICATIONS  (STANDING):  cefTRIAXone   IVPB 2000 milliGRAM(s) IV Intermittent every 24 hours  dextrose 5%. 1000 milliLiter(s) (50 mL/Hr) IV Continuous <Continuous>  dextrose 5%. 1000 milliLiter(s) (100 mL/Hr) IV Continuous <Continuous>  dextrose 50% Injectable 25 Gram(s) IV Push once  dextrose 50% Injectable 12.5 Gram(s) IV Push once  dextrose 50% Injectable 25 Gram(s) IV Push once  folic acid 1 milliGRAM(s) Oral daily  glucagon  Injectable 1 milliGRAM(s) IntraMuscular once  insulin lispro (ADMELOG) corrective regimen sliding scale   SubCutaneous Before meals and at bedtime  lactulose Syrup 20 Gram(s) Oral every 12 hours  multivitamin/minerals 1 Tablet(s) Oral daily  nicotine -  14 mG/24Hr(s) Patch 1 Patch Transdermal daily  oxyCODONE    IR 10 milliGRAM(s) Oral three times a day  pantoprazole    Tablet 40 milliGRAM(s) Oral before breakfast  thiamine 100 milliGRAM(s) Oral daily    MEDICATIONS  (PRN):  dextrose Oral Gel 15 Gram(s) Oral once PRN Blood Glucose LESS THAN 70 milliGRAM(s)/deciliter  hemorrhoidal Ointment 1 Application(s) Rectal three times a day PRN hemorrhoids  melatonin 3 milliGRAM(s) Oral at bedtime PRN Sleep      No Known Allergies      LABS                        9.3    5.45  )-----------( 127      ( 14 Sep 2022 05:30 )             28.3     09-14    130<L>  |  94<L>  |  13  ----------------------------<  137<H>  4.3   |  27  |  0.76    Ca    9.2      14 Sep 2022 05:30  Phos  2.8     09-14  Mg     1.9     09-14                Culture - Body Fluid with Gram Stain (collected 09-14-22 @ 12:45)  Source: Peritoneal peritoneal  Gram Stain (09-14-22 @ 22:18):    No organisms seen    Rare WBC's        IMAGING/EKG/ETC   O/N Events: naeo    Subjective/ROS: Patient seen and examined at bedside, waiting for his breakfast. Feels much better this morning with less "abdominal fullness." No complaints other than chronic R shoulder pain. Denies Fever/Chills, HA, CP, SOB, n/v, changes in bowel/urinary habits.  12pt ROS otherwise negative.    VITALS  Vital Signs Last 24 Hrs  T(C): 36.9 (15 Sep 2022 00:39), Max: 36.9 (15 Sep 2022 00:39)  T(F): 98.4 (15 Sep 2022 00:39), Max: 98.4 (15 Sep 2022 00:39)  HR: 95 (15 Sep 2022 06:31) (88 - 109)  BP: 115/75 (15 Sep 2022 00:39) (101/66 - 117/71)  BP(mean): --  RR: 18 (15 Sep 2022 06:31) (18 - 19)  SpO2: 94% (15 Sep 2022 06:31) (93% - 100%)    Parameters below as of 15 Sep 2022 06:31  Patient On (Oxygen Delivery Method): room air        CAPILLARY BLOOD GLUCOSE      POCT Blood Glucose.: 151 mg/dL (14 Sep 2022 22:01)  POCT Blood Glucose.: 226 mg/dL (14 Sep 2022 17:25)  POCT Blood Glucose.: 134 mg/dL (14 Sep 2022 08:47)    PHYSICAL EXAM  General: NAD, conversive  Head: NC/AT; MMM; PERRL; EOMI; missing teeth   Neck: Supple; no JVD  Respiratory: CTAB  Cardiovascular: Regular rhythm/rate; S1/S2+, no murmurs, rubs gallops   Gastrointestinal: Soft; non specific abdominal tenderness. Loos skin 2/2 weight loss from bariatric surgery   Extremities: WWP; no edema/cyanosis  Neurological: A&Ox3, CNII-XII grossly intact; no obvious focal deficits    MEDICATIONS  (STANDING):  cefTRIAXone   IVPB 2000 milliGRAM(s) IV Intermittent every 24 hours  dextrose 5%. 1000 milliLiter(s) (50 mL/Hr) IV Continuous <Continuous>  dextrose 5%. 1000 milliLiter(s) (100 mL/Hr) IV Continuous <Continuous>  dextrose 50% Injectable 25 Gram(s) IV Push once  dextrose 50% Injectable 12.5 Gram(s) IV Push once  dextrose 50% Injectable 25 Gram(s) IV Push once  folic acid 1 milliGRAM(s) Oral daily  glucagon  Injectable 1 milliGRAM(s) IntraMuscular once  insulin lispro (ADMELOG) corrective regimen sliding scale   SubCutaneous Before meals and at bedtime  lactulose Syrup 20 Gram(s) Oral every 12 hours  multivitamin/minerals 1 Tablet(s) Oral daily  nicotine -  14 mG/24Hr(s) Patch 1 Patch Transdermal daily  oxyCODONE    IR 10 milliGRAM(s) Oral three times a day  pantoprazole    Tablet 40 milliGRAM(s) Oral before breakfast  thiamine 100 milliGRAM(s) Oral daily    MEDICATIONS  (PRN):  dextrose Oral Gel 15 Gram(s) Oral once PRN Blood Glucose LESS THAN 70 milliGRAM(s)/deciliter  hemorrhoidal Ointment 1 Application(s) Rectal three times a day PRN hemorrhoids  melatonin 3 milliGRAM(s) Oral at bedtime PRN Sleep      No Known Allergies      LABS                        9.3    5.45  )-----------( 127      ( 14 Sep 2022 05:30 )             28.3     09-14    130<L>  |  94<L>  |  13  ----------------------------<  137<H>  4.3   |  27  |  0.76    Ca    9.2      14 Sep 2022 05:30  Phos  2.8     09-14  Mg     1.9     09-14                Culture - Body Fluid with Gram Stain (collected 09-14-22 @ 12:45)  Source: Peritoneal peritoneal  Gram Stain (09-14-22 @ 22:18):    No organisms seen    Rare WBC's        IMAGING/EKG/ETC

## 2022-09-15 NOTE — PROGRESS NOTE ADULT - ASSESSMENT
50 y/o man with PMH significant for cirrhosis, DM with previously untreated ESBL UTI, presenting with rigors, AMS, fever and hemodynamic instability with elevated WBC count likely due to multiorgan failure 2/2 untreated ESBL UTI and E.Coli bacteremia.  52 y/o man with PMH significant for cirrhosis, DM with previously untreated ESBL UTI, presenting with rigors, AMS, fever and hemodynamic instability with elevated WBC count likely due to multiorgan failure 2/2 untreated ESBL UTI and E.Coli bacteremia.  50 y/o man with PMH significant for cirrhosis, DM with previously untreated ESBL UTI, presenting with rigors, AMS, fever and hemodynamic instability with elevated WBC count likely due to multiorgan failure 2/2 SBP i/s/o alcoholic cirrhosis.

## 2022-09-15 NOTE — PROGRESS NOTE ADULT - PROBLEM SELECTOR PLAN 4
Ab ultrasound from previous admission w/ cirrhosis: PT/INR slightly elevated. S/p paracentesis 900 cc removed.     Plan:   - C/w lactulose q8 prn w/ goal of 2-3 bm per day - PP 40 mg qd

## 2022-09-15 NOTE — PROGRESS NOTE ADULT - PROBLEM SELECTOR PLAN 7
A1C @6 on Sep 5th   Pt not on any meds at home  Insulin sliding scale. F: tolerating PO  E: Replete PRN   DVT propylaxis: Lovenox  GI prophylaxis: Pantoprazole

## 2022-09-15 NOTE — PROGRESS NOTE ADULT - PROBLEM SELECTOR PLAN 6
Hx of alcoholism, cirrhosis, malnourished     plan:  - c/w multivitamin, folic acid A1C @6 on Sep 5th   Pt not on any meds at home  Insulin sliding scale.

## 2022-09-15 NOTE — PROGRESS NOTE ADULT - PROBLEM SELECTOR PLAN 2
No symptoms, inconsistent with E.Coli in blood cultures. See above. Ab ultrasound from previous admission w/ cirrhosis: PT/INR slightly elevated. S/p paracentesis 900 cc removed.     Plan:   - C/w lactulose q8 prn w/ goal of 2-3 bm per day

## 2022-09-15 NOTE — PROGRESS NOTE ADULT - PROBLEM SELECTOR PLAN 1
Repeat cultures from 09/10 are negative. ID on board. No fevers, still has abdominal pain w/ concern for E coli translocation as bacteria not same as previous UTI. CTAP positive for abdominal ascites, paracentesis this morning with 900 cc ebenezer fluid removed.     Plan:   - ceftriaxone 2 g IV q24  - will need midline, placement later this afternoon  - paracentesis today removed 950 cc - f/u results 2/2 SBP s/p paracentesis. Repeat cultures from 09/10 are negative.     Plan:   - ceftriaxone 2 g IV q24 (last dose on 09/16)  - start ciprofloxacin for SBP prophylaxis

## 2022-09-15 NOTE — PROGRESS NOTE ADULT - PROBLEM SELECTOR PLAN 8
F: tolerating PO, ondansetron for Nausea (PRN) QTc was 450   E: Replete PRN   DVT propylaxis: lovenox  GI prophylaxis: Pantoprazole
F: tolerating PO, ondansetron for Nausea (PRN) QTc was 450   E: Replete PRN   DVT propylaxis: SCDs  GI prophylaxis: Pantoprazole
F: tolerating PO, ondansetron for Nausea (PRN) QTc was 450   E: Replete PRN   DVT propylaxis: lovenox  GI prophylaxis: Pantoprazole
F: tolerating PO, ondansetron for Nausea (PRN) QTc was 450   E: Replete PRN   DVT propylaxis: Lovenox held i/s/o paracentesis with cirhossis elevated coags. Restart tomorrow.   GI prophylaxis: Pantoprazole
F: tolerating PO, ondansetron for Nausea (PRN) QTc was 450   E: Replete PRN   DVT propylaxis: Lovenox held i/s/o paracentesis with cirhossis elevated coags. Restart tomorrow.   GI prophylaxis: Pantoprazole
F: tolerating PO, ondansetron for Nausea (PRN) QTc was 450   E: Replete PRN   DVT propylaxis: SCDs  GI prophylaxis: Pantoprazole

## 2022-09-15 NOTE — PROGRESS NOTE ADULT - ATTENDING COMMENTS
50 y/o man with PMH significant for cirrhosis, DM who presented with septic shock and found to have E coli bacteremia with likely source SBP    #Ecoli bacteremia  - previous admit with UA + for ESBL Ecoli, was not treated at that time as patient asymptomatic  - Blood cultures are E coli but NOT ESBL  - ID consulted, appreciate recs  - stopped Ertapenem and started CTX 2gm  - CT A/P with increased ascites, no other sources of infection  - s/p paracentesis with likely SBP, did not technically meet criteria but patient was on abx for 5 days prior to tap  - Attempting to obtain collateral from [place] regarding prior paracenteses  - will complete CTX tomorrow, will dc on lifelong SBP ppx with Cipro.  Discussed with patient.  Cipro preferred to Bactrim as patient with frequent AKIs.  QTc 431 on 9/9    #septic shock  - patient presented with sepsis due to E coli infection requiring levophed now improving  - Abx as above  - Weaned off midodrine today  - hold all antihypertensives    #Cirrhosis  - due to EtOH abuse, no appreciable ascites on exam although limited due to habitus with excess skin due to patient's weight loss  - patient previously on Lasix and Spironolactone, now held due to hypotension  - c/w outpatient follow up with [facility].  Will likely not be able to restart diuretic therapy during this admission  - c/w lactulose  - SBP tx and ppx as above  - c/w DVT ppx with heparin  - outpatient GI follow up    #LINNETTE  - patient presented with Cr 1.7 from dc Cr 1.2, now downtrend to 0.9. Resolved  - was likely pre renal due to sepsis    #Anemia  - Likely due to chronic inflammation, mild KELLY    #Thrombocytopenia  - stable, no signs of bleeding    #DM - c/w ISS  #HTN - now hypotensive, hold all BP meds  #Tobacco abuse - nicotine replacement    Remainder of plan as above 52 y/o man with PMH significant for cirrhosis, DM who presented with septic shock and found to have E coli bacteremia with likely source SBP    #Ecoli bacteremia  - previous admit with UA + for ESBL Ecoli, was not treated at that time as patient asymptomatic  - Blood cultures are E coli but NOT ESBL  - ID consulted, appreciate recs  - stopped Ertapenem and started CTX 2gm  - CT A/P with increased ascites, no other sources of infection  - s/p paracentesis with likely SBP, did not technically meet criteria but patient was on abx for 5 days prior to tap  - Attempting to obtain collateral from [place] regarding prior paracenteses  - will complete CTX tomorrow, will dc on lifelong SBP ppx with Cipro.  Discussed with patient.  Cipro preferred to Bactrim as patient with frequent AKIs.  QTc 431 on 9/9    #septic shock  - patient presented with sepsis due to E coli infection requiring levophed now improving  - Abx as above  - Weaned off midodrine today  - hold all antihypertensives    #Cirrhosis  - due to EtOH abuse, no appreciable ascites on exam although limited due to habitus with excess skin due to patient's weight loss  - patient previously on Lasix and Spironolactone, now held due to hypotension  - c/w outpatient follow up with [facility].  Will likely not be able to restart diuretic therapy during this admission  - c/w lactulose  - SBP tx and ppx as above  - c/w DVT ppx with heparin  - outpatient GI follow up    #LINNETTE  - patient presented with Cr 1.7 from dc Cr 1.2, now downtrend to 0.9. Resolved  - was likely pre renal due to sepsis    #Anemia  - Likely due to chronic inflammation, mild KELLY    #Thrombocytopenia  - stable, no signs of bleeding    #DM - c/w ISS  #HTN - now hypotensive, hold all BP meds  #Tobacco abuse - nicotine replacement    Remainder of plan as above 52 y/o man with PMH significant for cirrhosis, DM who presented with septic shock and found to have E coli bacteremia with likely source SBP    #Ecoli bacteremia  - previous admit with UA + for ESBL Ecoli, was not treated at that time as patient asymptomatic  - Blood cultures are E coli but NOT ESBL  - ID consulted, appreciate recs  - stopped Ertapenem and started CTX 2gm  - CT A/P with increased ascites, no other sources of infection  - s/p paracentesis with likely SBP, did not technically meet criteria but patient was on abx for 5 days prior to tap  - Attempting to obtain collateral from [place] regarding prior paracenteses  - will complete CTX tomorrow, will dc on lifelong SBP ppx with Cipro.  Discussed with patient.  Cipro preferred to Bactrim as patient with frequent AKIs.  QTc 431 on 9/9    #septic shock  - patient presented with sepsis due to E coli infection requiring levophed now improving  - Abx as above  - Weaned off midodrine today  - hold all antihypertensives    #Cirrhosis  - due to EtOH abuse, no appreciable ascites on exam although limited due to habitus with excess skin due to patient's weight loss  - patient previously on Lasix and Spironolactone, now held due to hypotension  - c/w outpatient follow up with [facility].  Will likely not be able to restart diuretic therapy during this admission  - c/w lactulose  - SBP tx and ppx as above  - c/w DVT ppx with heparin  - outpatient GI follow up    #LINNETTE  - patient presented with Cr 1.7 from dc Cr 1.2, now downtrend to 0.9. Resolved  - was likely pre renal due to sepsis    #Anemia  - Likely due to chronic inflammation, mild KELLY    #Thrombocytopenia  - stable, no signs of bleeding    #DM - c/w ISS  #HTN - now hypotensive, hold all BP meds  #Tobacco abuse - nicotine replacement    Remainder of plan as above 50 y/o man with PMH significant for cirrhosis, DM who presented with septic shock and found to have E coli bacteremia with likely source SBP    #Ecoli bacteremia  - previous admit with UA + for ESBL Ecoli, was not treated at that time as patient asymptomatic  - Blood cultures are E coli but NOT ESBL  - ID consulted, appreciate recs  - stopped Ertapenem and started CTX 2gm  - CT A/P with increased ascites, no other sources of infection  - s/p paracentesis with likely SBP, did not technically meet criteria but patient was on abx for 5 days prior to tap  - Collateral obtained from [place] regarding prior paracenteses in 7/22 and 4/22 without SBP  - will complete CTX tomorrow, will dc on lifelong SBP ppx with Cipro.  Discussed with patient.  Cipro preferred to Bactrim as patient with frequent AKIs.  QTc 431 on 9/9    #septic shock  - patient presented with sepsis due to E coli infection requiring levophed now improving  - Abx as above  - Weaned off midodrine today  - hold all antihypertensives    #Cirrhosis  - due to EtOH abuse, no appreciable ascites on exam although limited due to habitus with excess skin due to patient's weight loss  - patient previously on Lasix and Spironolactone, now held due to hypotension  - c/w outpatient follow up with [facility].  Will likely not be able to restart diuretic therapy during this admission  - c/w lactulose  - SBP tx and ppx as above  - c/w DVT ppx with heparin  - outpatient GI follow up    #LINNETTE  - patient presented with Cr 1.7 from dc Cr 1.2, now downtrend to 0.9. Resolved  - was likely pre renal due to sepsis    #Anemia  - Likely due to chronic inflammation, mild KELLY    #Thrombocytopenia  - stable, no signs of bleeding    #DM - c/w ISS  #HTN - now hypotensive, hold all BP meds  #Tobacco abuse - nicotine replacement    Remainder of plan as above 52 y/o man with PMH significant for cirrhosis, DM who presented with septic shock and found to have E coli bacteremia with likely source SBP    #Ecoli bacteremia  - previous admit with UA + for ESBL Ecoli, was not treated at that time as patient asymptomatic  - Blood cultures are E coli but NOT ESBL  - ID consulted, appreciate recs  - stopped Ertapenem and started CTX 2gm  - CT A/P with increased ascites, no other sources of infection  - s/p paracentesis with likely SBP, did not technically meet criteria but patient was on abx for 5 days prior to tap  - Collateral obtained from [place] regarding prior paracenteses in 7/22 and 4/22 without SBP  - will complete CTX tomorrow, will dc on lifelong SBP ppx with Cipro.  Discussed with patient.  Cipro preferred to Bactrim as patient with frequent AKIs.  QTc 431 on 9/9    #septic shock  - patient presented with sepsis due to E coli infection requiring levophed now improving  - Abx as above  - Weaned off midodrine today  - hold all antihypertensives    #Cirrhosis  - due to EtOH abuse, no appreciable ascites on exam although limited due to habitus with excess skin due to patient's weight loss  - patient previously on Lasix and Spironolactone, now held due to hypotension  - c/w outpatient follow up with [facility].  Will likely not be able to restart diuretic therapy during this admission  - c/w lactulose  - SBP tx and ppx as above  - c/w DVT ppx with heparin  - outpatient GI follow up    #LINNETTE  - patient presented with Cr 1.7 from dc Cr 1.2, now downtrend to 0.9. Resolved  - was likely pre renal due to sepsis    #Anemia  - Likely due to chronic inflammation, mild KELLY    #Thrombocytopenia  - stable, no signs of bleeding    #DM - c/w ISS  #HTN - now hypotensive, hold all BP meds  #Tobacco abuse - nicotine replacement    Remainder of plan as above 52 y/o man with PMH significant for cirrhosis, DM who presented with septic shock and found to have E coli bacteremia with likely source SBP    #Ecoli bacteremia  - previous admit with UA + for ESBL Ecoli, was not treated at that time as patient asymptomatic  - Blood cultures are E coli but NOT ESBL  - ID consulted, appreciate recs  - stopped Ertapenem and started CTX 2gm  - CT A/P with increased ascites, no other sources of infection  - s/p paracentesis with likely SBP, did not technically meet criteria but patient was on abx for 5 days prior to tap  - Collateral obtained from [place] regarding prior paracenteses in 7/22 and 4/22 without SBP  - will complete CTX tomorrow, will dc on lifelong SBP ppx with Cipro.  Discussed with patient.  Cipro preferred to Bactrim as patient with frequent AKIs.  QTc 431 on 9/9    #septic shock  - patient presented with sepsis due to E coli infection requiring levophed now improving  - Abx as above  - Weaned off midodrine today  - hold all antihypertensives    #Cirrhosis  - due to EtOH abuse, no appreciable ascites on exam although limited due to habitus with excess skin due to patient's weight loss  - patient previously on Lasix and Spironolactone, now held due to hypotension  - c/w outpatient follow up with [facility].  Will likely not be able to restart diuretic therapy during this admission  - c/w lactulose  - SBP tx and ppx as above  - c/w DVT ppx with heparin  - outpatient GI follow up    #LINNETTE  - patient presented with Cr 1.7 from dc Cr 1.2, now downtrend to 0.9. Resolved  - was likely pre renal due to sepsis    #Anemia  - Likely due to chronic inflammation, mild KELLY    #Thrombocytopenia  - stable, no signs of bleeding    #DM - c/w ISS  #HTN - now hypotensive, hold all BP meds  #Tobacco abuse - nicotine replacement    Remainder of plan as above

## 2022-09-16 LAB
ANION GAP SERPL CALC-SCNC: 10 MMOL/L — SIGNIFICANT CHANGE UP (ref 5–17)
BUN SERPL-MCNC: 8 MG/DL — SIGNIFICANT CHANGE UP (ref 7–23)
CALCIUM SERPL-MCNC: 9.2 MG/DL — SIGNIFICANT CHANGE UP (ref 8.4–10.5)
CHLORIDE SERPL-SCNC: 97 MMOL/L — SIGNIFICANT CHANGE UP (ref 96–108)
CO2 SERPL-SCNC: 25 MMOL/L — SIGNIFICANT CHANGE UP (ref 22–31)
CREAT SERPL-MCNC: 0.66 MG/DL — SIGNIFICANT CHANGE UP (ref 0.5–1.3)
EGFR: 112 ML/MIN/1.73M2 — SIGNIFICANT CHANGE UP
GLUCOSE BLDC GLUCOMTR-MCNC: 117 MG/DL — HIGH (ref 70–99)
GLUCOSE BLDC GLUCOMTR-MCNC: 135 MG/DL — HIGH (ref 70–99)
GLUCOSE BLDC GLUCOMTR-MCNC: 151 MG/DL — HIGH (ref 70–99)
GLUCOSE BLDC GLUCOMTR-MCNC: 181 MG/DL — HIGH (ref 70–99)
GLUCOSE SERPL-MCNC: 124 MG/DL — HIGH (ref 70–99)
HCT VFR BLD CALC: 26.2 % — LOW (ref 39–50)
HGB BLD-MCNC: 8.4 G/DL — LOW (ref 13–17)
MAGNESIUM SERPL-MCNC: 1.7 MG/DL — SIGNIFICANT CHANGE UP (ref 1.6–2.6)
MCHC RBC-ENTMCNC: 27.3 PG — SIGNIFICANT CHANGE UP (ref 27–34)
MCHC RBC-ENTMCNC: 32.1 GM/DL — SIGNIFICANT CHANGE UP (ref 32–36)
MCV RBC AUTO: 85.1 FL — SIGNIFICANT CHANGE UP (ref 80–100)
NRBC # BLD: 0 /100 WBCS — SIGNIFICANT CHANGE UP (ref 0–0)
PHOSPHATE SERPL-MCNC: 2.7 MG/DL — SIGNIFICANT CHANGE UP (ref 2.5–4.5)
PLATELET # BLD AUTO: 149 K/UL — LOW (ref 150–400)
POTASSIUM SERPL-MCNC: 4.2 MMOL/L — SIGNIFICANT CHANGE UP (ref 3.5–5.3)
POTASSIUM SERPL-SCNC: 4.2 MMOL/L — SIGNIFICANT CHANGE UP (ref 3.5–5.3)
RBC # BLD: 3.08 M/UL — LOW (ref 4.2–5.8)
RBC # FLD: 15.4 % — HIGH (ref 10.3–14.5)
SODIUM SERPL-SCNC: 132 MMOL/L — LOW (ref 135–145)
WBC # BLD: 3.81 K/UL — SIGNIFICANT CHANGE UP (ref 3.8–10.5)
WBC # FLD AUTO: 3.81 K/UL — SIGNIFICANT CHANGE UP (ref 3.8–10.5)

## 2022-09-16 PROCEDURE — 99239 HOSP IP/OBS DSCHRG MGMT >30: CPT

## 2022-09-16 RX ORDER — MULTIVIT-MIN/FERROUS GLUCONATE 9 MG/15 ML
1 LIQUID (ML) ORAL
Qty: 30 | Refills: 0
Start: 2022-09-16 | End: 2022-10-15

## 2022-09-16 RX ORDER — SENNA PLUS 8.6 MG/1
1 TABLET ORAL ONCE
Refills: 0 | Status: COMPLETED | OUTPATIENT
Start: 2022-09-16 | End: 2022-09-16

## 2022-09-16 RX ORDER — MULTIVIT-MIN/FERROUS GLUCONATE 9 MG/15 ML
1 LIQUID (ML) ORAL
Qty: 0 | Refills: 0 | DISCHARGE
Start: 2022-09-16

## 2022-09-16 RX ORDER — CIPROFLOXACIN LACTATE 400MG/40ML
500 VIAL (ML) INTRAVENOUS ONCE
Refills: 0 | Status: COMPLETED | OUTPATIENT
Start: 2022-09-17 | End: 2022-09-17

## 2022-09-16 RX ORDER — GABAPENTIN 400 MG/1
1 CAPSULE ORAL
Qty: 90 | Refills: 0
Start: 2022-09-16 | End: 2022-10-15

## 2022-09-16 RX ORDER — DIPHENHYDRAMINE HYDROCHLORIDE AND LIDOCAINE HYDROCHLORIDE AND ALUMINUM HYDROXIDE AND MAGNESIUM HYDRO
10 KIT ONCE
Refills: 0 | Status: COMPLETED | OUTPATIENT
Start: 2022-09-16 | End: 2022-09-16

## 2022-09-16 RX ORDER — SIMETHICONE 80 MG/1
80 TABLET, CHEWABLE ORAL ONCE
Refills: 0 | Status: COMPLETED | OUTPATIENT
Start: 2022-09-16 | End: 2022-09-16

## 2022-09-16 RX ORDER — THIAMINE MONONITRATE (VIT B1) 100 MG
1 TABLET ORAL
Qty: 30 | Refills: 0
Start: 2022-09-16 | End: 2022-10-15

## 2022-09-16 RX ORDER — CIPROFLOXACIN LACTATE 400MG/40ML
1 VIAL (ML) INTRAVENOUS
Qty: 30 | Refills: 0
Start: 2022-09-16 | End: 2022-10-15

## 2022-09-16 RX ORDER — LACTULOSE 10 G/15ML
30 SOLUTION ORAL
Qty: 2700 | Refills: 0
Start: 2022-09-16 | End: 2022-10-15

## 2022-09-16 RX ORDER — FOLIC ACID 0.8 MG
1 TABLET ORAL
Qty: 30 | Refills: 0
Start: 2022-09-16 | End: 2022-10-15

## 2022-09-16 RX ORDER — FOLIC ACID 0.8 MG
1 TABLET ORAL
Qty: 0 | Refills: 0 | DISCHARGE
Start: 2022-09-16

## 2022-09-16 RX ORDER — THIAMINE MONONITRATE (VIT B1) 100 MG
1 TABLET ORAL
Qty: 0 | Refills: 0 | DISCHARGE
Start: 2022-09-16

## 2022-09-16 RX ADMIN — Medication 1 TABLET(S): at 13:07

## 2022-09-16 RX ADMIN — Medication 1 MILLIGRAM(S): at 13:07

## 2022-09-16 RX ADMIN — CEFTRIAXONE 100 MILLIGRAM(S): 500 INJECTION, POWDER, FOR SOLUTION INTRAMUSCULAR; INTRAVENOUS at 16:17

## 2022-09-16 RX ADMIN — OXYCODONE HYDROCHLORIDE 10 MILLIGRAM(S): 5 TABLET ORAL at 21:05

## 2022-09-16 RX ADMIN — Medication 100 MILLIGRAM(S): at 13:07

## 2022-09-16 RX ADMIN — ENOXAPARIN SODIUM 40 MILLIGRAM(S): 100 INJECTION SUBCUTANEOUS at 19:09

## 2022-09-16 RX ADMIN — Medication 1 PATCH: at 18:51

## 2022-09-16 RX ADMIN — DIPHENHYDRAMINE HYDROCHLORIDE AND LIDOCAINE HYDROCHLORIDE AND ALUMINUM HYDROXIDE AND MAGNESIUM HYDRO 10 MILLILITER(S): KIT at 06:32

## 2022-09-16 RX ADMIN — PANTOPRAZOLE SODIUM 40 MILLIGRAM(S): 20 TABLET, DELAYED RELEASE ORAL at 06:32

## 2022-09-16 RX ADMIN — Medication 1 PATCH: at 13:07

## 2022-09-16 RX ADMIN — LACTULOSE 20 GRAM(S): 10 SOLUTION ORAL at 16:24

## 2022-09-16 RX ADMIN — Medication 1 PATCH: at 06:58

## 2022-09-16 RX ADMIN — LACTULOSE 20 GRAM(S): 10 SOLUTION ORAL at 06:31

## 2022-09-16 RX ADMIN — LACTULOSE 20 GRAM(S): 10 SOLUTION ORAL at 18:47

## 2022-09-16 RX ADMIN — OXYCODONE HYDROCHLORIDE 10 MILLIGRAM(S): 5 TABLET ORAL at 22:05

## 2022-09-16 RX ADMIN — Medication 2: at 09:01

## 2022-09-16 RX ADMIN — SENNA PLUS 1 TABLET(S): 8.6 TABLET ORAL at 01:02

## 2022-09-16 RX ADMIN — OXYCODONE HYDROCHLORIDE 10 MILLIGRAM(S): 5 TABLET ORAL at 13:07

## 2022-09-16 RX ADMIN — Medication 3 MILLIGRAM(S): at 21:05

## 2022-09-16 RX ADMIN — OXYCODONE HYDROCHLORIDE 10 MILLIGRAM(S): 5 TABLET ORAL at 14:07

## 2022-09-16 RX ADMIN — OXYCODONE HYDROCHLORIDE 10 MILLIGRAM(S): 5 TABLET ORAL at 06:31

## 2022-09-16 RX ADMIN — OXYCODONE HYDROCHLORIDE 10 MILLIGRAM(S): 5 TABLET ORAL at 14:42

## 2022-09-16 RX ADMIN — Medication 2: at 17:47

## 2022-09-16 RX ADMIN — SIMETHICONE 80 MILLIGRAM(S): 80 TABLET, CHEWABLE ORAL at 19:08

## 2022-09-16 RX ADMIN — OXYCODONE HYDROCHLORIDE 10 MILLIGRAM(S): 5 TABLET ORAL at 07:31

## 2022-09-16 NOTE — PROGRESS NOTE ADULT - ATTENDING COMMENTS
50 y/o man with PMH significant for cirrhosis, DM who presented with septic shock and found to have E coli bacteremia with likely source SBP    Patient seen and examined 9/16, was planned for dc but patient refused.  Given DC notice    #Ecoli bacteremia  - previous admit with UA + for ESBL Ecoli, was not treated at that time as patient asymptomatic  - Blood cultures are E coli but NOT ESBL  - ID consulted, appreciate recs  - stopped Ertapenem and started CTX 2gm  - CT A/P with increased ascites, no other sources of infection  - s/p paracentesis with likely SBP, did not technically meet criteria but patient was on abx for 5 days prior to tap  - Collateral obtained from [place] regarding prior paracenteses in 7/22 and 4/22 without SBP  - s/p CTX 7 day course, will dc on lifelong SBP ppx with Cipro.  Discussed with patient.  Cipro preferred to Bactrim as patient with frequent AKIs.  QTc 431 on 9/9    #septic shock  - patient presented with sepsis due to E coli infection requiring levophed now improving  - Abx as above  - Weaned off midodrine  - hold all antihypertensives    #Cirrhosis  - due to EtOH abuse, no appreciable ascites on exam although limited due to habitus with excess skin due to patient's weight loss  - patient previously on Lasix and Spironolactone, now held due to hypotension  - c/w outpatient follow up with [facility].  Will likely not be able to restart diuretic therapy during this admission  - c/w lactulose  - SBP tx and ppx as above  - c/w DVT ppx with heparin  - outpatient GI follow up    #LINNETTE  - patient presented with Cr 1.7 from dc Cr 1.2, now downtrend to 0.9. Resolved  - was likely pre renal due to sepsis    #Anemia  - Likely due to chronic inflammation, mild KELLY    #Thrombocytopenia  - stable, no signs of bleeding    #DM - c/w ISS  #HTN - now hypotensive, hold all BP meds  #Tobacco abuse - nicotine replacement    Remainder of plan as above 52 y/o man with PMH significant for cirrhosis, DM who presented with septic shock and found to have E coli bacteremia with likely source SBP    Patient seen and examined 9/16, was planned for dc but patient refused.  Given DC notice    #Ecoli bacteremia  - previous admit with UA + for ESBL Ecoli, was not treated at that time as patient asymptomatic  - Blood cultures are E coli but NOT ESBL  - ID consulted, appreciate recs  - stopped Ertapenem and started CTX 2gm  - CT A/P with increased ascites, no other sources of infection  - s/p paracentesis with likely SBP, did not technically meet criteria but patient was on abx for 5 days prior to tap  - Collateral obtained from [place] regarding prior paracenteses in 7/22 and 4/22 without SBP  - s/p CTX 7 day course, will dc on lifelong SBP ppx with Cipro.  Discussed with patient.  Cipro preferred to Bactrim as patient with frequent AKIs.  QTc 431 on 9/9    #septic shock  - patient presented with sepsis due to E coli infection requiring levophed now improving  - Abx as above  - Weaned off midodrine  - hold all antihypertensives    #Cirrhosis  - due to EtOH abuse, no appreciable ascites on exam although limited due to habitus with excess skin due to patient's weight loss  - patient previously on Lasix and Spironolactone, now held due to hypotension  - c/w outpatient follow up with [facility].  Will likely not be able to restart diuretic therapy during this admission  - c/w lactulose  - SBP tx and ppx as above  - c/w DVT ppx with heparin  - outpatient GI follow up    #LINNETTE  - patient presented with Cr 1.7 from dc Cr 1.2, now downtrend to 0.9. Resolved  - was likely pre renal due to sepsis    #Anemia  - Likely due to chronic inflammation, mild KELLY    #Thrombocytopenia  - stable, no signs of bleeding    #DM - c/w ISS  #HTN - now hypotensive, hold all BP meds  #Tobacco abuse - nicotine replacement    Remainder of plan as above 52 y/o man with PMH significant for cirrhosis, DM who presented with septic shock and found to have E coli bacteremia with likely source SBP    Patient seen and examined 9/16, was planned for dc but patient refused.  Given DC notice    #Ecoli bacteremia  - previous admit with UA + for ESBL Ecoli, was not treated at that time as patient asymptomatic  - Blood cultures are E coli but NOT ESBL  - ID consulted, appreciate recs  - stopped Ertapenem and started CTX 2gm  - CT A/P with increased ascites, no other sources of infection  - s/p paracentesis with likely SBP, did not technically meet criteria but patient was on abx for 5 days prior to tap  - Collateral obtained from [place] regarding prior paracenteses in 7/22 and 4/22 without SBP  - s/p CTX 7 day course, will dc on lifelong SBP ppx with Cipro.  Discussed with patient.  Cipro preferred to Bactrim as patient with frequent AKIs.  QTc 431 on 9/9    #septic shock  - patient presented with sepsis due to E coli infection requiring levophed now improving  - Abx as above  - Weaned off midodrine  - hold all antihypertensives    #Cirrhosis  - due to EtOH abuse, no appreciable ascites on exam although limited due to habitus with excess skin due to patient's weight loss  - patient previously on Lasix and Spironolactone, now held due to hypotension  - c/w outpatient follow [facility] with [facility].  Will likely not be able to restart diuretic therapy during this admission  - c/w lactulose  - SBP tx and ppx as above  - c/w DVT ppx with heparin  - outpatient GI follow up    #LINNETTE  - patient presented with Cr 1.7 from dc Cr 1.2, now downtrend to 0.9. Resolved  - was likely pre renal due to sepsis    #Anemia  - Likely due to chronic inflammation, mild KELLY    #Thrombocytopenia  - stable, no signs of bleeding    #DM - c/w ISS  #HTN - now hypotensive, hold all BP meds  #Tobacco abuse - nicotine replacement    Remainder of plan as above

## 2022-09-16 NOTE — PROGRESS NOTE ADULT - ASSESSMENT
52 y/o man with PMH significant for cirrhosis, DM with previously untreated ESBL UTI, presenting with rigors, AMS, fever and hemodynamic instability with elevated WBC count likely due to multiorgan failure 2/2 SBP i/s/o alcoholic cirrhosis.  50 y/o man with PMH significant for cirrhosis, DM with previously untreated ESBL UTI, presenting with rigors, AMS, fever and hemodynamic instability with elevated WBC count likely due to multiorgan failure 2/2 SBP i/s/o alcoholic cirrhosis.

## 2022-09-16 NOTE — PROGRESS NOTE ADULT - PROBLEM SELECTOR PLAN 1
2/2 SBP s/p paracentesis. Repeat cultures from 09/10 are negative.     Plan:   - ceftriaxone 2 g IV q24 (last dose on 09/16)  - start ciprofloxacin for SBP prophylaxis

## 2022-09-16 NOTE — PROGRESS NOTE ADULT - SUBJECTIVE AND OBJECTIVE BOX
Subjective/ROS: Patient seen and examined at bedside, no acute events overnight. Dressed in his street clothing, looks ready to go. No complaints. Denies Fever/Chills, HA, CP, SOB, n/v, changes in bowel/urinary habits. 12pt ROS otherwise negative.    VITALS  Vital Signs Last 24 Hrs  T(C): 36.3 (16 Sep 2022 06:13), Max: 36.8 (15 Sep 2022 21:34)  T(F): 97.4 (16 Sep 2022 06:13), Max: 98.2 (15 Sep 2022 21:34)  HR: 78 (16 Sep 2022 08:40) (72 - 101)  BP: 102/63 (16 Sep 2022 06:13) (102/63 - 123/82)  BP(mean): --  RR: 18 (16 Sep 2022 06:13) (14 - 18)  SpO2: 98% (16 Sep 2022 08:40) (96% - 100%)    Parameters below as of 16 Sep 2022 06:13  Patient On (Oxygen Delivery Method): BiPAP/CPAP        CAPILLARY BLOOD GLUCOSE      POCT Blood Glucose.: 181 mg/dL (16 Sep 2022 17:03)  POCT Blood Glucose.: 117 mg/dL (16 Sep 2022 12:57)  POCT Blood Glucose.: 151 mg/dL (16 Sep 2022 08:44)  POCT Blood Glucose.: 139 mg/dL (15 Sep 2022 21:32)      PHYSICAL EXAM  General: NAD, conversive  Head: NC/AT; MMM; PERRL; EOMI; missing teeth   Neck: Supple; no JVD  Respiratory: CTAB  Cardiovascular: Regular rhythm/rate; S1/S2+, no murmurs, rubs gallops   Gastrointestinal: Soft; non specific abdominal tenderness   Extremities: WWP; no edema/cyanosis  Neurological: A&Ox3, CNII-XII grossly intact; no obvious focal deficits    MEDICATIONS  (STANDING):  cefTRIAXone   IVPB 2000 milliGRAM(s) IV Intermittent every 24 hours  dextrose 5%. 1000 milliLiter(s) (50 mL/Hr) IV Continuous <Continuous>  dextrose 5%. 1000 milliLiter(s) (100 mL/Hr) IV Continuous <Continuous>  dextrose 50% Injectable 25 Gram(s) IV Push once  dextrose 50% Injectable 12.5 Gram(s) IV Push once  dextrose 50% Injectable 25 Gram(s) IV Push once  enoxaparin Injectable 40 milliGRAM(s) SubCutaneous every 12 hours  folic acid 1 milliGRAM(s) Oral daily  glucagon  Injectable 1 milliGRAM(s) IntraMuscular once  insulin lispro (ADMELOG) corrective regimen sliding scale   SubCutaneous Before meals and at bedtime  lactulose Syrup 20 Gram(s) Oral every 12 hours  multivitamin/minerals 1 Tablet(s) Oral daily  nicotine -  14 mG/24Hr(s) Patch 1 Patch Transdermal daily  oxyCODONE    IR 10 milliGRAM(s) Oral three times a day  pantoprazole    Tablet 40 milliGRAM(s) Oral before breakfast  simethicone 80 milliGRAM(s) Chew once  thiamine 100 milliGRAM(s) Oral daily    MEDICATIONS  (PRN):  dextrose Oral Gel 15 Gram(s) Oral once PRN Blood Glucose LESS THAN 70 milliGRAM(s)/deciliter  hemorrhoidal Ointment 1 Application(s) Rectal three times a day PRN hemorrhoids  melatonin 3 milliGRAM(s) Oral at bedtime PRN Sleep      No Known Allergies      LABS                        8.4    3.81  )-----------( 149      ( 16 Sep 2022 07:37 )             26.2     09-16    132<L>  |  97  |  8   ----------------------------<  124<H>  4.2   |  25  |  0.66    Ca    9.2      16 Sep 2022 07:37  Phos  2.7     09-16  Mg     1.7     09-16                  IMAGING/EKG/ETC

## 2022-09-17 LAB
GLUCOSE BLDC GLUCOMTR-MCNC: 136 MG/DL — HIGH (ref 70–99)
GLUCOSE BLDC GLUCOMTR-MCNC: 153 MG/DL — HIGH (ref 70–99)
GLUCOSE BLDC GLUCOMTR-MCNC: 162 MG/DL — HIGH (ref 70–99)
GLUCOSE BLDC GLUCOMTR-MCNC: 168 MG/DL — HIGH (ref 70–99)

## 2022-09-17 PROCEDURE — 99232 SBSQ HOSP IP/OBS MODERATE 35: CPT | Mod: GC

## 2022-09-17 RX ORDER — POLYETHYLENE GLYCOL 3350 17 G/17G
17 POWDER, FOR SOLUTION ORAL ONCE
Refills: 0 | Status: COMPLETED | OUTPATIENT
Start: 2022-09-17 | End: 2022-09-17

## 2022-09-17 RX ORDER — SENNA PLUS 8.6 MG/1
1 TABLET ORAL ONCE
Refills: 0 | Status: COMPLETED | OUTPATIENT
Start: 2022-09-17 | End: 2022-09-17

## 2022-09-17 RX ORDER — CIPROFLOXACIN LACTATE 400MG/40ML
500 VIAL (ML) INTRAVENOUS DAILY
Refills: 0 | Status: DISCONTINUED | OUTPATIENT
Start: 2022-09-18 | End: 2022-09-20

## 2022-09-17 RX ADMIN — OXYCODONE HYDROCHLORIDE 10 MILLIGRAM(S): 5 TABLET ORAL at 23:02

## 2022-09-17 RX ADMIN — Medication 1 PATCH: at 14:11

## 2022-09-17 RX ADMIN — Medication 2: at 08:55

## 2022-09-17 RX ADMIN — Medication 1 PATCH: at 07:12

## 2022-09-17 RX ADMIN — OXYCODONE HYDROCHLORIDE 10 MILLIGRAM(S): 5 TABLET ORAL at 22:04

## 2022-09-17 RX ADMIN — OXYCODONE HYDROCHLORIDE 10 MILLIGRAM(S): 5 TABLET ORAL at 15:53

## 2022-09-17 RX ADMIN — Medication 2: at 22:47

## 2022-09-17 RX ADMIN — SENNA PLUS 1 TABLET(S): 8.6 TABLET ORAL at 23:09

## 2022-09-17 RX ADMIN — Medication 2: at 12:41

## 2022-09-17 RX ADMIN — Medication 1 TABLET(S): at 11:24

## 2022-09-17 RX ADMIN — OXYCODONE HYDROCHLORIDE 10 MILLIGRAM(S): 5 TABLET ORAL at 16:54

## 2022-09-17 RX ADMIN — OXYCODONE HYDROCHLORIDE 10 MILLIGRAM(S): 5 TABLET ORAL at 06:54

## 2022-09-17 RX ADMIN — ENOXAPARIN SODIUM 40 MILLIGRAM(S): 100 INJECTION SUBCUTANEOUS at 19:08

## 2022-09-17 RX ADMIN — ENOXAPARIN SODIUM 40 MILLIGRAM(S): 100 INJECTION SUBCUTANEOUS at 07:06

## 2022-09-17 RX ADMIN — OXYCODONE HYDROCHLORIDE 10 MILLIGRAM(S): 5 TABLET ORAL at 07:52

## 2022-09-17 RX ADMIN — LACTULOSE 20 GRAM(S): 10 SOLUTION ORAL at 06:53

## 2022-09-17 RX ADMIN — Medication 500 MILLIGRAM(S): at 07:07

## 2022-09-17 RX ADMIN — Medication 1 PATCH: at 18:01

## 2022-09-17 RX ADMIN — CEFTRIAXONE 100 MILLIGRAM(S): 500 INJECTION, POWDER, FOR SOLUTION INTRAMUSCULAR; INTRAVENOUS at 15:54

## 2022-09-17 RX ADMIN — Medication 3 MILLIGRAM(S): at 22:04

## 2022-09-17 RX ADMIN — Medication 100 MILLIGRAM(S): at 11:25

## 2022-09-17 RX ADMIN — Medication 1 MILLIGRAM(S): at 11:25

## 2022-09-17 RX ADMIN — Medication 1 PATCH: at 11:25

## 2022-09-17 RX ADMIN — LACTULOSE 20 GRAM(S): 10 SOLUTION ORAL at 17:53

## 2022-09-17 RX ADMIN — PANTOPRAZOLE SODIUM 40 MILLIGRAM(S): 20 TABLET, DELAYED RELEASE ORAL at 06:54

## 2022-09-17 RX ADMIN — POLYETHYLENE GLYCOL 3350 17 GRAM(S): 17 POWDER, FOR SOLUTION ORAL at 23:09

## 2022-09-17 NOTE — PROGRESS NOTE ADULT - SUBJECTIVE AND OBJECTIVE BOX
O/N Events: naeo    Subjective/ROS: Patient seen and examined at bedside, no acute complaints. Medically ready to leave, all vitals wnl. No complaints. Denies Fever/Chills, HA, CP, SOB, n/v, changes in bowel/urinary habits.  12pt ROS otherwise negative.    VITALS  Vital Signs Last 24 Hrs  T(C): 36.9 (17 Sep 2022 20:35), Max: 37.2 (17 Sep 2022 06:04)  T(F): 98.5 (17 Sep 2022 20:35), Max: 99 (17 Sep 2022 06:04)  HR: 88 (17 Sep 2022 20:35) (77 - 96)  BP: 112/71 (17 Sep 2022 20:35) (99/53 - 120/74)  BP(mean): --  RR: 17 (17 Sep 2022 20:35) (17 - 19)  SpO2: 100% (17 Sep 2022 20:35) (98% - 100%)    Parameters below as of 17 Sep 2022 20:35  Patient On (Oxygen Delivery Method): room air        CAPILLARY BLOOD GLUCOSE      POCT Blood Glucose.: 136 mg/dL (17 Sep 2022 17:39)  POCT Blood Glucose.: 162 mg/dL (17 Sep 2022 12:11)  POCT Blood Glucose.: 168 mg/dL (17 Sep 2022 08:50)      PHYSICAL EXAM  General: NAD  Head: NC/AT; MMM; PERRL; EOMI;  Neck: Supple; no JVD  Respiratory: CTAB; no wheezes/rales/rhonchi  Cardiovascular: Regular rhythm/rate; S1/S2+, no murmurs, rubs gallops   Gastrointestinal: Soft; NTND; bowel sounds normal and present. Loose skin from weight loss   Extremities: WWP; no edema/cyanosis  Neurological: A&Ox3, CNII-XII grossly intact; no obvious focal deficits    MEDICATIONS  (STANDING):  dextrose 5%. 1000 milliLiter(s) (50 mL/Hr) IV Continuous <Continuous>  dextrose 5%. 1000 milliLiter(s) (100 mL/Hr) IV Continuous <Continuous>  dextrose 50% Injectable 25 Gram(s) IV Push once  dextrose 50% Injectable 12.5 Gram(s) IV Push once  dextrose 50% Injectable 25 Gram(s) IV Push once  enoxaparin Injectable 40 milliGRAM(s) SubCutaneous every 12 hours  folic acid 1 milliGRAM(s) Oral daily  glucagon  Injectable 1 milliGRAM(s) IntraMuscular once  insulin lispro (ADMELOG) corrective regimen sliding scale   SubCutaneous Before meals and at bedtime  lactulose Syrup 20 Gram(s) Oral every 12 hours  multivitamin/minerals 1 Tablet(s) Oral daily  nicotine -  14 mG/24Hr(s) Patch 1 Patch Transdermal daily  pantoprazole    Tablet 40 milliGRAM(s) Oral before breakfast  thiamine 100 milliGRAM(s) Oral daily    MEDICATIONS  (PRN):  dextrose Oral Gel 15 Gram(s) Oral once PRN Blood Glucose LESS THAN 70 milliGRAM(s)/deciliter  hemorrhoidal Ointment 1 Application(s) Rectal three times a day PRN hemorrhoids  melatonin 3 milliGRAM(s) Oral at bedtime PRN Sleep      No Known Allergies      LABS                        8.4    3.81  )-----------( 149      ( 16 Sep 2022 07:37 )             26.2     09-16    132<L>  |  97  |  8   ----------------------------<  124<H>  4.2   |  25  |  0.66    Ca    9.2      16 Sep 2022 07:37  Phos  2.7     09-16  Mg     1.7     09-16                  IMAGING/EKG/ETC

## 2022-09-17 NOTE — PROGRESS NOTE ADULT - ATTENDING COMMENTS
52 y/o man with PMH significant for cirrhosis, DM who presented with septic shock and found to have E coli bacteremia with likely source SBP      #Ecoli bacteremia  - previous admit with UA + for ESBL Ecoli, was not treated at that time as patient asymptomatic  - Blood cultures are E coli but NOT ESBL  - ID consulted, appreciate recs  - stopped Ertapenem and started CTX 2gm  - CT A/P with increased ascites, no other sources of infection  - s/p paracentesis with likely SBP, did not technically meet criteria but patient was on abx for 5 days prior to tap  - Collateral obtained from [place] regarding prior paracenteses in 7/22 and 4/22 without SBP  - s/p CTX 7 day course, will dc on lifelong SBP ppx with Cipro.  Discussed with patient.  Cipro preferred to Bactrim as patient with frequent AKIs.  QTc 431 on 9/9    #septic shock  - patient presented with sepsis due to E coli infection requiring levophed now improving  - Abx as above  - Weaned off midodrine  - hold all antihypertensives    #Cirrhosis  - due to EtOH abuse, no appreciable ascites on exam although limited due to habitus with excess skin due to patient's weight loss  - patient previously on Lasix and Spironolactone, now held due to hypotension  - c/w outpatient follow up with [facility].  Will likely not be able to restart diuretic therapy during this admission  - c/w lactulose  - SBP tx and ppx as above  - c/w DVT ppx with heparin  - outpatient GI follow up    #LINNETTE  - patient presented with Cr 1.7 from dc Cr 1.2, now downtrend to 0.9. Resolved  - was likely pre renal due to sepsis    #Anemia  - Likely due to chronic inflammation, mild KELLY    #Thrombocytopenia  - stable, no signs of bleeding    #DM - c/w ISS  #HTN - now hypotensive, hold all BP meds  #Tobacco abuse - nicotine replacement    Dispo: given DC notice 9/16, pending appeal results.  Remains medically stable for dc 52 y/o man with PMH significant for cirrhosis, DM who presented with septic shock and found to have E coli bacteremia with likely source SBP      #Ecoli bacteremia  - previous admit with UA + for ESBL Ecoli, was not treated at that time as patient asymptomatic  - Blood cultures are E coli but NOT ESBL  - ID consulted, appreciate recs  - stopped Ertapenem and started CTX 2gm  - CT A/P with increased ascites, no other sources of infection  - s/p paracentesis with likely SBP, did not technically meet criteria but patient was on abx for 5 days prior to tap  - Collateral obtained from [place] regarding prior paracenteses in 7/22 and 4/22 without SBP  - s/p CTX 7 day course, will dc on lifelong SBP ppx with Cipro.  Discussed with patient.  Cipro preferred to Bactrim as patient with frequent AKIs.  QTc 431 on 9/9    #septic shock  - patient presented with sepsis due to E coli infection requiring levophed now improving  - Abx as above  - Weaned off midodrine  - hold all antihypertensives    #Cirrhosis  - due to EtOH abuse, no appreciable ascites on exam although limited due to habitus with excess skin due to patient's weight loss  - patient previously on Lasix and Spironolactone, now held due to hypotension  - c/w outpatient follow up with [facility].  Will likely not be able to restart diuretic therapy during this admission  - c/w lactulose  - SBP tx and ppx as above  - c/w DVT ppx with heparin  - outpatient GI follow up    #LINNETTE  - patient presented with Cr 1.7 from dc Cr 1.2, now downtrend to 0.9. Resolved  - was likely pre renal due to sepsis    #Anemia  - Likely due to chronic inflammation, mild KELLY    #Thrombocytopenia  - stable, no signs of bleeding    #DM - c/w ISS  #HTN - now hypotensive, hold all BP meds  #Tobacco abuse - nicotine replacement    Dispo: given DC notice 9/16, pending appeal results.  Remains medically stable for dc 52 y/o man with PMH significant for cirrhosis, DM who presented with septic shock and found to have E coli bacteremia with likely source SBP      #Ecoli bacteremia  - previous admit with UA + for ESBL Ecoli, was not treated at that time as patient asymptomatic  - Blood cultures are E coli but NOT ESBL  - ID consulted, appreciate recs  - stopped Ertapenem and started CTX 2gm  - CT A/P with increased ascites, no other sources of infection  - s/p paracentesis with likely SBP, did not technically meet criteria but patient was on abx for 5 days prior to tap  - Collateral obtained from [place] regarding prior paracenteses in 7/22 and 4/22 without SBP  - s/p CTX 7 day course, will dc on lifelong SBP ppx with Cipro.  Discussed with patient.  Cipro preferred to Bactrim as patient with frequent AKIs.  QTc 431 on 9/9    #septic shock  - patient presented with sepsis due to E coli infection requiring levophed now improving  - Abx as above  - Weaned off midodrine  - hold all antihypertensives    #Cirrhosis  - due to EtOH abuse, no appreciable ascites on exam although limited due to habitus with excess skin due to patient's weight loss  - patient previously on Lasix and Spironolactone, now held due to hypotension  - c/w outpatient follow up with [facility].  Will likely not be able to restart diuretic therapy during this admission  - c/w lactulose  - SBP tx and ppx as above  - c/w DVT ppx with heparin  - outpatient GI follow up    #LINNETTE  - patient presented with Cr 1.7 from dc Cr 1.2, now downtrend to 0.9. Resolved  - was likely pre renal due to sepsis    #Anemia  - Likely due to chronic inflammation, mild KELLY    #Thrombocytopenia  - stable, no signs of bleeding    #DM - c/w ISS  #HTN - now hypotensive, hold all BP meds  #Tobacco abuse - nicotine replacement    Dispo: given DC notice 9/16, pending appeal results.  Remains medically stable for dc

## 2022-09-17 NOTE — PROGRESS NOTE ADULT - ASSESSMENT
50 y/o man with PMH significant for cirrhosis, DM with previously untreated ESBL UTI, presenting with rigors, AMS, fever and hemodynamic instability with elevated WBC count likely due to multiorgan failure 2/2 SBP i/s/o alcoholic cirrhosis now medically stable without acute processes.  52 y/o man with PMH significant for cirrhosis, DM with previously untreated ESBL UTI, presenting with rigors, AMS, fever and hemodynamic instability with elevated WBC count likely due to multiorgan failure 2/2 SBP i/s/o alcoholic cirrhosis now medically stable without acute processes.

## 2022-09-17 NOTE — PROGRESS NOTE ADULT - PROBLEM SELECTOR PLAN 1
MEAL PLANNING  Recommended Daily Meal Plan (Servings)    Exercise Plan:  [x] None for now    Food Preferences:  Dislikes: raw tomatoes         Breakfast   AM Snack   Lunch   PM Snack     Dinner   HS Snack       Meat, Cheese, Protein     3  3    Grain   1    2  2    Vegetable             Fruit   1    1      Milk   1    1    Fat     1  1    Dessert           Other    2 foods  2 foods  2 foods        2/2 SBP s/p paracentesis. Repeat cultures from 09/10 are negative.     Plan:   - ceftriaxone 2 g IV q24 (last dose on 09/16)  - start ciprofloxacin for SBP prophylaxis

## 2022-09-18 LAB
GLUCOSE BLDC GLUCOMTR-MCNC: 152 MG/DL — HIGH (ref 70–99)
GLUCOSE BLDC GLUCOMTR-MCNC: 156 MG/DL — HIGH (ref 70–99)
GLUCOSE BLDC GLUCOMTR-MCNC: 187 MG/DL — HIGH (ref 70–99)
GLUCOSE BLDC GLUCOMTR-MCNC: 242 MG/DL — HIGH (ref 70–99)

## 2022-09-18 PROCEDURE — 99232 SBSQ HOSP IP/OBS MODERATE 35: CPT | Mod: GC

## 2022-09-18 RX ORDER — ACETAMINOPHEN 500 MG
975 TABLET ORAL EVERY 8 HOURS
Refills: 0 | Status: DISCONTINUED | OUTPATIENT
Start: 2022-09-18 | End: 2022-09-20

## 2022-09-18 RX ORDER — OXYCODONE HYDROCHLORIDE 5 MG/1
10 TABLET ORAL THREE TIMES A DAY
Refills: 0 | Status: DISCONTINUED | OUTPATIENT
Start: 2022-09-18 | End: 2022-09-20

## 2022-09-18 RX ADMIN — Medication 500 MILLIGRAM(S): at 11:26

## 2022-09-18 RX ADMIN — Medication 1 PATCH: at 11:40

## 2022-09-18 RX ADMIN — PANTOPRAZOLE SODIUM 40 MILLIGRAM(S): 20 TABLET, DELAYED RELEASE ORAL at 06:36

## 2022-09-18 RX ADMIN — Medication 2: at 08:55

## 2022-09-18 RX ADMIN — LACTULOSE 20 GRAM(S): 10 SOLUTION ORAL at 17:21

## 2022-09-18 RX ADMIN — Medication 975 MILLIGRAM(S): at 22:24

## 2022-09-18 RX ADMIN — Medication 1 PATCH: at 18:35

## 2022-09-18 RX ADMIN — Medication 2: at 12:45

## 2022-09-18 RX ADMIN — OXYCODONE HYDROCHLORIDE 10 MILLIGRAM(S): 5 TABLET ORAL at 23:07

## 2022-09-18 RX ADMIN — Medication 4: at 23:06

## 2022-09-18 RX ADMIN — OXYCODONE HYDROCHLORIDE 10 MILLIGRAM(S): 5 TABLET ORAL at 17:48

## 2022-09-18 RX ADMIN — Medication 1 PATCH: at 11:26

## 2022-09-18 RX ADMIN — Medication 3 MILLIGRAM(S): at 22:25

## 2022-09-18 RX ADMIN — Medication 100 MILLIGRAM(S): at 11:26

## 2022-09-18 RX ADMIN — ENOXAPARIN SODIUM 40 MILLIGRAM(S): 100 INJECTION SUBCUTANEOUS at 19:03

## 2022-09-18 RX ADMIN — Medication 2: at 17:48

## 2022-09-18 RX ADMIN — ENOXAPARIN SODIUM 40 MILLIGRAM(S): 100 INJECTION SUBCUTANEOUS at 07:32

## 2022-09-18 RX ADMIN — OXYCODONE HYDROCHLORIDE 10 MILLIGRAM(S): 5 TABLET ORAL at 18:34

## 2022-09-18 RX ADMIN — Medication 1 MILLIGRAM(S): at 11:26

## 2022-09-18 RX ADMIN — LACTULOSE 20 GRAM(S): 10 SOLUTION ORAL at 06:41

## 2022-09-18 RX ADMIN — Medication 1 TABLET(S): at 11:26

## 2022-09-18 RX ADMIN — Medication 1 PATCH: at 06:36

## 2022-09-18 RX ADMIN — Medication 975 MILLIGRAM(S): at 23:30

## 2022-09-18 NOTE — PROGRESS NOTE ADULT - NUTRITIONAL ASSESSMENT
This patient has been assessed with a concern for Malnutrition and has been determined to have a diagnosis/diagnoses of Severe protein-calorie malnutrition.    This patient is being managed with:   Diet Regular-  Entered: Sep 10 2022  2:10AM    

## 2022-09-18 NOTE — PROGRESS NOTE ADULT - SUBJECTIVE AND OBJECTIVE BOX
OVERNIGHT EVENTS:  No acute events    SUBJECTIVE:  Patient seen and examined at bedside.  Complaint of b/l Knee pain.  Abdominal pain remains improved.  Denies fever, chills, N/V, diarrhea, constipation    Vital Signs Last 12 Hrs  T(F): 97.9 (09-18-22 @ 12:47), Max: 98.1 (09-18-22 @ 05:33)  HR: 91 (09-18-22 @ 12:47) (81 - 93)  BP: 121/76 (09-18-22 @ 12:47) (106/70 - 121/76)  BP(mean): --  RR: 18 (09-18-22 @ 12:47) (18 - 18)  SpO2: 100% (09-18-22 @ 12:47) (98% - 100%)  I&O's Summary      PHYSICAL EXAM:  Constitutional: NAD, comfortable in bedside chair.  HEENT: NC/AT, PERRLA, EOMI, no conjunctival pallor or scleral icterus, MMM  Neck: Supple, no JVD  Respiratory: CTA B/L. No w/r/r.   Cardiovascular: RRR, normal S1 and S2, no m/r/g.   Gastrointestinal: +BS, soft NTND, no guarding or rebound tenderness, no palpable masses, loose abdominal skin  Extremities: wwp; no cyanosis, clubbing or edema.   Vascular: Pulses equal and strong throughout.   Neurological: AAOx3, no CN deficits, strength and sensation intact throughout.   Skin: No gross skin abnormalities or rashes        LABS:  No new labs                RADIOLOGY & ADDITIONAL TESTS:    MEDICATIONS  (STANDING):  ciprofloxacin     Tablet 500 milliGRAM(s) Oral daily  dextrose 5%. 1000 milliLiter(s) (100 mL/Hr) IV Continuous <Continuous>  dextrose 5%. 1000 milliLiter(s) (50 mL/Hr) IV Continuous <Continuous>  dextrose 50% Injectable 25 Gram(s) IV Push once  dextrose 50% Injectable 12.5 Gram(s) IV Push once  dextrose 50% Injectable 25 Gram(s) IV Push once  enoxaparin Injectable 40 milliGRAM(s) SubCutaneous every 12 hours  folic acid 1 milliGRAM(s) Oral daily  glucagon  Injectable 1 milliGRAM(s) IntraMuscular once  insulin lispro (ADMELOG) corrective regimen sliding scale   SubCutaneous Before meals and at bedtime  lactulose Syrup 20 Gram(s) Oral every 12 hours  multivitamin/minerals 1 Tablet(s) Oral daily  nicotine -  14 mG/24Hr(s) Patch 1 Patch Transdermal daily  pantoprazole    Tablet 40 milliGRAM(s) Oral before breakfast  thiamine 100 milliGRAM(s) Oral daily    MEDICATIONS  (PRN):  dextrose Oral Gel 15 Gram(s) Oral once PRN Blood Glucose LESS THAN 70 milliGRAM(s)/deciliter  hemorrhoidal Ointment 1 Application(s) Rectal three times a day PRN hemorrhoids  melatonin 3 milliGRAM(s) Oral at bedtime PRN Sleep

## 2022-09-18 NOTE — PROGRESS NOTE ADULT - ASSESSMENT
50 y/o man with PMH significant for cirrhosis, DM who presented with septic shock and found to have E coli bacteremia with likely source SBP    #Ecoli bacteremia  - previous admit with UA + for ESBL Ecoli, was not treated at that time as patient asymptomatic  - Blood cultures are E coli but NOT ESBL  - ID consulted, appreciate recs  - stopped Ertapenem and started CTX 2gm  - CT A/P with increased ascites, no other sources of infection  - s/p paracentesis with likely SBP, did not technically meet criteria but patient was on abx for 5 days prior to tap  - Collateral obtained from [place] regarding prior paracenteses in 7/22 and 4/22 without SBP  - s/p CTX 7 day course, will dc on lifelong SBP ppx with Cipro.  Discussed with patient.  Cipro preferred to Bactrim as patient with frequent AKIs.  QTc 431 on 9/9    #septic shock  - patient presented with sepsis due to E coli infection requiring levophed now improving  - Abx as above  - Weaned off midodrine  - hold all antihypertensives    #Cirrhosis  - due to EtOH abuse, no appreciable ascites on exam although limited due to habitus with excess skin due to patient's weight loss  - patient previously on Lasix and Spironolactone, now held due to hypotension  - c/w outpatient follow up with [facility].  Will likely not be able to restart diuretic therapy during this admission  - c/w lactulose  - SBP tx and ppx as above  - c/w DVT ppx with heparin  - outpatient GI follow up    #LINNETTE  - Resolved  - was likely pre renal due to sepsis    #Anemia  - Likely due to chronic inflammation, mild KELLY    #Thrombocytopenia  - stable, no signs of bleeding    #DM - c/w ISS  #HTN - now hypotensive, hold all BP meds  #Tobacco abuse - nicotine replacement  #Severe protein calorie malnutrition    Dispo: pending DC notice appeal, discuss shelter with SW tomorrow 52 y/o man with PMH significant for cirrhosis, DM who presented with septic shock and found to have E coli bacteremia with likely source SBP    #Ecoli bacteremia  - previous admit with UA + for ESBL Ecoli, was not treated at that time as patient asymptomatic  - Blood cultures are E coli but NOT ESBL  - ID consulted, appreciate recs  - stopped Ertapenem and started CTX 2gm  - CT A/P with increased ascites, no other sources of infection  - s/p paracentesis with likely SBP, did not technically meet criteria but patient was on abx for 5 days prior to tap  - Collateral obtained from [place] regarding prior paracenteses in 7/22 and 4/22 without SBP  - s/p CTX 7 day course, will dc on lifelong SBP ppx with Cipro.  Discussed with patient.  Cipro preferred to Bactrim as patient with frequent AKIs.  QTc 431 on 9/9    #septic shock  - patient presented with sepsis due to E coli infection requiring levophed now improving  - Abx as above  - Weaned off midodrine  - hold all antihypertensives    #Cirrhosis  - due to EtOH abuse, no appreciable ascites on exam although limited due to habitus with excess skin due to patient's weight loss  - patient previously on Lasix and Spironolactone, now held due to hypotension  - c/w outpatient follow up with [facility].  Will likely not be able to restart diuretic therapy during this admission  - c/w lactulose  - SBP tx and ppx as above  - c/w DVT ppx with heparin  - outpatient GI follow up    #LINNETTE  - Resolved  - was likely pre renal due to sepsis    #Anemia  - Likely due to chronic inflammation, mild KELLY    #Thrombocytopenia  - stable, no signs of bleeding    #DM - c/w ISS  #HTN - now hypotensive, hold all BP meds  #Tobacco abuse - nicotine replacement  #Severe protein calorie malnutrition    Dispo: pending DC notice appeal, discuss shelter with SW tomorrow 52 y/o man with PMH significant for cirrhosis, DM who presented with septic shock and found to have E coli bacteremia with likely source SBP    #Ecoli bacteremia  - previous admit with UA + for ESBL Ecoli, was not treated at that time as patient asymptomatic  - Blood cultures are E coli but NOT ESBL  - ID consulted, appreciate recs  - stopped Ertapenem and started CTX 2gm  - CT A/P with increased ascites, no other sources of infection  - s/p paracentesis with likely SBP, did not technically meet criteria but patient was on abx for 5 days prior to tap  - Collateral obtained from [place] regarding prior paracenteses in 7/22 and 4/22 without SBP  - s/p CTX 7 day course, will dc on lifelong SBP ppx with Cipro.  Discussed with patient.  Cipro preferred to Bactrim as patient with frequent AKIs.  QTc 431 on 9/9    #septic shock  - patient presented with sepsis due to E coli infection requiring levophed now improving  - Abx as above  - Weaned off midodrine  - hold all antihypertensives    #Cirrhosis  - due to EtOH abuse, no appreciable ascites on exam although limited due to habitus with excess skin due to patient's weight loss  - patient previously on Lasix and Spironolactone, now held due to hypotension  - c/w outpatient follow up with [facility].  Will likely not be able to restart diuretic therapy during this admission  - c/w lactulose  - SBP tx and ppx as above  - c/w DVT ppx with heparin  - outpatient GI follow up    #LINNETTE  - Resolved  - was likely pre renal due to sepsis    #Anemia  - Likely due to chronic inflammation, mild KELLY    #Thrombocytopenia  - stable, no signs of bleeding    #DM - c/w ISS  #HTN - now hypotensive, hold all BP meds  #Tobacco abuse - nicotine replacement  #Severe protein calorie malnutrition    Dispo: pending DC notice appeal, discuss shelter with SW tomorrow

## 2022-09-19 ENCOUNTER — TRANSCRIPTION ENCOUNTER (OUTPATIENT)
Age: 52
End: 2022-09-19

## 2022-09-19 LAB
CULTURE RESULTS: NO GROWTH — SIGNIFICANT CHANGE UP
GLUCOSE BLDC GLUCOMTR-MCNC: 122 MG/DL — HIGH (ref 70–99)
GLUCOSE BLDC GLUCOMTR-MCNC: 162 MG/DL — HIGH (ref 70–99)
GLUCOSE BLDC GLUCOMTR-MCNC: 166 MG/DL — HIGH (ref 70–99)
GLUCOSE BLDC GLUCOMTR-MCNC: 93 MG/DL — SIGNIFICANT CHANGE UP (ref 70–99)
SPECIMEN SOURCE: SIGNIFICANT CHANGE UP

## 2022-09-19 PROCEDURE — 99232 SBSQ HOSP IP/OBS MODERATE 35: CPT | Mod: GC

## 2022-09-19 RX ADMIN — LACTULOSE 20 GRAM(S): 10 SOLUTION ORAL at 07:00

## 2022-09-19 RX ADMIN — Medication 1 PATCH: at 11:41

## 2022-09-19 RX ADMIN — ENOXAPARIN SODIUM 40 MILLIGRAM(S): 100 INJECTION SUBCUTANEOUS at 19:06

## 2022-09-19 RX ADMIN — PANTOPRAZOLE SODIUM 40 MILLIGRAM(S): 20 TABLET, DELAYED RELEASE ORAL at 07:00

## 2022-09-19 RX ADMIN — Medication 100 MILLIGRAM(S): at 11:40

## 2022-09-19 RX ADMIN — Medication 2: at 12:20

## 2022-09-19 RX ADMIN — ENOXAPARIN SODIUM 40 MILLIGRAM(S): 100 INJECTION SUBCUTANEOUS at 07:01

## 2022-09-19 RX ADMIN — Medication 500 MILLIGRAM(S): at 11:41

## 2022-09-19 RX ADMIN — OXYCODONE HYDROCHLORIDE 10 MILLIGRAM(S): 5 TABLET ORAL at 22:47

## 2022-09-19 RX ADMIN — Medication 30 MILLILITER(S): at 22:47

## 2022-09-19 RX ADMIN — OXYCODONE HYDROCHLORIDE 10 MILLIGRAM(S): 5 TABLET ORAL at 16:05

## 2022-09-19 RX ADMIN — Medication 3 MILLIGRAM(S): at 22:47

## 2022-09-19 RX ADMIN — Medication 1 TABLET(S): at 11:40

## 2022-09-19 RX ADMIN — OXYCODONE HYDROCHLORIDE 10 MILLIGRAM(S): 5 TABLET ORAL at 16:53

## 2022-09-19 RX ADMIN — Medication 30 MILLILITER(S): at 00:34

## 2022-09-19 RX ADMIN — LACTULOSE 20 GRAM(S): 10 SOLUTION ORAL at 17:13

## 2022-09-19 RX ADMIN — Medication 1 MILLIGRAM(S): at 11:40

## 2022-09-19 RX ADMIN — OXYCODONE HYDROCHLORIDE 10 MILLIGRAM(S): 5 TABLET ORAL at 00:10

## 2022-09-19 RX ADMIN — Medication 1 PATCH: at 18:02

## 2022-09-19 RX ADMIN — OXYCODONE HYDROCHLORIDE 10 MILLIGRAM(S): 5 TABLET ORAL at 08:41

## 2022-09-19 NOTE — PROGRESS NOTE ADULT - PROBLEM SELECTOR PROBLEM 1
Bacteremia due to Escherichia coli
Bacteremia due to Escherichia coli
Metabolic encephalopathy
Bacteremia due to Escherichia coli
Metabolic encephalopathy
Bacteremia due to Escherichia coli

## 2022-09-19 NOTE — DISCHARGE NOTE NURSING/CASE MANAGEMENT/SOCIAL WORK - NSDCFUADDAPPT_GEN_ALL_CORE_FT
Please bring your Insurance card, Photo ID and Discharge paperwork to the following appointments:    (1) Please follow up with Dr. Fanny Haynes on behalf of Dr. Re Ling at [address], [place] [address] on 09/22/2022 at 1:30pm.    Appointment was scheduled by Ms. CARLOS Granger, Referral Coordinator.    (2) Please follow up with your Gastroenterology Provider, Dr. Mo Barahona at [address] [place] [address] on 09/23/2022 at 1:00pm.    Appointment was scheduled by Ms. CARLOS Granger, Referral Coordinator.   Please bring your Insurance card, Photo ID and Discharge paperwork to the following appointments:    (1) Please follow up with Dr. Fanny Haynes on behalf of Dr. Re Ling at [address], [place] [address] on 09/22/2022 at 1:30pm.    Appointment was scheduled by Ms. CARLOS Granger, Referral Coordinator.    (2) Please follow up with your Gastroenterology Provider, Dr. Mo Barahona at [address] [place] [address] on 09/23/2022 at 1:00pm.    Appointment was scheduled by Ms. CARLOS Granger, Referral Coordinator.   Please bring your Insurance card, Photo ID and Discharge paperwork to the following appointments:    (1) Please follow up with Dr. Fanny Haynes on behalf of Dr. Re Ling at [address], [place] [address] on 09/22/2022 at 1:30pm.    Appointment was scheduled by Ms. CARLOS Granger, Referral Coordinator.    (2) Please follow up with your Gastroenterology Provider, Dr. Mo Barahona at [address] [place] [address] on 09/23/2022 at 1:00pm.    Appointment was scheduled by Ms. CARLOS Granger, Referral Coordinator.

## 2022-09-19 NOTE — PROGRESS NOTE ADULT - ASSESSMENT
52 y/o man with PMH significant for cirrhosis, DM with previously untreated ESBL UTI, presenting with rigors, AMS, fever and hemodynamic instability with elevated WBC count likely due to multiorgan failure 2/2 SBP i/s/o alcoholic cirrhosis now medically stable without acute processes.  50 y/o man with PMH significant for cirrhosis, DM with previously untreated ESBL UTI, presenting with rigors, AMS, fever and hemodynamic instability with elevated WBC count likely due to multiorgan failure 2/2 SBP i/s/o alcoholic cirrhosis now medically stable without acute processes.

## 2022-09-19 NOTE — PROGRESS NOTE ADULT - PROVIDER SPECIALTY LIST ADULT
Internal Medicine
MICU
Infectious Disease
Internal Medicine
Internal Medicine
Infectious Disease
Rehab Medicine
Internal Medicine
Hospitalist
Internal Medicine

## 2022-09-19 NOTE — DISCHARGE NOTE NURSING/CASE MANAGEMENT/SOCIAL WORK - NSDCPEFALRISK_GEN_ALL_CORE
For information on Fall & Injury Prevention, visit: [email or web address] OR  [email or web address] OR  https://www.cdc.gov/steadi/patient.html For information on Fall & Injury Prevention, visit: [email or web address]/[email or web address]-mobility OR  [email or web address].[email or web address]-[email or web address]-injury OR  https://www.cdc.gov/steadi/patient.html For information on Fall & Injury Prevention, visit: [email or web address]-mobility OR  [email or web address] OR  https://www.cdc.gov/steadi/patient.html

## 2022-09-19 NOTE — PROGRESS NOTE ADULT - PROBLEM SELECTOR PLAN 1
2/2 SBP s/p paracentesis. Repeat cultures from 09/10 are negative.     Plan:   - ceftriaxone 2 g IV q24 (last dose was on 09/16)  - on lifelong ciprofloxacin 500 mg for SBP prophylaxis

## 2022-09-19 NOTE — DISCHARGE NOTE NURSING/CASE MANAGEMENT/SOCIAL WORK - PATIENT PORTAL LINK FT
You can access the FollowMyHealth Patient Portal offered by [facility] by registering at the following website: [email or web address]. By joining [employer]’s FollowMyHealth portal, you will also be able to view your health information using other applications (apps) compatible with our system. You can access the FollowMyHealth Patient Portal offered by [facility] by registering at the following website: [email or web address]. By joining [facility] [employer]’s FollowMyHealth portal, you will also be able to view your health information using other applications (apps) compatible with our system. You can access the FollowMyHealth Patient Portal offered by [facility] by registering at the following website: [email or web address]. By joining [employer]’s FollowMyHealth portal, you will also be able to view your health information using other applications (apps) compatible with our system.

## 2022-09-19 NOTE — PROGRESS NOTE ADULT - SUBJECTIVE AND OBJECTIVE BOX
Subjective/ROS: Patient seen and examined at bedside. No change in status, patient appealled DC notice. Patient medically stable with no acute change in symptomatology. Denies Fever/Chills, HA, CP, SOB, n/v, changes in bowel/urinary habits.  12pt ROS otherwise negative.    VITALS  Vital Signs Last 24 Hrs  T(C): 36.4 (19 Sep 2022 06:09), Max: 36.8 (18 Sep 2022 20:41)  T(F): 97.6 (19 Sep 2022 06:09), Max: 98.3 (18 Sep 2022 20:41)  HR: 79 (19 Sep 2022 06:09) (79 - 97)  BP: 106/67 (19 Sep 2022 06:09) (106/67 - 120/73)  BP(mean): --  RR: 18 (19 Sep 2022 06:09) (18 - 18)  SpO2: 99% (19 Sep 2022 06:09) (99% - 100%)    Parameters below as of 19 Sep 2022 08:23  Patient On (Oxygen Delivery Method): room air        CAPILLARY BLOOD GLUCOSE      POCT Blood Glucose.: 166 mg/dL (19 Sep 2022 12:18)  POCT Blood Glucose.: 162 mg/dL (19 Sep 2022 08:49)  POCT Blood Glucose.: 242 mg/dL (18 Sep 2022 22:10)  POCT Blood Glucose.: 152 mg/dL (18 Sep 2022 17:29)      PHYSICAL EXAM:  Constitutional: NAD  HEENT: NC/AT, PERRLA, EOMI, no conjunctival pallor or scleral icterus, MMM  Neck: Supple, no JVD  Respiratory: CTA B/L. No w/r/r.   Cardiovascular: RRR, normal S1 and S2, no m/r/g.   Gastrointestinal: +BS, soft NTND, no guarding or rebound tenderness, no palpable masses, loose abdominal skin  Extremities: wwp; no cyanosis, clubbing or edema.   Vascular: Pulses equal and strong throughout.   Neurological: AAOx3, no CN deficits, strength and sensation intact throughout.   Skin: No gross skin abnormalities or rashes        MEDICATIONS  (STANDING):  ciprofloxacin     Tablet 500 milliGRAM(s) Oral daily  dextrose 5%. 1000 milliLiter(s) (50 mL/Hr) IV Continuous <Continuous>  dextrose 5%. 1000 milliLiter(s) (100 mL/Hr) IV Continuous <Continuous>  dextrose 50% Injectable 25 Gram(s) IV Push once  dextrose 50% Injectable 12.5 Gram(s) IV Push once  dextrose 50% Injectable 25 Gram(s) IV Push once  enoxaparin Injectable 40 milliGRAM(s) SubCutaneous every 12 hours  folic acid 1 milliGRAM(s) Oral daily  glucagon  Injectable 1 milliGRAM(s) IntraMuscular once  insulin lispro (ADMELOG) corrective regimen sliding scale   SubCutaneous Before meals and at bedtime  lactulose Syrup 20 Gram(s) Oral every 12 hours  multivitamin/minerals 1 Tablet(s) Oral daily  nicotine -  14 mG/24Hr(s) Patch 1 Patch Transdermal daily  oxyCODONE    IR 10 milliGRAM(s) Oral three times a day  pantoprazole    Tablet 40 milliGRAM(s) Oral before breakfast  thiamine 100 milliGRAM(s) Oral daily    MEDICATIONS  (PRN):  acetaminophen     Tablet .. 975 milliGRAM(s) Oral every 8 hours PRN Mild Pain (1 - 3), Moderate Pain (4 - 6), Severe Pain (7 - 10)  dextrose Oral Gel 15 Gram(s) Oral once PRN Blood Glucose LESS THAN 70 milliGRAM(s)/deciliter  hemorrhoidal Ointment 1 Application(s) Rectal three times a day PRN hemorrhoids  melatonin 3 milliGRAM(s) Oral at bedtime PRN Sleep      No Known Allergies      LABS                      IMAGING/EKG/ETC

## 2022-09-19 NOTE — PROGRESS NOTE ADULT - PROBLEM SELECTOR PROBLEM 3
Severe sepsis with septic shock
Severe sepsis with septic shock
UTI due to extended-spectrum beta lactamase (ESBL) producing Escherichia coli
Severe sepsis with septic shock
UTI due to extended-spectrum beta lactamase (ESBL) producing Escherichia coli
Severe sepsis with septic shock

## 2022-09-19 NOTE — PROGRESS NOTE ADULT - ATTENDING COMMENTS
52 y/o man with PMH significant for cirrhosis, DM who presented with septic shock and found to have E coli bacteremia with likely source SBP.  Medically improved, dc notice given and appealed pending result    #Ecoli bacteremia  - previous admit with UA + for ESBL Ecoli, was not treated at that time as patient asymptomatic  - Blood cultures are E coli but NOT ESBL  - s/p paracentesis with likely SBP, did not technically meet criteria but patient was on abx for 5 days prior to tap  - Collateral obtained from [place] regarding prior paracenteses in 7/22 and 4/22 without SBP  - s/p CTX 7 day course, will dc on lifelong SBP ppx with Cipro.  Discussed with patient.  Cipro preferred to Bactrim as patient with frequent AKIs.  QTc 431 on 9/9    #septic shock  - patient presented with sepsis due to E coli infection requiring levophed now improving  - Abx as above  - Weaned off midodrine  - hold all antihypertensives    #Cirrhosis  - due to EtOH abuse, no appreciable ascites on exam although limited due to habitus with excess skin due to patient's weight loss  - patient previously on Lasix and Spironolactone, now held due to hypotension  - c/w outpatient follow up with [facility].  Will likely not be able to restart diuretic therapy during this admission  - c/w lactulose  - SBP tx and ppx as above  - c/w DVT ppx with heparin  - outpatient GI follow up    #LINNETTE  - patient presented with Cr 1.7 from dc Cr 1.2, now downtrend to 0.9. Resolved  - was likely pre renal due to sepsis    #Anemia  - Likely due to chronic inflammation, mild KELLY    #Thrombocytopenia  - stable, no signs of bleeding    #DM - c/w ISS  #HTN - now hypotensive, hold all BP meds  #Tobacco abuse - nicotine replacement    Dispo: given DC notice 9/16, pending appeal results.  Remains medically stable for dc 50 y/o man with PMH significant for cirrhosis, DM who presented with septic shock and found to have E coli bacteremia with likely source SBP.  Medically improved, dc notice given and appealed pending result    #Ecoli bacteremia  - previous admit with UA + for ESBL Ecoli, was not treated at that time as patient asymptomatic  - Blood cultures are E coli but NOT ESBL  - s/p paracentesis with likely SBP, did not technically meet criteria but patient was on abx for 5 days prior to tap  - Collateral obtained from [place] regarding prior paracenteses in 7/22 and 4/22 without SBP  - s/p CTX 7 day course, will dc on lifelong SBP ppx with Cipro.  Discussed with patient.  Cipro preferred to Bactrim as patient with frequent AKIs.  QTc 431 on 9/9    #septic shock  - patient presented with sepsis due to E coli infection requiring levophed now improving  - Abx as above  - Weaned off midodrine  - hold all antihypertensives    #Cirrhosis  - due to EtOH abuse, no appreciable ascites on exam although limited due to habitus with excess skin due to patient's weight loss  - patient previously on Lasix and Spironolactone, now held due to hypotension  - c/w outpatient follow up with [facility].  Will likely not be able to restart diuretic therapy during this admission  - c/w lactulose  - SBP tx and ppx as above  - c/w DVT ppx with heparin  - outpatient GI follow up    #LINNETTE  - patient presented with Cr 1.7 from dc Cr 1.2, now downtrend to 0.9. Resolved  - was likely pre renal due to sepsis    #Anemia  - Likely due to chronic inflammation, mild KELLY    #Thrombocytopenia  - stable, no signs of bleeding    #DM - c/w ISS  #HTN - now hypotensive, hold all BP meds  #Tobacco abuse - nicotine replacement    Dispo: given DC notice 9/16, pending appeal results.  Remains medically stable for dc 50 y/o man with PMH significant for cirrhosis, DM who presented with septic shock and found to have E coli bacteremia with likely source SBP.  Medically improved, dc notice given and appealed pending result    #Ecoli bacteremia  - previous admit with UA + for ESBL Ecoli, was not treated at that time as patient asymptomatic  - Blood cultures are E coli but NOT ESBL  - s/p paracentesis with likely SBP, did not technically meet criteria but patient was on abx for 5 days prior to tap  - Collateral obtained from [place] regarding prior paracenteses in 7/22 and 4/22 without SBP  - s/p CTX 7 day course, will dc on lifelong SBP ppx with Cipro.  Discussed with patient.  Cipro preferred to Bactrim as patient with frequent AKIs.  QTc 431 on 9/9    #septic shock  - patient presented with sepsis due to E coli infection requiring levophed now improving  - Abx as above  - Weaned off midodrine  - hold all antihypertensives    #Cirrhosis  - due to EtOH abuse, no appreciable ascites on exam although limited due to habitus with excess skin due to patient's weight loss  - patient previously on Lasix and Spironolactone, now held due to hypotension  - c/w outpatient follow up with [facility].  Will likely not be able to restart diuretic therapy during this admission  - c/w lactulose  - SBP tx and ppx as above  - c/w DVT ppx with heparin  - outpatient GI follow up    #LINNETTE  - patient presented with Cr 1.7 from dc Cr 1.2, now downtrend to 0.9. Resolved  - was likely pre renal due to sepsis    #Anemia  - Likely due to chronic inflammation, mild KELLY    #Thrombocytopenia  - stable, no signs of bleeding    #DM - c/w ISS  #HTN - now hypotensive, hold all BP meds  #Tobacco abuse - nicotine replacement    Dispo: given DC notice 9/16, pending appeal results.  Remains medically stable for dc

## 2022-09-20 VITALS
OXYGEN SATURATION: 99 % | RESPIRATION RATE: 17 BRPM | TEMPERATURE: 98 F | HEART RATE: 91 BPM | DIASTOLIC BLOOD PRESSURE: 81 MMHG | SYSTOLIC BLOOD PRESSURE: 120 MMHG

## 2022-09-20 LAB
GLUCOSE BLDC GLUCOMTR-MCNC: 133 MG/DL — HIGH (ref 70–99)
GLUCOSE BLDC GLUCOMTR-MCNC: 157 MG/DL — HIGH (ref 70–99)

## 2022-09-20 PROCEDURE — 36415 COLL VENOUS BLD VENIPUNCTURE: CPT

## 2022-09-20 PROCEDURE — 87150 DNA/RNA AMPLIFIED PROBE: CPT

## 2022-09-20 PROCEDURE — 80048 BASIC METABOLIC PNL TOTAL CA: CPT

## 2022-09-20 PROCEDURE — 85025 COMPLETE CBC W/AUTO DIFF WBC: CPT

## 2022-09-20 PROCEDURE — 87040 BLOOD CULTURE FOR BACTERIA: CPT

## 2022-09-20 PROCEDURE — 83735 ASSAY OF MAGNESIUM: CPT

## 2022-09-20 PROCEDURE — 82042 OTHER SOURCE ALBUMIN QUAN EA: CPT

## 2022-09-20 PROCEDURE — 83605 ASSAY OF LACTIC ACID: CPT

## 2022-09-20 PROCEDURE — 82728 ASSAY OF FERRITIN: CPT

## 2022-09-20 PROCEDURE — 96374 THER/PROPH/DIAG INJ IV PUSH: CPT

## 2022-09-20 PROCEDURE — 85730 THROMBOPLASTIN TIME PARTIAL: CPT

## 2022-09-20 PROCEDURE — 82140 ASSAY OF AMMONIA: CPT

## 2022-09-20 PROCEDURE — 80053 COMPREHEN METABOLIC PANEL: CPT

## 2022-09-20 PROCEDURE — 83615 LACTATE (LD) (LDH) ENZYME: CPT

## 2022-09-20 PROCEDURE — 82962 GLUCOSE BLOOD TEST: CPT

## 2022-09-20 PROCEDURE — 99285 EMERGENCY DEPT VISIT HI MDM: CPT | Mod: 25

## 2022-09-20 PROCEDURE — 87070 CULTURE OTHR SPECIMN AEROBIC: CPT

## 2022-09-20 PROCEDURE — 87075 CULTR BACTERIA EXCEPT BLOOD: CPT

## 2022-09-20 PROCEDURE — 84484 ASSAY OF TROPONIN QUANT: CPT

## 2022-09-20 PROCEDURE — C1769: CPT

## 2022-09-20 PROCEDURE — 49083 ABD PARACENTESIS W/IMAGING: CPT

## 2022-09-20 PROCEDURE — 83690 ASSAY OF LIPASE: CPT

## 2022-09-20 PROCEDURE — 84100 ASSAY OF PHOSPHORUS: CPT

## 2022-09-20 PROCEDURE — 81001 URINALYSIS AUTO W/SCOPE: CPT

## 2022-09-20 PROCEDURE — 94660 CPAP INITIATION&MGMT: CPT

## 2022-09-20 PROCEDURE — 85610 PROTHROMBIN TIME: CPT

## 2022-09-20 PROCEDURE — 84157 ASSAY OF PROTEIN OTHER: CPT

## 2022-09-20 PROCEDURE — 83550 IRON BINDING TEST: CPT

## 2022-09-20 PROCEDURE — 87186 SC STD MICRODIL/AGAR DIL: CPT

## 2022-09-20 PROCEDURE — 87086 URINE CULTURE/COLONY COUNT: CPT

## 2022-09-20 PROCEDURE — 97161 PT EVAL LOW COMPLEX 20 MIN: CPT

## 2022-09-20 PROCEDURE — C1729: CPT

## 2022-09-20 PROCEDURE — 87205 SMEAR GRAM STAIN: CPT

## 2022-09-20 PROCEDURE — 80307 DRUG TEST PRSMV CHEM ANLYZR: CPT

## 2022-09-20 PROCEDURE — 83540 ASSAY OF IRON: CPT

## 2022-09-20 PROCEDURE — 89051 BODY FLUID CELL COUNT: CPT

## 2022-09-20 PROCEDURE — 82945 GLUCOSE OTHER FLUID: CPT

## 2022-09-20 PROCEDURE — 74177 CT ABD & PELVIS W/CONTRAST: CPT

## 2022-09-20 PROCEDURE — 85027 COMPLETE CBC AUTOMATED: CPT

## 2022-09-20 PROCEDURE — 71045 X-RAY EXAM CHEST 1 VIEW: CPT

## 2022-09-20 PROCEDURE — 87635 SARS-COV-2 COVID-19 AMP PRB: CPT

## 2022-09-20 PROCEDURE — 96375 TX/PRO/DX INJ NEW DRUG ADDON: CPT

## 2022-09-20 RX ORDER — OXYCODONE HYDROCHLORIDE 5 MG/1
1 TABLET ORAL
Qty: 15 | Refills: 0
Start: 2022-09-20 | End: 2022-09-24

## 2022-09-20 RX ADMIN — OXYCODONE HYDROCHLORIDE 10 MILLIGRAM(S): 5 TABLET ORAL at 13:44

## 2022-09-20 RX ADMIN — Medication 1 TABLET(S): at 11:50

## 2022-09-20 RX ADMIN — Medication 100 MILLIGRAM(S): at 11:51

## 2022-09-20 RX ADMIN — Medication 1 MILLIGRAM(S): at 11:51

## 2022-09-20 RX ADMIN — Medication 2: at 12:20

## 2022-09-20 RX ADMIN — Medication 500 MILLIGRAM(S): at 11:51

## 2022-09-20 RX ADMIN — Medication 1 PATCH: at 11:30

## 2022-09-20 RX ADMIN — LACTULOSE 20 GRAM(S): 10 SOLUTION ORAL at 06:43

## 2022-09-20 RX ADMIN — PANTOPRAZOLE SODIUM 40 MILLIGRAM(S): 20 TABLET, DELAYED RELEASE ORAL at 08:28

## 2022-09-20 RX ADMIN — Medication 1 PATCH: at 11:50

## 2022-09-20 RX ADMIN — OXYCODONE HYDROCHLORIDE 10 MILLIGRAM(S): 5 TABLET ORAL at 06:44

## 2022-09-20 RX ADMIN — ENOXAPARIN SODIUM 40 MILLIGRAM(S): 100 INJECTION SUBCUTANEOUS at 08:28

## 2022-09-21 PROBLEM — Z00.00 ENCOUNTER FOR PREVENTIVE HEALTH EXAMINATION: Status: ACTIVE | Noted: 2022-09-21

## 2022-09-22 ENCOUNTER — APPOINTMENT (OUTPATIENT)
Dept: INTERNAL MEDICINE | Facility: CLINIC | Age: 52
End: 2022-09-22

## 2022-09-23 DIAGNOSIS — E83.42 HYPOMAGNESEMIA: ICD-10-CM

## 2022-09-23 DIAGNOSIS — K27.9 PEPTIC ULCER, SITE UNSPECIFIED, UNSPECIFIED AS ACUTE OR CHRONIC, WITHOUT HEMORRHAGE OR PERFORATION: ICD-10-CM

## 2022-09-23 DIAGNOSIS — R16.1 SPLENOMEGALY, NOT ELSEWHERE CLASSIFIED: ICD-10-CM

## 2022-09-23 DIAGNOSIS — N39.0 URINARY TRACT INFECTION, SITE NOT SPECIFIED: ICD-10-CM

## 2022-09-23 DIAGNOSIS — G93.41 METABOLIC ENCEPHALOPATHY: ICD-10-CM

## 2022-09-23 DIAGNOSIS — G89.29 OTHER CHRONIC PAIN: ICD-10-CM

## 2022-09-23 DIAGNOSIS — G47.33 OBSTRUCTIVE SLEEP APNEA (ADULT) (PEDIATRIC): ICD-10-CM

## 2022-09-23 DIAGNOSIS — R65.21 SEVERE SEPSIS WITH SEPTIC SHOCK: ICD-10-CM

## 2022-09-23 DIAGNOSIS — E43 UNSPECIFIED SEVERE PROTEIN-CALORIE MALNUTRITION: ICD-10-CM

## 2022-09-23 DIAGNOSIS — E11.9 TYPE 2 DIABETES MELLITUS WITHOUT COMPLICATIONS: ICD-10-CM

## 2022-09-23 DIAGNOSIS — Z72.0 TOBACCO USE: ICD-10-CM

## 2022-09-23 DIAGNOSIS — D69.6 THROMBOCYTOPENIA, UNSPECIFIED: ICD-10-CM

## 2022-09-23 DIAGNOSIS — J45.909 UNSPECIFIED ASTHMA, UNCOMPLICATED: ICD-10-CM

## 2022-09-23 DIAGNOSIS — K65.2 SPONTANEOUS BACTERIAL PERITONITIS: ICD-10-CM

## 2022-09-23 DIAGNOSIS — N62 HYPERTROPHY OF BREAST: ICD-10-CM

## 2022-09-23 DIAGNOSIS — Z98.84 BARIATRIC SURGERY STATUS: ICD-10-CM

## 2022-09-23 DIAGNOSIS — I95.9 HYPOTENSION, UNSPECIFIED: ICD-10-CM

## 2022-09-23 DIAGNOSIS — R33.9 RETENTION OF URINE, UNSPECIFIED: ICD-10-CM

## 2022-09-23 DIAGNOSIS — K70.31 ALCOHOLIC CIRRHOSIS OF LIVER WITH ASCITES: ICD-10-CM

## 2022-09-23 DIAGNOSIS — A41.9 SEPSIS, UNSPECIFIED ORGANISM: ICD-10-CM

## 2022-09-23 DIAGNOSIS — B96.20 UNSPECIFIED ESCHERICHIA COLI [E. COLI] AS THE CAUSE OF DISEASES CLASSIFIED ELSEWHERE: ICD-10-CM

## 2022-09-23 DIAGNOSIS — M25.569 PAIN IN UNSPECIFIED KNEE: ICD-10-CM

## 2022-09-23 DIAGNOSIS — N17.0 ACUTE KIDNEY FAILURE WITH TUBULAR NECROSIS: ICD-10-CM

## 2022-09-23 DIAGNOSIS — D63.8 ANEMIA IN OTHER CHRONIC DISEASES CLASSIFIED ELSEWHERE: ICD-10-CM

## 2022-09-23 DIAGNOSIS — Z16.12 EXTENDED SPECTRUM BETA LACTAMASE (ESBL) RESISTANCE: ICD-10-CM

## 2022-11-08 ENCOUNTER — APPOINTMENT (OUTPATIENT)
Dept: INTERNAL MEDICINE | Facility: CLINIC | Age: 52
End: 2022-11-08

## 2023-11-05 NOTE — PROGRESS NOTE ADULT - PROBLEM SELECTOR PROBLEM 12
Problem related to housing and economic circumstances
No
Problem related to housing and economic circumstances

## 2023-12-09 RX ORDER — SPIRONOLACTONE 25 MG/1
1 TABLET, FILM COATED ORAL
Qty: 0 | Refills: 0 | DISCHARGE

## 2023-12-09 RX ORDER — SUCRALFATE 1 G
1 TABLET ORAL
Qty: 0 | Refills: 0 | DISCHARGE

## 2023-12-09 RX ORDER — ACAMPROSATE CALCIUM 333 MG/1
1 TABLET, DELAYED RELEASE ORAL
Qty: 0 | Refills: 0 | DISCHARGE

## 2023-12-09 RX ORDER — OXYCODONE HYDROCHLORIDE 5 MG/1
1 TABLET ORAL
Qty: 0 | Refills: 0 | DISCHARGE

## 2023-12-09 RX ORDER — GABAPENTIN 400 MG/1
1 CAPSULE ORAL
Qty: 0 | Refills: 0 | DISCHARGE

## 2023-12-09 RX ORDER — FUROSEMIDE 40 MG
1 TABLET ORAL
Qty: 0 | Refills: 0 | DISCHARGE

## 2023-12-09 RX ORDER — LISINOPRIL/HYDROCHLOROTHIAZIDE 10-12.5 MG
1 TABLET ORAL
Qty: 0 | Refills: 0 | DISCHARGE

## 2023-12-09 RX ORDER — ACAMPROSATE CALCIUM 333 MG/1
2 TABLET, DELAYED RELEASE ORAL
Qty: 0 | Refills: 0 | DISCHARGE